# Patient Record
Sex: FEMALE | Race: BLACK OR AFRICAN AMERICAN | Employment: OTHER | URBAN - METROPOLITAN AREA
[De-identification: names, ages, dates, MRNs, and addresses within clinical notes are randomized per-mention and may not be internally consistent; named-entity substitution may affect disease eponyms.]

---

## 2017-05-12 ENCOUNTER — HOSPITAL ENCOUNTER (OUTPATIENT)
Age: 47
Setting detail: SPECIMEN
Discharge: HOME | End: 2017-05-12
Payer: MEDICARE

## 2017-05-12 LAB
ANION GAP SERPL CALCULATED.3IONS-SCNC: 14 MMOL/L (ref 9–17)
BUN BLDV-MCNC: 12 MG/DL (ref 6–20)
BUN/CREAT BLD: ABNORMAL (ref 9–20)
CALCIUM SERPL-MCNC: 8.7 MG/DL (ref 8.6–10.4)
CHLORIDE BLD-SCNC: 102 MMOL/L (ref 98–107)
CO2: 26 MMOL/L (ref 20–31)
CREAT SERPL-MCNC: 0.76 MG/DL (ref 0.5–0.9)
GFR AFRICAN AMERICAN: >60 ML/MIN
GFR NON-AFRICAN AMERICAN: >60 ML/MIN
GFR SERPL CREATININE-BSD FRML MDRD: ABNORMAL ML/MIN/{1.73_M2}
GFR SERPL CREATININE-BSD FRML MDRD: ABNORMAL ML/MIN/{1.73_M2}
GLUCOSE BLD-MCNC: 90 MG/DL (ref 70–99)
POTASSIUM SERPL-SCNC: 3.3 MMOL/L (ref 3.7–5.3)
SODIUM BLD-SCNC: 142 MMOL/L (ref 135–144)

## 2017-07-07 ENCOUNTER — OFFICE VISIT (OUTPATIENT)
Dept: PULMONOLOGY | Age: 47
End: 2017-07-07
Payer: MEDICARE

## 2017-07-07 VITALS — HEART RATE: 73 BPM | BODY MASS INDEX: 48.82 KG/M2 | WEIGHT: 293 LBS | HEIGHT: 65 IN | OXYGEN SATURATION: 99 %

## 2017-07-07 DIAGNOSIS — R06.00 DYSPNEA, UNSPECIFIED TYPE: Primary | ICD-10-CM

## 2017-07-07 PROCEDURE — 4004F PT TOBACCO SCREEN RCVD TLK: CPT | Performed by: INTERNAL MEDICINE

## 2017-07-07 PROCEDURE — 94375 RESPIRATORY FLOW VOLUME LOOP: CPT | Performed by: INTERNAL MEDICINE

## 2017-07-11 ENCOUNTER — OFFICE VISIT (OUTPATIENT)
Dept: PULMONOLOGY | Age: 47
End: 2017-07-11
Payer: MEDICARE

## 2017-07-11 VITALS
SYSTOLIC BLOOD PRESSURE: 153 MMHG | WEIGHT: 293 LBS | HEART RATE: 88 BPM | DIASTOLIC BLOOD PRESSURE: 88 MMHG | OXYGEN SATURATION: 96 % | HEIGHT: 65 IN | BODY MASS INDEX: 48.82 KG/M2

## 2017-07-11 DIAGNOSIS — J45.40 MODERATE PERSISTENT ASTHMA WITHOUT COMPLICATION: ICD-10-CM

## 2017-07-11 DIAGNOSIS — G47.33 OSA (OBSTRUCTIVE SLEEP APNEA): Primary | ICD-10-CM

## 2017-07-11 DIAGNOSIS — E66.01 MORBID OBESITY WITH BMI OF 50.0-59.9, ADULT (HCC): ICD-10-CM

## 2017-07-11 PROCEDURE — 99204 OFFICE O/P NEW MOD 45 MIN: CPT | Performed by: INTERNAL MEDICINE

## 2017-07-11 PROCEDURE — G8417 CALC BMI ABV UP PARAM F/U: HCPCS | Performed by: INTERNAL MEDICINE

## 2017-07-11 PROCEDURE — 1036F TOBACCO NON-USER: CPT | Performed by: INTERNAL MEDICINE

## 2017-07-11 PROCEDURE — G8427 DOCREV CUR MEDS BY ELIG CLIN: HCPCS | Performed by: INTERNAL MEDICINE

## 2017-07-11 RX ORDER — FLUTICASONE PROPIONATE 50 MCG
2 SPRAY, SUSPENSION (ML) NASAL DAILY
Qty: 1 BOTTLE | Refills: 11 | Status: SHIPPED | OUTPATIENT
Start: 2017-07-11 | End: 2017-11-03 | Stop reason: ALTCHOICE

## 2017-07-11 RX ORDER — TRAZODONE HYDROCHLORIDE 50 MG/1
50 TABLET ORAL NIGHTLY
COMMUNITY

## 2017-07-11 RX ORDER — OXYBUTYNIN CHLORIDE 10 MG/1
10 TABLET, EXTENDED RELEASE ORAL DAILY
COMMUNITY

## 2017-07-11 RX ORDER — PRAVASTATIN SODIUM 80 MG/1
80 TABLET ORAL NIGHTLY
COMMUNITY

## 2017-07-11 RX ORDER — ARIPIPRAZOLE 15 MG/1
15 TABLET ORAL NIGHTLY
COMMUNITY

## 2017-07-11 ASSESSMENT — SLEEP AND FATIGUE QUESTIONNAIRES
HOW LIKELY ARE YOU TO NOD OFF OR FALL ASLEEP IN A CAR, WHILE STOPPED FOR A FEW MINUTES IN TRAFFIC: 0
HOW LIKELY ARE YOU TO NOD OFF OR FALL ASLEEP WHILE WATCHING TV: 2
HOW LIKELY ARE YOU TO NOD OFF OR FALL ASLEEP WHILE SITTING QUIETLY AFTER LUNCH WITHOUT ALCOHOL: 1
HOW LIKELY ARE YOU TO NOD OFF OR FALL ASLEEP WHEN YOU ARE A PASSENGER IN A CAR FOR AN HOUR WITHOUT A BREAK: 0
ESS TOTAL SCORE: 7
HOW LIKELY ARE YOU TO NOD OFF OR FALL ASLEEP WHILE SITTING AND READING: 2
HOW LIKELY ARE YOU TO NOD OFF OR FALL ASLEEP WHILE SITTING INACTIVE IN A PUBLIC PLACE: 1
HOW LIKELY ARE YOU TO NOD OFF OR FALL ASLEEP WHILE SITTING AND TALKING TO SOMEONE: 0
HOW LIKELY ARE YOU TO NOD OFF OR FALL ASLEEP WHILE LYING DOWN TO REST IN THE AFTERNOON WHEN CIRCUMSTANCES PERMIT: 1

## 2017-07-28 ENCOUNTER — HOSPITAL ENCOUNTER (OUTPATIENT)
Dept: SLEEP CENTER | Age: 47
Discharge: HOME | End: 2017-07-28
Payer: MEDICARE

## 2017-07-28 DIAGNOSIS — G47.33 OSA (OBSTRUCTIVE SLEEP APNEA): ICD-10-CM

## 2017-07-28 PROCEDURE — 95810 POLYSOM 6/> YRS 4/> PARAM: CPT

## 2017-07-28 ASSESSMENT — SLEEP AND FATIGUE QUESTIONNAIRES: NECK CIRCUMFERENCE (INCHES): 44

## 2017-07-29 VITALS — RESPIRATION RATE: 22 BRPM | HEIGHT: 65 IN | HEART RATE: 87 BPM | WEIGHT: 293 LBS | BODY MASS INDEX: 48.82 KG/M2

## 2017-08-07 ENCOUNTER — HOSPITAL ENCOUNTER (OUTPATIENT)
Dept: SLEEP CENTER | Age: 47
Discharge: HOME | End: 2017-08-07
Payer: MEDICARE

## 2017-08-07 DIAGNOSIS — G47.33 OSA (OBSTRUCTIVE SLEEP APNEA): ICD-10-CM

## 2017-08-07 PROCEDURE — 95811 POLYSOM 6/>YRS CPAP 4/> PARM: CPT

## 2017-08-25 ENCOUNTER — HOSPITAL ENCOUNTER (EMERGENCY)
Age: 47
Discharge: HOME | End: 2017-08-25
Attending: EMERGENCY MEDICINE
Payer: MEDICARE

## 2017-08-25 VITALS
HEART RATE: 95 BPM | OXYGEN SATURATION: 97 % | DIASTOLIC BLOOD PRESSURE: 80 MMHG | SYSTOLIC BLOOD PRESSURE: 135 MMHG | RESPIRATION RATE: 16 BRPM | WEIGHT: 293 LBS | BODY MASS INDEX: 48.82 KG/M2 | TEMPERATURE: 97.5 F | HEIGHT: 65 IN

## 2017-08-25 DIAGNOSIS — N93.8 DYSFUNCTIONAL UTERINE BLEEDING: Primary | ICD-10-CM

## 2017-08-25 LAB
-: ABNORMAL
AMORPHOUS: ABNORMAL
BACTERIA: ABNORMAL
BILIRUBIN URINE: NEGATIVE
CASTS UA: ABNORMAL /LPF (ref 0–2)
COLOR: ABNORMAL
CRYSTALS, UA: ABNORMAL /HPF
DIRECT EXAM: NORMAL
EPITHELIAL CELLS UA: ABNORMAL /HPF (ref 0–5)
GLUCOSE URINE: NEGATIVE
HCG(URINE) PREGNANCY TEST: NEGATIVE
HCT VFR BLD CALC: 36.7 % (ref 36–46)
HEMOGLOBIN: 11.8 G/DL (ref 12–16)
KETONES, URINE: NEGATIVE
LEUKOCYTE ESTERASE, URINE: ABNORMAL
Lab: NORMAL
MUCUS: ABNORMAL
NITRITE, URINE: NEGATIVE
OTHER OBSERVATIONS UA: ABNORMAL
PH UA: 5.5 (ref 5–8)
PROTEIN UA: ABNORMAL
RBC UA: ABNORMAL /HPF (ref 0–2)
RENAL EPITHELIAL, UA: ABNORMAL /HPF
SPECIFIC GRAVITY UA: 1.02 (ref 1–1.03)
SPECIMEN DESCRIPTION: NORMAL
STATUS: NORMAL
TRICHOMONAS: ABNORMAL
TURBIDITY: ABNORMAL
URINE HGB: ABNORMAL
UROBILINOGEN, URINE: NORMAL
WBC UA: ABNORMAL /HPF (ref 0–5)
YEAST: ABNORMAL

## 2017-08-25 PROCEDURE — 99284 EMERGENCY DEPT VISIT MOD MDM: CPT

## 2017-08-25 PROCEDURE — 81001 URINALYSIS AUTO W/SCOPE: CPT

## 2017-08-25 PROCEDURE — 87491 CHLMYD TRACH DNA AMP PROBE: CPT

## 2017-08-25 PROCEDURE — 85018 HEMOGLOBIN: CPT

## 2017-08-25 PROCEDURE — 85014 HEMATOCRIT: CPT

## 2017-08-25 PROCEDURE — 87510 GARDNER VAG DNA DIR PROBE: CPT

## 2017-08-25 PROCEDURE — 87660 TRICHOMONAS VAGIN DIR PROBE: CPT

## 2017-08-25 PROCEDURE — 84703 CHORIONIC GONADOTROPIN ASSAY: CPT

## 2017-08-25 PROCEDURE — 87480 CANDIDA DNA DIR PROBE: CPT

## 2017-08-25 PROCEDURE — 87591 N.GONORRHOEAE DNA AMP PROB: CPT

## 2017-08-25 ASSESSMENT — ENCOUNTER SYMPTOMS
RESPIRATORY NEGATIVE: 1
ALLERGIC/IMMUNOLOGIC NEGATIVE: 1
GASTROINTESTINAL NEGATIVE: 1
EYES NEGATIVE: 1

## 2017-08-28 LAB
C TRACH DNA GENITAL QL NAA+PROBE: NEGATIVE
N. GONORRHOEAE DNA: NEGATIVE

## 2017-09-06 ENCOUNTER — OFFICE VISIT (OUTPATIENT)
Dept: OBGYN | Age: 47
End: 2017-09-06
Payer: MEDICARE

## 2017-09-06 VITALS
BODY MASS INDEX: 57.58 KG/M2 | WEIGHT: 293 LBS | HEART RATE: 98 BPM | SYSTOLIC BLOOD PRESSURE: 132 MMHG | DIASTOLIC BLOOD PRESSURE: 89 MMHG

## 2017-09-06 DIAGNOSIS — N92.0 MENORRHAGIA WITH REGULAR CYCLE: ICD-10-CM

## 2017-09-06 DIAGNOSIS — N94.6 DYSMENORRHEA: ICD-10-CM

## 2017-09-06 DIAGNOSIS — Z01.419 WELL WOMAN EXAM: Primary | ICD-10-CM

## 2017-09-06 PROCEDURE — 99212 OFFICE O/P EST SF 10 MIN: CPT

## 2017-09-06 PROCEDURE — G0101 CA SCREEN;PELVIC/BREAST EXAM: HCPCS | Performed by: OBSTETRICS & GYNECOLOGY

## 2017-09-07 DIAGNOSIS — Z12.39 BREAST CANCER SCREENING: Primary | ICD-10-CM

## 2017-09-13 ENCOUNTER — OFFICE VISIT (OUTPATIENT)
Dept: PULMONOLOGY | Age: 47
End: 2017-09-13
Payer: MEDICARE

## 2017-09-13 VITALS
OXYGEN SATURATION: 99 % | TEMPERATURE: 97.1 F | WEIGHT: 293 LBS | DIASTOLIC BLOOD PRESSURE: 77 MMHG | HEART RATE: 71 BPM | HEIGHT: 65 IN | BODY MASS INDEX: 48.82 KG/M2 | SYSTOLIC BLOOD PRESSURE: 145 MMHG | RESPIRATION RATE: 14 BRPM

## 2017-09-13 VITALS — WEIGHT: 293 LBS | OXYGEN SATURATION: 99 % | BODY MASS INDEX: 48.82 KG/M2 | HEART RATE: 53 BPM | HEIGHT: 65 IN

## 2017-09-13 DIAGNOSIS — J30.89 NON-SEASONAL ALLERGIC RHINITIS, UNSPECIFIED ALLERGIC RHINITIS TRIGGER: ICD-10-CM

## 2017-09-13 DIAGNOSIS — J45.30 MILD PERSISTENT ASTHMA WITHOUT COMPLICATION: Primary | ICD-10-CM

## 2017-09-13 DIAGNOSIS — G47.33 OSA ON CPAP: ICD-10-CM

## 2017-09-13 DIAGNOSIS — J45.40 MODERATE PERSISTENT ASTHMA WITHOUT COMPLICATION: ICD-10-CM

## 2017-09-13 DIAGNOSIS — Z99.89 OSA ON CPAP: ICD-10-CM

## 2017-09-13 DIAGNOSIS — R06.00 DYSPNEA, UNSPECIFIED TYPE: ICD-10-CM

## 2017-09-13 DIAGNOSIS — G47.33 OSA (OBSTRUCTIVE SLEEP APNEA): Primary | ICD-10-CM

## 2017-09-13 PROCEDURE — G8417 CALC BMI ABV UP PARAM F/U: HCPCS | Performed by: INTERNAL MEDICINE

## 2017-09-13 PROCEDURE — 94375 RESPIRATORY FLOW VOLUME LOOP: CPT | Performed by: INTERNAL MEDICINE

## 2017-09-13 PROCEDURE — 99214 OFFICE O/P EST MOD 30 MIN: CPT | Performed by: INTERNAL MEDICINE

## 2017-09-13 PROCEDURE — 1036F TOBACCO NON-USER: CPT | Performed by: INTERNAL MEDICINE

## 2017-09-13 PROCEDURE — 94729 DIFFUSING CAPACITY: CPT | Performed by: INTERNAL MEDICINE

## 2017-09-13 PROCEDURE — G8427 DOCREV CUR MEDS BY ELIG CLIN: HCPCS | Performed by: INTERNAL MEDICINE

## 2017-09-13 PROCEDURE — 94726 PLETHYSMOGRAPHY LUNG VOLUMES: CPT | Performed by: INTERNAL MEDICINE

## 2017-09-13 ASSESSMENT — SLEEP AND FATIGUE QUESTIONNAIRES
HOW LIKELY ARE YOU TO NOD OFF OR FALL ASLEEP WHILE SITTING QUIETLY AFTER LUNCH WITHOUT ALCOHOL: 1
HOW LIKELY ARE YOU TO NOD OFF OR FALL ASLEEP WHILE SITTING AND TALKING TO SOMEONE: 0
HOW LIKELY ARE YOU TO NOD OFF OR FALL ASLEEP IN A CAR, WHILE STOPPED FOR A FEW MINUTES IN TRAFFIC: 0
HOW LIKELY ARE YOU TO NOD OFF OR FALL ASLEEP WHILE SITTING AND READING: 2
HOW LIKELY ARE YOU TO NOD OFF OR FALL ASLEEP WHILE WATCHING TV: 3
HOW LIKELY ARE YOU TO NOD OFF OR FALL ASLEEP WHEN YOU ARE A PASSENGER IN A CAR FOR AN HOUR WITHOUT A BREAK: 1
HOW LIKELY ARE YOU TO NOD OFF OR FALL ASLEEP WHILE LYING DOWN TO REST IN THE AFTERNOON WHEN CIRCUMSTANCES PERMIT: 3
HOW LIKELY ARE YOU TO NOD OFF OR FALL ASLEEP WHILE SITTING INACTIVE IN A PUBLIC PLACE: 1
ESS TOTAL SCORE: 11

## 2017-09-27 ENCOUNTER — OFFICE VISIT (OUTPATIENT)
Dept: OBGYN | Age: 47
End: 2017-09-27
Payer: MEDICARE

## 2017-09-27 VITALS
SYSTOLIC BLOOD PRESSURE: 149 MMHG | BODY MASS INDEX: 59.08 KG/M2 | HEART RATE: 79 BPM | WEIGHT: 293 LBS | DIASTOLIC BLOOD PRESSURE: 76 MMHG

## 2017-09-27 DIAGNOSIS — Z09 FOLLOW UP: ICD-10-CM

## 2017-09-27 DIAGNOSIS — N92.0 MENORRHAGIA WITH REGULAR CYCLE: Primary | ICD-10-CM

## 2017-09-27 PROCEDURE — G8417 CALC BMI ABV UP PARAM F/U: HCPCS | Performed by: OBSTETRICS & GYNECOLOGY

## 2017-09-27 PROCEDURE — G8427 DOCREV CUR MEDS BY ELIG CLIN: HCPCS | Performed by: OBSTETRICS & GYNECOLOGY

## 2017-09-27 PROCEDURE — 99212 OFFICE O/P EST SF 10 MIN: CPT

## 2017-09-27 PROCEDURE — 1036F TOBACCO NON-USER: CPT | Performed by: OBSTETRICS & GYNECOLOGY

## 2017-09-27 PROCEDURE — 99213 OFFICE O/P EST LOW 20 MIN: CPT | Performed by: OBSTETRICS & GYNECOLOGY

## 2017-09-28 ENCOUNTER — TELEPHONE (OUTPATIENT)
Dept: OBGYN | Age: 47
End: 2017-09-28

## 2017-10-02 ENCOUNTER — HOSPITAL ENCOUNTER (OUTPATIENT)
Dept: MAMMOGRAPHY | Age: 47
Discharge: HOME | End: 2017-10-02
Payer: MEDICARE

## 2017-10-02 DIAGNOSIS — Z12.39 BREAST CANCER SCREENING: ICD-10-CM

## 2017-10-02 PROCEDURE — 77063 BREAST TOMOSYNTHESIS BI: CPT

## 2017-10-20 ENCOUNTER — PREP FOR PROCEDURE (OUTPATIENT)
Dept: OBGYN | Age: 47
End: 2017-10-20

## 2017-10-20 ENCOUNTER — OFFICE VISIT (OUTPATIENT)
Dept: OBGYN | Age: 47
End: 2017-10-20
Payer: MEDICARE

## 2017-10-20 VITALS
SYSTOLIC BLOOD PRESSURE: 124 MMHG | WEIGHT: 293 LBS | HEIGHT: 65 IN | DIASTOLIC BLOOD PRESSURE: 78 MMHG | BODY MASS INDEX: 48.82 KG/M2 | HEART RATE: 89 BPM | RESPIRATION RATE: 18 BRPM | TEMPERATURE: 97.9 F

## 2017-10-20 DIAGNOSIS — N94.6 DYSMENORRHEA: ICD-10-CM

## 2017-10-20 DIAGNOSIS — Z01.818 PREOP TESTING: Primary | ICD-10-CM

## 2017-10-20 DIAGNOSIS — N92.0 MENORRHAGIA WITH REGULAR CYCLE: Primary | ICD-10-CM

## 2017-10-20 PROCEDURE — G8427 DOCREV CUR MEDS BY ELIG CLIN: HCPCS | Performed by: OBSTETRICS & GYNECOLOGY

## 2017-10-20 PROCEDURE — G8417 CALC BMI ABV UP PARAM F/U: HCPCS | Performed by: OBSTETRICS & GYNECOLOGY

## 2017-10-20 PROCEDURE — 99212 OFFICE O/P EST SF 10 MIN: CPT

## 2017-10-20 PROCEDURE — 1036F TOBACCO NON-USER: CPT | Performed by: OBSTETRICS & GYNECOLOGY

## 2017-10-20 PROCEDURE — G8484 FLU IMMUNIZE NO ADMIN: HCPCS | Performed by: OBSTETRICS & GYNECOLOGY

## 2017-10-20 PROCEDURE — 99213 OFFICE O/P EST LOW 20 MIN: CPT | Performed by: OBSTETRICS & GYNECOLOGY

## 2017-10-20 RX ORDER — SODIUM CHLORIDE 0.9 % (FLUSH) 0.9 %
10 SYRINGE (ML) INJECTION PRN
Status: CANCELLED | OUTPATIENT
Start: 2017-10-20

## 2017-10-20 RX ORDER — SODIUM CHLORIDE 0.9 % (FLUSH) 0.9 %
10 SYRINGE (ML) INJECTION EVERY 12 HOURS SCHEDULED
Status: CANCELLED | OUTPATIENT
Start: 2017-10-20

## 2017-10-20 NOTE — PROGRESS NOTES
Jaida Mckenzie  1970  Primary Care Physician: Devi Louis    HISTORY & PHYSICAL      10/20/2017       HOSPITAL: [facility]     HPI: Jaida Mckenzie is a 49 y.o. female  who complains of new onset dysmenorrhea and menorrhagia. Patient was unable to tolerate a TVUS and therefore had a transabdominal exam was previously performed. Ultrasound results revealed a 3.7 cm hypoechoic lesion in the posterior uterus suggestive of a fibroid and a mildly thickened endometrial stripe. Patient counseled on recommendation for EMB for further evaluation of her menorrhagia. Patient is at increased risk for endometrial cancer given her nulliparity. Patient experiences significant discomfort with vaginal exams and states that she does not believe she will be able to tolerate a EMB. Patient counseled that the only other way to evaluate her AUB for hyperplasia would be with a hysteroscopy D&C. Patient elects to proceed with surgical evaluation. Given patients comorbid conditions, she was instructed that she will need medical clearance for surgery. 1. Menorrhagia with regular cycle    2.  Dysmenorrhea       Patient Active Problem List   Diagnosis    Hypertension    Hyperlipidemia    Chronic back pain    Obesity    PCOS (polycystic ovarian syndrome)    Schizophrenia ([address]    Depression    Genital warts    Dysmenorrhea    Menorrhagia       Obstetric History       T0      L0     SAB0   TAB0   Ectopic0   Molar0   Multiple0   Live Births0         Past Medical History:   Diagnosis Date    Bronchitis     Chronic back pain     Depression 2014    Diabetes mellitus ([address]     Type II    Frequency of urination     Hyperlipidemia     Hypertension     Obesity     Palpitations     occasional    Schizophrenia ([address]     Wears glasses        Past Surgical History:   Procedure Laterality Date    OTHER SURGICAL HISTORY Right 10/17/14    excision dorsal exostosis rt foot Social History     Social History    Marital status: Single     Spouse name: N/A    Number of children: N/A    Years of education: N/A     Occupational History    Not on file. Social History Main Topics    Smoking status: Never Smoker    Smokeless tobacco: Never Used    Alcohol use No    Drug use: No    Sexual activity: No     Other Topics Concern    Not on file     Social History Narrative    No narrative on file         MEDICATIONS:  Current Outpatient Prescriptions   Medication Sig Dispense Refill    ARIPiprazole (ABILIFY) 15 MG tablet Take 15 mg by mouth daily      traZODone (DESYREL) 50 MG tablet Take 50 mg by mouth nightly      beclomethasone (QVAR) 40 MCG/ACT inhaler Inhale 2 puffs into the lungs 2 times daily      pravastatin (PRAVACHOL) 80 MG tablet Take 80 mg by mouth daily      oxybutynin (DITROPAN-XL) 10 MG extended release tablet Take 10 mg by mouth daily      fluticasone (FLONASE) 50 MCG/ACT nasal spray 2 sprays by Nasal route daily 1 Bottle 11    gabapentin (NEURONTIN) 100 MG capsule Take 100 mg by mouth 3 times daily.  potassium chloride (MICRO-K) 10 MEQ CR capsule Take 10 mEq by mouth daily.  valsartan (DIOVAN) 40 MG tablet Take 40 mg by mouth daily. Patient takes at night      metFORMIN (GLUCOPHAGE) 500 MG tablet Take 500 mg by mouth daily (with breakfast).  hydrochlorothiazide (MICROZIDE) 12.5 MG capsule Take 12.5 mg by mouth daily.  amLODIPine (NORVASC) 10 MG tablet Take 10 mg by mouth daily. Patient takes at night      ezetimibe (ZETIA) 10 MG tablet Take 10 mg by mouth daily.  albuterol (PROVENTIL HFA;VENTOLIN HFA) 108 (90 BASE) MCG/ACT inhaler Inhale 2 puffs into the lungs every 6 hours as needed for Wheezing.  simvastatin (ZOCOR) 20 MG tablet Take 80 mg by mouth nightly. No current facility-administered medications for this visit.             ALLERGIES:  Allergies as of 10/20/2017 - Review Complete 10/20/2017   Allergen Reaction Date: 10/3/2017  EXAMINATION: BILATERAL DIGITAL SCREENING MAMMOGRAM 10/2/2017 TECHNIQUE: CC and MLO views of the left and right breasts were obtained. Computer aided detection was utilized in the interpretation of this exam. COMPARISON: 06/30/2014 HISTORY: Screening FINDINGS: The breasts are almost entirely fatty. There is no new dominant mass, suspicious microcalcification, or area of architectural distortion. No mammographic evidence of malignancy. BIRADS: BIRADS - CATEGORY 1 Negative, no evidence of malignancy. Normal interval follow-up is recommended in 12 months. OVERALL ASSESSMENT - NEGATIVE A letter of notification will be sent to the patient regarding the results. The [employer] recommends annual mammograms for women 40 years and older. Labs:  Results for orders placed or performed during the hospital encounter of 08/25/17   C.trachomatis N.gonorrhoeae DNA   Result Value Ref Range    C. trachomatis DNA NEGATIVE NEG    N. gonorrhoeae DNA NEGATIVE NEG   VAGINITIS DNA PROBE   Result Value Ref Range    Specimen Description . VAGINA     Special Requests NOT REPORTED     Direct Exam NEGATIVE for Candida sp. Direct Exam NEGATIVE for Trichomonas vaginalis     Direct Exam NEGATIVE for Gardnerella vaginalis     Direct Exam       Method of testing is a DNA probe intended for detection and identification of    Direct Exam        Candida species, Gardnerella vaginalis, and Trichomonas vaginalis nucleic acid    Direct Exam        in vaginal fluid specimens from patients with symptoms of vaginitis/vaginosis.     Direct Exam       [facility] [id number] [place] [address] [phone]    Status FINAL 08/25/2017    Urinalysis with microscopic   Result Value Ref Range    Color, UA RED (A) YEL    Turbidity UA TURBID (A) CLEAR    Glucose, Ur NEGATIVE NEG    Bilirubin Urine NEGATIVE NEG    Ketones, Urine NEGATIVE NEG    Specific Gravity, UA 1.024 1.005 - 1.030    Urine Hgb LARGE (A) NEG    pH, UA 5.5 5.0 - 8.0    Protein, UA 1+ (A) NEG    Urobilinogen, Urine Normal NORM    Nitrite, Urine NEGATIVE NEG    Leukocyte Esterase, Urine SMALL (A) NEG    -          WBC, UA 10 TO 20 0 - 5 /HPF    RBC, UA TOO NUMEROUS TO COUNT 0 - 2 /HPF    Casts UA NOT REPORTED 0 - 2 /LPF    Crystals UA NOT REPORTED NONE /HPF    Epithelial Cells UA 5 TO 10 0 - 5 /HPF    Renal Epithelial, Urine NOT REPORTED 0 /HPF    Bacteria, UA NOT REPORTED NONE    Mucus, UA NOT REPORTED NONE    Trichomonas, UA NOT REPORTED NONE    Amorphous, UA NOT REPORTED NONE    Other Observations UA NOT REPORTED NREQ    Yeast, UA NOT REPORTED NONE   Pregnancy, Urine   Result Value Ref Range    HCG(Urine) Pregnancy Test NEGATIVE NEG   HEMOGLOBIN AND HEMATOCRIT, BLOOD   Result Value Ref Range    Hemoglobin 11.8 (L) 12.0 - 16.0 g/dL    Hematocrit 36.7 36 - 46 %           ASSESSMENT:    1. Menorrhagia with regular cycle     2. Dysmenorrhea         Patient Active Problem List    Diagnosis Date Noted    Dysmenorrhea 09/06/2017    Menorrhagia 09/06/2017     TVUS ordered on 9/6/17  Patient counseled on recommendation for EMB, patient to consider   Follow up in 3 weeks       Genital warts 06/27/2014     Return for excision      PCOS (polycystic ovarian syndrome) 05/01/2014    Schizophrenia ([address] 05/01/2014     On Abilify  Follows at [address] [place] [address] 05/01/2014     On trazadone  Seen at [facility]  No SI/HI      Hypertension     Hyperlipidemia     Chronic back pain     Obesity           Plan:  Patient scheduled for PAT on 10/30/17  Patient states she completed chest xray for pre-operative clearance   Hysteroscopy D&C scheduled for 11/14/17  Cytotec 800 mcg prescribed to be taken the night prior to surgery       Counseling: The patient was counseled on all options both medical and surgical, conservative as well as definitive. She has elected to proceed with the procedure as stated above. The patient was counseled on the procedure. Risks and complications were reviewed in detail. The patients orders, labs, consents have been completed. The history and physical as well as all supporting surgical documentation will be forwarded to the pre-operative holding area. The patient is aware that this procedure may not alleviate her symptoms. That there may be a necessity for a second surgery and that there may be an incomplete removal of abnormal tissue. The patients that are undergoing a procedure for family planning WERE INSTRUCTED THAT THE PROCEDURE IS PERMANENT AND NON REVERSIBLE. FAILURE RATES OF 18-20/1000 CASES WERE REVIEWED AS WELL AS ALL ALTERNATE REVERSIBLE FORMS OF BIRTH CONTROL MALE AND FEMALE. THEY WERE COUNSELED THAT A FAILURE WOULD MOST LIKELY RESULT IN AN ECTOPIC PREGNANCY, A PREGNANCY OUTSIDE OF THE UTERUS MOST COMMONLY IN THE FALLOPIAN TUBE, NECESSITATING FURTHER SURGERY, A BLOOD TRANSFUSION OR BOTH. POST TUBAL STERILIZATION SYNDROME WAS ALSO DISCUSSED WITH THE PATIENT AT LENGTH.      ________________________________________D.O./M.D. Date:___________  A. Mira Minors DO        Patient was seen with total face to face time of 15 minutes. More than 50% of this visit was on counseling and education regarding her   1. Menorrhagia with regular cycle     2. Dysmenorrhea      and her options. She was also counseled on her preventative health maintenance recommendations and follow-up.

## 2017-10-20 NOTE — PROGRESS NOTES
Attending Physician Statement  I have discussed the care of Chloe Burk, including pertinent history and exam findings,  with the resident. I have reviewed the key elements of all parts of the encounter with the resident. I agree with the assessment, plan and orders as documented by the resident.   (GE Modifier)

## 2017-10-30 ENCOUNTER — HOSPITAL ENCOUNTER (OUTPATIENT)
Dept: PREADMISSION TESTING | Age: 47
Discharge: HOME | End: 2017-10-30
Payer: MEDICARE

## 2017-10-30 VITALS
RESPIRATION RATE: 16 BRPM | OXYGEN SATURATION: 96 % | BODY MASS INDEX: 48.82 KG/M2 | TEMPERATURE: 97.9 F | DIASTOLIC BLOOD PRESSURE: 68 MMHG | HEIGHT: 65 IN | HEART RATE: 90 BPM | WEIGHT: 293 LBS | SYSTOLIC BLOOD PRESSURE: 128 MMHG

## 2017-10-30 LAB
ABO/RH: NORMAL
ABSOLUTE EOS #: 0.32 K/UL (ref 0.01–0.44)
ABSOLUTE IMMATURE GRANULOCYTE: <0.03 K/UL (ref 0–0.3)
ABSOLUTE LYMPH #: 2.26 K/UL (ref 1.1–3.7)
ABSOLUTE MONO #: 0.44 K/UL (ref 0.1–1.2)
ALP BLD-CCNC: 79 U/L (ref 35–104)
ALT SERPL-CCNC: 21 U/L (ref 5–33)
ANION GAP SERPL CALCULATED.3IONS-SCNC: 17 MMOL/L (ref 9–17)
ANTIBODY SCREEN: NEGATIVE
ARM BAND NUMBER: NORMAL
BASOPHILS # BLD: 1 % (ref 0–2)
BASOPHILS ABSOLUTE: 0.08 K/UL (ref 0.03–0.2)
BILIRUBIN URINE: NEGATIVE
BUN BLDV-MCNC: 13 MG/DL (ref 6–20)
CHLORIDE BLD-SCNC: 105 MMOL/L (ref 98–107)
CO2: 24 MMOL/L (ref 20–31)
COLOR: YELLOW
COMMENT UA: NORMAL
CREAT SERPL-MCNC: 0.78 MG/DL (ref 0.5–0.9)
DIFFERENTIAL TYPE: ABNORMAL
EKG ATRIAL RATE: 73 BPM
EKG P AXIS: 36 DEGREES
EKG P-R INTERVAL: 178 MS
EKG Q-T INTERVAL: 402 MS
EKG QRS DURATION: 94 MS
EKG QTC CALCULATION (BAZETT): 442 MS
EKG R AXIS: 18 DEGREES
EKG T AXIS: 32 DEGREES
EKG VENTRICULAR RATE: 73 BPM
EOSINOPHILS RELATIVE PERCENT: 5 % (ref 1–4)
EXPIRATION DATE: NORMAL
GFR AFRICAN AMERICAN: >60 ML/MIN
GFR NON-AFRICAN AMERICAN: >60 ML/MIN
GFR SERPL CREATININE-BSD FRML MDRD: NORMAL ML/MIN/{1.73_M2}
GFR SERPL CREATININE-BSD FRML MDRD: NORMAL ML/MIN/{1.73_M2}
GLUCOSE BLD-MCNC: 82 MG/DL (ref 70–99)
GLUCOSE URINE: NEGATIVE
HCG QUANTITATIVE: <1 IU/L
HCT VFR BLD CALC: 39.1 % (ref 36.3–47.1)
HEMOGLOBIN: 11.7 G/DL (ref 11.9–15.1)
IMMATURE GRANULOCYTES: 0 %
KETONES, URINE: NEGATIVE
LEUKOCYTE ESTERASE, URINE: NEGATIVE
LYMPHOCYTES # BLD: 35 % (ref 24–43)
MCH RBC QN AUTO: 26.7 PG (ref 25.2–33.5)
MCHC RBC AUTO-ENTMCNC: 29.9 G/DL (ref 29.9–34.7)
MCV RBC AUTO: 89.1 FL (ref 82.6–102.9)
MONOCYTES # BLD: 7 % (ref 3–12)
NITRITE, URINE: NEGATIVE
PDW BLD-RTO: 14.7 % (ref 11.8–14.4)
PH UA: 7.5 (ref 5–8)
PLATELET # BLD: 235 K/UL (ref 138–453)
PLATELET ESTIMATE: ABNORMAL
PMV BLD AUTO: 11 FL (ref 8.1–13.5)
POTASSIUM SERPL-SCNC: 3.5 MMOL/L (ref 3.7–5.3)
PROTEIN UA: NEGATIVE
RBC # BLD: 4.39 M/UL (ref 3.95–5.11)
RBC # BLD: ABNORMAL 10*6/UL
SEG NEUTROPHILS: 52 % (ref 36–65)
SEGMENTED NEUTROPHILS ABSOLUTE COUNT: 3.31 K/UL (ref 1.5–8.1)
SODIUM BLD-SCNC: 146 MMOL/L (ref 135–144)
SPECIFIC GRAVITY UA: 1.02 (ref 1–1.03)
TURBIDITY: CLEAR
URINE HGB: NEGATIVE
UROBILINOGEN, URINE: NORMAL
WBC # BLD: 6.4 K/UL (ref 3.5–11.3)
WBC # BLD: ABNORMAL 10*3/UL

## 2017-10-30 PROCEDURE — 84702 CHORIONIC GONADOTROPIN TEST: CPT

## 2017-10-30 PROCEDURE — 84460 ALANINE AMINO (ALT) (SGPT): CPT

## 2017-10-30 PROCEDURE — 81003 URINALYSIS AUTO W/O SCOPE: CPT

## 2017-10-30 PROCEDURE — 85025 COMPLETE CBC W/AUTO DIFF WBC: CPT

## 2017-10-30 PROCEDURE — 86900 BLOOD TYPING SEROLOGIC ABO: CPT

## 2017-10-30 PROCEDURE — 36415 COLL VENOUS BLD VENIPUNCTURE: CPT

## 2017-10-30 PROCEDURE — 84075 ASSAY ALKALINE PHOSPHATASE: CPT

## 2017-10-30 PROCEDURE — 86901 BLOOD TYPING SEROLOGIC RH(D): CPT

## 2017-10-30 PROCEDURE — 86850 RBC ANTIBODY SCREEN: CPT

## 2017-10-30 PROCEDURE — 82565 ASSAY OF CREATININE: CPT

## 2017-10-30 PROCEDURE — 82947 ASSAY GLUCOSE BLOOD QUANT: CPT

## 2017-10-30 PROCEDURE — 93005 ELECTROCARDIOGRAM TRACING: CPT

## 2017-10-30 PROCEDURE — 84520 ASSAY OF UREA NITROGEN: CPT

## 2017-10-30 PROCEDURE — 87086 URINE CULTURE/COLONY COUNT: CPT

## 2017-10-30 PROCEDURE — 80051 ELECTROLYTE PANEL: CPT

## 2017-10-30 RX ORDER — SODIUM CHLORIDE, SODIUM LACTATE, POTASSIUM CHLORIDE, CALCIUM CHLORIDE 600; 310; 30; 20 MG/100ML; MG/100ML; MG/100ML; MG/100ML
1000 INJECTION, SOLUTION INTRAVENOUS CONTINUOUS
Status: CANCELLED | OUTPATIENT
Start: 2017-10-30

## 2017-10-31 DIAGNOSIS — E87.6 HYPOKALEMIA: Primary | ICD-10-CM

## 2017-10-31 DIAGNOSIS — Z01.818 PREOPERATIVE TESTING: ICD-10-CM

## 2017-10-31 LAB
CULTURE: NORMAL
CULTURE: NORMAL
Lab: NORMAL
SPECIMEN DESCRIPTION: NORMAL
STATUS: NORMAL

## 2017-11-03 ENCOUNTER — OFFICE VISIT (OUTPATIENT)
Dept: PULMONOLOGY | Age: 47
End: 2017-11-03
Payer: MEDICARE

## 2017-11-03 VITALS
HEART RATE: 79 BPM | WEIGHT: 293 LBS | DIASTOLIC BLOOD PRESSURE: 78 MMHG | RESPIRATION RATE: 18 BRPM | OXYGEN SATURATION: 97 % | SYSTOLIC BLOOD PRESSURE: 141 MMHG | HEIGHT: 65 IN | BODY MASS INDEX: 48.82 KG/M2

## 2017-11-03 DIAGNOSIS — Z99.89 OSA ON CPAP: Primary | ICD-10-CM

## 2017-11-03 DIAGNOSIS — G47.33 OSA ON CPAP: Primary | ICD-10-CM

## 2017-11-03 DIAGNOSIS — J45.909 PERSISTENT ASTHMA WITHOUT COMPLICATION, UNSPECIFIED ASTHMA SEVERITY: ICD-10-CM

## 2017-11-03 PROCEDURE — 99213 OFFICE O/P EST LOW 20 MIN: CPT | Performed by: NURSE PRACTITIONER

## 2017-11-03 PROCEDURE — G8484 FLU IMMUNIZE NO ADMIN: HCPCS | Performed by: NURSE PRACTITIONER

## 2017-11-03 PROCEDURE — 1036F TOBACCO NON-USER: CPT | Performed by: NURSE PRACTITIONER

## 2017-11-03 PROCEDURE — G8427 DOCREV CUR MEDS BY ELIG CLIN: HCPCS | Performed by: NURSE PRACTITIONER

## 2017-11-03 PROCEDURE — G8417 CALC BMI ABV UP PARAM F/U: HCPCS | Performed by: NURSE PRACTITIONER
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
## 2017-11-13 NOTE — H&P
OB/GYN Pre-Op H&P  [address] [facility]    Patient Name: Nikki Noe     Patient : 1970  Room/Bed: [facility] OR [facility]/NONE  Admission Date/Time: 2017  5:24 AM  Primary Care Physician: Mayo Eastman  MRN: [id number]    Date: 2017  Time: 8:14 AM    The patient was seen in pre-op holding. She is here for Hysteroscopy, Dilation and Curettage with Myosure. Patient initially presented with new onset abnormal uterine bleeding. Patient was unable to tolerate a TVUS and therefore had a transabdominal exam was previously performed. Ultrasound results revealed a 3.7 cm hypoechoic lesion in the posterior uterus suggestive of a fibroid and a mildly thickened endometrial stripe. Patient counseled on recommendation for EMB for further evaluation of her menorrhagia. Patient is at increased risk for endometrial cancer given her nulliparity. Patient experiences significant discomfort with vaginal exams and states that she does not believe she will be able to tolerate a EMB. Patient counseled that the only other way to evaluate her AUB for hyperplasia would be with a hysteroscopy D&C. Patient elects to proceed with surgical evaluation. The procedure risks and complications were reviewed. The labs, Consent, and H&P were reviewed and updated. The patient was counseled on the possibility of  the need of a second surgery. The patient voiced understanding and had all of her questions answered. The possibility of incomplete removal of abnormal tissue was discussed. OBSTETRICAL HISTORY:   Obstetric History       T0      L0     SAB0   TAB0   Ectopic0   Molar0   Multiple0   Live Births0           PAST MEDICAL HISTORY:   has a past medical history of Asthma; Bronchitis; Chronic back pain; Depression; Diabetes mellitus ([address] Frequency of urination; Hyperlipidemia; Hypertension; Obesity; Palpitations; Schizophrenia ([address] Sleep apnea; and Wears glasses.     PAST SURGICAL HISTORY:   has a past 10/30/2017 146* 135 - 144 mmol/L Final    Potassium 10/30/2017 3.5* 3.7 - 5.3 mmol/L Final    Chloride 10/30/2017 105  98 - 107 mmol/L Final    CO2 10/30/2017 24  20 - 31 mmol/L Final    Anion Gap 10/30/2017 17  9 - 17 mmol/L Final    Glucose 10/30/2017 82  70 - 99 mg/dL Final    BUN 10/30/2017 13  6 - 20 mg/dL Final    CREATININE 10/30/2017 0.78  0.50 - 0.90 mg/dL Final    GFR Non- 10/30/2017 >60  >60 mL/min Final    GFR  10/30/2017 >60  >60 mL/min Final    GFR Comment 10/30/2017        Final    Comment: Average GFR for 38-51 years old:   80 mL/min/1.73sq m  Chronic Kidney Disease:   <60 mL/min/1.73sq m  Kidney failure:   <15 mL/min/1.73sq m              eGFR calculated using average adult body mass. Additional eGFR calculator   available at:        [email or web address]        [address] [phone]      GFR Staging 10/30/2017 NOT REPORTED   Final    hCG Quant 10/30/2017 <1  <5 IU/L Final    Comment:    Non-preg premeno   <=5  Postmeno           <=8  Male               <=3  If HCG results do not concur with clinical observations, additional testing to   confirm result is recommended. This test is not labeled for use as a tumor   marker.   [address] [address] [phone]      Alkaline Phosphatase 10/30/2017 79  35 - 104 U/L Final    ALT 10/30/2017 21  5 - 33 U/L Final    Color, UA 10/30/2017 YELLOW  YEL Final    Turbidity UA 10/30/2017 CLEAR  CLEAR Final    Glucose, Ur 10/30/2017 NEGATIVE  NEG Final    Bilirubin Urine 10/30/2017 NEGATIVE  NEG Final    Ketones, Urine 10/30/2017 NEGATIVE  NEG Final    Specific Gravity, UA 10/30/2017 1.016  1.005 - 1.030 Final    Urine Hgb 10/30/2017 NEGATIVE  NEG Final    pH, UA 10/30/2017 7.5  5.0 - 8.0 Final    Protein, UA 10/30/2017 NEGATIVE  NEG Final    Urobilinogen, Urine 10/30/2017 Normal  NORM Final    Nitrite, Urine

## 2017-11-14 ENCOUNTER — ANESTHESIA (OUTPATIENT)
Dept: OPERATING ROOM | Age: 47
End: 2017-11-14
Payer: MEDICARE

## 2017-11-14 ENCOUNTER — HOSPITAL ENCOUNTER (OUTPATIENT)
Age: 47
Setting detail: OUTPATIENT SURGERY
Discharge: HOME | End: 2017-11-14
Attending: OBSTETRICS & GYNECOLOGY | Admitting: OBSTETRICS & GYNECOLOGY
Payer: MEDICARE

## 2017-11-14 ENCOUNTER — ANESTHESIA EVENT (OUTPATIENT)
Dept: OPERATING ROOM | Age: 47
End: 2017-11-14
Payer: MEDICARE

## 2017-11-14 VITALS
HEIGHT: 65 IN | BODY MASS INDEX: 48.82 KG/M2 | OXYGEN SATURATION: 97 % | RESPIRATION RATE: 16 BRPM | WEIGHT: 293 LBS | TEMPERATURE: 97.5 F | DIASTOLIC BLOOD PRESSURE: 98 MMHG | SYSTOLIC BLOOD PRESSURE: 137 MMHG | HEART RATE: 75 BPM

## 2017-11-14 VITALS
SYSTOLIC BLOOD PRESSURE: 93 MMHG | RESPIRATION RATE: 9 BRPM | TEMPERATURE: 96.4 F | DIASTOLIC BLOOD PRESSURE: 70 MMHG | OXYGEN SATURATION: 97 %

## 2017-11-14 LAB
GLUCOSE BLD-MCNC: 128 MG/DL (ref 74–100)
HCG, PREGNANCY URINE (POC): NEGATIVE
POC POTASSIUM: 4.4 MMOL/L (ref 3.5–4.5)

## 2017-11-14 PROCEDURE — 3700000000 HC ANESTHESIA ATTENDED CARE: Performed by: OBSTETRICS & GYNECOLOGY

## 2017-11-14 PROCEDURE — 88305 TISSUE EXAM BY PATHOLOGIST: CPT

## 2017-11-14 PROCEDURE — 3600000014 HC SURGERY LEVEL 4 ADDTL 15MIN: Performed by: OBSTETRICS & GYNECOLOGY

## 2017-11-14 PROCEDURE — 58558 HYSTEROSCOPY BIOPSY: CPT | Performed by: OBSTETRICS & GYNECOLOGY

## 2017-11-14 PROCEDURE — 7100000001 HC PACU RECOVERY - ADDTL 15 MIN: Performed by: OBSTETRICS & GYNECOLOGY

## 2017-11-14 PROCEDURE — 6360000002 HC RX W HCPCS: Performed by: ANESTHESIOLOGY

## 2017-11-14 PROCEDURE — 84703 CHORIONIC GONADOTROPIN ASSAY: CPT

## 2017-11-14 PROCEDURE — 2580000003 HC RX 258: Performed by: ANESTHESIOLOGY

## 2017-11-14 PROCEDURE — 7100000000 HC PACU RECOVERY - FIRST 15 MIN: Performed by: OBSTETRICS & GYNECOLOGY

## 2017-11-14 PROCEDURE — 3700000001 HC ADD 15 MINUTES (ANESTHESIA): Performed by: OBSTETRICS & GYNECOLOGY

## 2017-11-14 PROCEDURE — 2720000010 HC SURG SUPPLY STERILE: Performed by: OBSTETRICS & GYNECOLOGY

## 2017-11-14 PROCEDURE — 7100000041 HC SPAR PHASE II RECOVERY - ADDTL 15 MIN: Performed by: OBSTETRICS & GYNECOLOGY

## 2017-11-14 PROCEDURE — 3600000004 HC SURGERY LEVEL 4 BASE: Performed by: OBSTETRICS & GYNECOLOGY

## 2017-11-14 PROCEDURE — 6370000000 HC RX 637 (ALT 250 FOR IP): Performed by: NURSE ANESTHETIST, CERTIFIED REGISTERED

## 2017-11-14 PROCEDURE — 84132 ASSAY OF SERUM POTASSIUM: CPT

## 2017-11-14 PROCEDURE — 7100000040 HC SPAR PHASE II RECOVERY - FIRST 15 MIN: Performed by: OBSTETRICS & GYNECOLOGY

## 2017-11-14 PROCEDURE — 82947 ASSAY GLUCOSE BLOOD QUANT: CPT

## 2017-11-14 PROCEDURE — 2500000003 HC RX 250 WO HCPCS: Performed by: NURSE ANESTHETIST, CERTIFIED REGISTERED

## 2017-11-14 PROCEDURE — 6360000002 HC RX W HCPCS: Performed by: NURSE ANESTHETIST, CERTIFIED REGISTERED

## 2017-11-14 RX ORDER — HYDROCODONE BITARTRATE AND ACETAMINOPHEN 5; 325 MG/1; MG/1
1 TABLET ORAL ONCE
Status: DISCONTINUED | OUTPATIENT
Start: 2017-11-14 | End: 2017-11-14 | Stop reason: HOSPADM

## 2017-11-14 RX ORDER — GLYCOPYRROLATE 0.2 MG/ML
INJECTION INTRAMUSCULAR; INTRAVENOUS PRN
Status: DISCONTINUED | OUTPATIENT
Start: 2017-11-14 | End: 2017-11-14 | Stop reason: SDUPTHER

## 2017-11-14 RX ORDER — ONDANSETRON 2 MG/ML
4 INJECTION INTRAMUSCULAR; INTRAVENOUS
Status: DISCONTINUED | OUTPATIENT
Start: 2017-11-14 | End: 2017-11-14 | Stop reason: HOSPADM

## 2017-11-14 RX ORDER — ONDANSETRON 2 MG/ML
INJECTION INTRAMUSCULAR; INTRAVENOUS PRN
Status: DISCONTINUED | OUTPATIENT
Start: 2017-11-14 | End: 2017-11-14 | Stop reason: SDUPTHER

## 2017-11-14 RX ORDER — ROCURONIUM BROMIDE 10 MG/ML
INJECTION, SOLUTION INTRAVENOUS PRN
Status: DISCONTINUED | OUTPATIENT
Start: 2017-11-14 | End: 2017-11-14 | Stop reason: SDUPTHER

## 2017-11-14 RX ORDER — FENTANYL CITRATE 50 UG/ML
25 INJECTION, SOLUTION INTRAMUSCULAR; INTRAVENOUS EVERY 5 MIN PRN
Status: DISCONTINUED | OUTPATIENT
Start: 2017-11-14 | End: 2017-11-14 | Stop reason: HOSPADM

## 2017-11-14 RX ORDER — SODIUM CHLORIDE, SODIUM LACTATE, POTASSIUM CHLORIDE, CALCIUM CHLORIDE 600; 310; 30; 20 MG/100ML; MG/100ML; MG/100ML; MG/100ML
1000 INJECTION, SOLUTION INTRAVENOUS CONTINUOUS
Status: DISCONTINUED | OUTPATIENT
Start: 2017-11-14 | End: 2017-11-14 | Stop reason: HOSPADM

## 2017-11-14 RX ORDER — SODIUM CHLORIDE 0.9 % (FLUSH) 0.9 %
10 SYRINGE (ML) INJECTION EVERY 12 HOURS SCHEDULED
Status: DISCONTINUED | OUTPATIENT
Start: 2017-11-14 | End: 2017-11-14 | Stop reason: HOSPADM

## 2017-11-14 RX ORDER — KETOROLAC TROMETHAMINE 30 MG/ML
30 INJECTION, SOLUTION INTRAMUSCULAR; INTRAVENOUS ONCE
Status: COMPLETED | OUTPATIENT
Start: 2017-11-14 | End: 2017-11-14

## 2017-11-14 RX ORDER — LIDOCAINE HYDROCHLORIDE 10 MG/ML
INJECTION, SOLUTION EPIDURAL; INFILTRATION; INTRACAUDAL; PERINEURAL PRN
Status: DISCONTINUED | OUTPATIENT
Start: 2017-11-14 | End: 2017-11-14 | Stop reason: SDUPTHER

## 2017-11-14 RX ORDER — ALBUTEROL SULFATE 90 UG/1
AEROSOL, METERED RESPIRATORY (INHALATION) PRN
Status: DISCONTINUED | OUTPATIENT
Start: 2017-11-14 | End: 2017-11-14 | Stop reason: SDUPTHER

## 2017-11-14 RX ORDER — SODIUM CHLORIDE 0.9 % (FLUSH) 0.9 %
10 SYRINGE (ML) INJECTION PRN
Status: DISCONTINUED | OUTPATIENT
Start: 2017-11-14 | End: 2017-11-14 | Stop reason: HOSPADM

## 2017-11-14 RX ORDER — ONDANSETRON 4 MG/1
4 TABLET, ORALLY DISINTEGRATING ORAL EVERY 8 HOURS PRN
Qty: 6 TABLET | Refills: 0 | Status: ON HOLD | OUTPATIENT
Start: 2017-11-14 | End: 2019-03-25

## 2017-11-14 RX ORDER — FENTANYL CITRATE 50 UG/ML
50 INJECTION, SOLUTION INTRAMUSCULAR; INTRAVENOUS EVERY 5 MIN PRN
Status: DISCONTINUED | OUTPATIENT
Start: 2017-11-14 | End: 2017-11-14 | Stop reason: HOSPADM

## 2017-11-14 RX ORDER — DIPHENHYDRAMINE HYDROCHLORIDE 50 MG/ML
12.5 INJECTION INTRAMUSCULAR; INTRAVENOUS
Status: DISCONTINUED | OUTPATIENT
Start: 2017-11-14 | End: 2017-11-14 | Stop reason: HOSPADM

## 2017-11-14 RX ORDER — HYDROCODONE BITARTRATE AND ACETAMINOPHEN 5; 325 MG/1; MG/1
2 TABLET ORAL ONCE
Status: DISCONTINUED | OUTPATIENT
Start: 2017-11-14 | End: 2017-11-14 | Stop reason: HOSPADM

## 2017-11-14 RX ORDER — PROPOFOL 10 MG/ML
INJECTION, EMULSION INTRAVENOUS PRN
Status: DISCONTINUED | OUTPATIENT
Start: 2017-11-14 | End: 2017-11-14 | Stop reason: SDUPTHER

## 2017-11-14 RX ORDER — IBUPROFEN 800 MG/1
800 TABLET ORAL EVERY 6 HOURS PRN
Qty: 30 TABLET | Refills: 0 | Status: SHIPPED | OUTPATIENT
Start: 2017-11-14

## 2017-11-14 RX ORDER — SUCCINYLCHOLINE CHLORIDE 20 MG/ML
INJECTION INTRAMUSCULAR; INTRAVENOUS PRN
Status: DISCONTINUED | OUTPATIENT
Start: 2017-11-14 | End: 2017-11-14 | Stop reason: SDUPTHER

## 2017-11-14 RX ORDER — FENTANYL CITRATE 50 UG/ML
INJECTION, SOLUTION INTRAMUSCULAR; INTRAVENOUS PRN
Status: DISCONTINUED | OUTPATIENT
Start: 2017-11-14 | End: 2017-11-14 | Stop reason: SDUPTHER

## 2017-11-14 RX ADMIN — SUCCINYLCHOLINE CHLORIDE 100 MG: 20 INJECTION, SOLUTION INTRAMUSCULAR; INTRAVENOUS at 08:46

## 2017-11-14 RX ADMIN — ROCURONIUM BROMIDE 3 MG: 10 INJECTION INTRAVENOUS at 08:46

## 2017-11-14 RX ADMIN — GLYCOPYRROLATE 0.4 MG: 0.2 INJECTION, SOLUTION INTRAMUSCULAR; INTRAVENOUS at 09:40

## 2017-11-14 RX ADMIN — FENTANYL CITRATE 50 MCG: 50 INJECTION INTRAMUSCULAR; INTRAVENOUS at 08:42

## 2017-11-14 RX ADMIN — ROCURONIUM BROMIDE 10 MG: 10 INJECTION INTRAVENOUS at 08:49

## 2017-11-14 RX ADMIN — SODIUM CHLORIDE, POTASSIUM CHLORIDE, SODIUM LACTATE AND CALCIUM CHLORIDE 1000 ML: 600; 310; 30; 20 INJECTION, SOLUTION INTRAVENOUS at 07:36

## 2017-11-14 RX ADMIN — PROPOFOL 200 MG: 10 INJECTION, EMULSION INTRAVENOUS at 08:46

## 2017-11-14 RX ADMIN — GLYCOPYRROLATE 0.2 MG: 0.2 INJECTION, SOLUTION INTRAMUSCULAR; INTRAVENOUS at 09:05

## 2017-11-14 RX ADMIN — LIDOCAINE HYDROCHLORIDE 50 MG: 10 INJECTION, SOLUTION EPIDURAL; INFILTRATION; INTRACAUDAL; PERINEURAL at 08:46

## 2017-11-14 RX ADMIN — PROPOFOL 120 MG: 10 INJECTION, EMULSION INTRAVENOUS at 08:48

## 2017-11-14 RX ADMIN — FENTANYL CITRATE 50 MCG: 50 INJECTION INTRAMUSCULAR; INTRAVENOUS at 09:12

## 2017-11-14 RX ADMIN — NEOSTIGMINE METHYLSULFATE 2 MG: 1 INJECTION, SOLUTION INTRAMUSCULAR; INTRAVENOUS; SUBCUTANEOUS at 09:40

## 2017-11-14 RX ADMIN — ALBUTEROL SULFATE 5 PUFF: 90 AEROSOL, METERED RESPIRATORY (INHALATION) at 09:57

## 2017-11-14 RX ADMIN — ONDANSETRON 4 MG: 2 INJECTION, SOLUTION INTRAMUSCULAR; INTRAVENOUS at 09:38

## 2017-11-14 RX ADMIN — KETOROLAC TROMETHAMINE 30 MG: 30 INJECTION, SOLUTION INTRAMUSCULAR at 11:16

## 2017-11-14 RX ADMIN — SODIUM CHLORIDE, POTASSIUM CHLORIDE, SODIUM LACTATE AND CALCIUM CHLORIDE: 600; 310; 30; 20 INJECTION, SOLUTION INTRAVENOUS at 09:35

## 2017-11-14 ASSESSMENT — PAIN - FUNCTIONAL ASSESSMENT: PAIN_FUNCTIONAL_ASSESSMENT: 0-10

## 2017-11-14 ASSESSMENT — PAIN SCALES - GENERAL: PAINLEVEL_OUTOF10: 2

## 2017-11-14 ASSESSMENT — ENCOUNTER SYMPTOMS
SHORTNESS OF BREATH: 0
STRIDOR: 0

## 2017-11-14 NOTE — BRIEF OP NOTE
Brief Operative Note  Department of Obstetrics and Gynecology  [facility]     Patient: Dell Staton   : 1970  MRN: [id number]       Acct: [de-identified]   Date of Procedure: 17     Pre-operative Diagnosis: 49 y.o. female    Abnormal Uterine Bleeding      Post-operative Diagnosis:    Abnormal Uterine Bleeding    Endometrial Polyps     Procedure: Operative Hysteroscopy with Myosure     Surgeon: Dr. Trina Farrell     Assistant(s): Dr. Brook Brand, PGY-3    Anesthesia: General     Findings:  8 week sized uterus, normal appearing vaginal mucosa and cervix, multiple polypoid lesions, bilateral tubal ostia visualized  Total IV fluids/Blood products:  1100 ml crystalloid  Urine Output:  100 ml    Estimated blood loss:  10 ml  Drains:  none  Specimens:  1. Endometrial polyps (Myosure Curreting)   Instrument and Sponge Count: Correct  Complications:  none  Condition:  stable, transferred to post anesthesia recovery    See full operative report for further details.     Leonidas Sánchez DO  Ob/Gyn Resident  Pager: [phone]  2017, 10:10 AM

## 2017-11-14 NOTE — OP NOTE
was drained with a straight catheter, and a right angle was placed into the vagina to visualize the cervix. The cervix was grasped with a single-tooth tenaculum. The uterus and the cervix were sounded and measured 8 cm. The cervix was dilated with suki dilators to a 15 [place] size. The rigid Myosure hysteroscope was inserted into the uterine cavity showing both the endocervical and endometrial cavity. Multiple polyp lesions were identied in the endometrial cavity. Bilateral tubal ostia were visualized. The Myosure device was then inserted into the hysteroscope and used to remove the visualized polyps. Myosure curetings were collected and sent for pathology examination. All instruments were then removed. Minimal bleeding was noted at the tenaculum site and a silver nitrate stick was used to obtain hemostasis. Instrument, sponge, and needle counts were correct at the conclusion of the case. SCDs for DVT prophylaxis remain in place for the post operative period. Dr. Jc Moss was present for the entire operation. Findings:  8 week sized uterus, normal appearing vaginal mucosa and cervix, multiple polypoid lesions, bilateral tubal ostia visualized   Total IV fluids/Blood products:  1100 ml crystalloid  Urine Output:  100 ml    Estimated blood loss:  10 ml  Drains:  none  Specimens:  1.  Endometrial polyps (Myosure Curreting)   Instrument and Sponge Count: Correct  Complications:  none  Condition:  stable, transferred to post anesthesia recovery    Vertell Goodpasture, DO  Ob/Gyn Resident  11/14/2017, 10:23 AM

## 2017-11-14 NOTE — ANESTHESIA PRE PROCEDURE
Department of Anesthesiology  Preprocedure Note       Name:  Sandro Harvey   Age:  49 y.o.  :  1970                                          MRN:  [id number]         Date:  2017      Surgeon: Annalisa Martinez):  Yokasta Delgado MD    Procedure: Procedure(s):  MYOSURE - DILATATION AND CURETTAGE, HYSTEROSCOPY    Medications prior to admission:   Prior to Admission medications    Medication Sig Start Date End Date Taking? Authorizing Provider   ARIPiprazole (ABILIFY) 15 MG tablet Take 15 mg by mouth nightly    Yes Historical Provider, MD   traZODone (DESYREL) 50 MG tablet Take 50 mg by mouth nightly   Yes Historical Provider, MD   gabapentin (NEURONTIN) 100 MG capsule Take 100 mg by mouth 3 times daily. Yes Historical Provider, MD   metFORMIN (GLUCOPHAGE) 500 MG tablet Take 500 mg by mouth daily (with breakfast). Yes Historical Provider, MD   DICLOFENAC POTASSIUM PO Take 50 mg by mouth 3 times daily as needed    Historical Provider, MD   beclomethasone (QVAR) 40 MCG/ACT inhaler Inhale 2 puffs into the lungs 2 times daily    Historical Provider, MD   pravastatin (PRAVACHOL) 80 MG tablet Take 80 mg by mouth nightly     Historical Provider, MD   oxybutynin (DITROPAN-XL) 10 MG extended release tablet Take 10 mg by mouth daily    Historical Provider, MD   potassium chloride (MICRO-K) 10 MEQ CR capsule Take 10 mEq by mouth daily. Historical Provider, MD   valsartan (DIOVAN) 40 MG tablet Take 40 mg by mouth nightly Patient takes at night    Historical Provider, MD   hydrochlorothiazide (MICROZIDE) 12.5 MG capsule Take 12.5 mg by mouth nightly     Historical Provider, MD   amLODIPine (NORVASC) 10 MG tablet Take 10 mg by mouth nightly Patient takes at night    Historical Provider, MD   albuterol (PROVENTIL HFA;VENTOLIN HFA) 108 (90 BASE) MCG/ACT inhaler Inhale 2 puffs into the lungs every 6 hours as needed for Wheezing. Historical Provider, MD   simvastatin (ZOCOR) 20 MG tablet Take 80 mg by mouth nightly. Historical Provider, MD       Current medications:    Current Facility-Administered Medications   Medication Dose Route Frequency Provider Last Rate Last Dose    sodium chloride flush 0.9 % injection 10 mL  10 mL Intravenous 2 times per day Jackson Piles, DO        sodium chloride flush 0.9 % injection 10 mL  10 mL Intravenous PRN Jackson Piles, DO        lactated ringers infusion 1,000 mL  1,000 mL Intravenous Continuous Saniya Gandhi MD 50 mL/hr at 11/14/17 0736 1,000 mL at 11/14/17 0736       Allergies:     Allergies   Allergen Reactions    Lisinopril      Makes her cough       Problem List:    Patient Active Problem List   Diagnosis Code    Hypertension I10    Hyperlipidemia E78.5    Chronic back pain M54.9, G89.29    Obesity E66.9    PCOS (polycystic ovarian syndrome) E28.2    Schizophrenia (HCC) F20.9    Depression F32.9    Genital warts A63.0    Dysmenorrhea N94.6    Menorrhagia N92.0       Past Medical History:        Diagnosis Date    Asthma     Bronchitis 2009    Chronic back pain     Depression 5/1/2014    Diabetes mellitus ([facility] [address]     Type II    Frequency of urination     Hyperlipidemia     Hypertension     Obesity     Palpitations     occasional    Schizophrenia ([address]     Sleep apnea since Oct. 2017    CPAP nightly    Wears glasses        Past Surgical History:        Procedure Laterality Date    OTHER SURGICAL HISTORY Right 10/17/14    excision dorsal exostosis rt foot       Social History:    Social History   Substance Use Topics    Smoking status: Passive Smoke Exposure - Never Smoker    Smokeless tobacco: Never Used      Comment: associates smoking    Alcohol use No                                Counseling given: Not Answered      Vital Signs (Current):   Vitals:    11/14/17 0704 11/14/17 0737   BP:  (!) 173/88   Pulse:  73   Resp:  16   Temp:  98.6 °F (37 °C)   TempSrc:  Temporal   SpO2:  98%   Weight: (!) 367 lb 4.6 oz (166.6 kg)    Height: 5' 5\" (1.651 m) BP Readings from Last 3 Encounters:   11/14/17 (!) 173/88   11/03/17 (!) 141/78   10/30/17 128/68       NPO Status: Time of last liquid consumption: 2200                        Time of last solid consumption: 2200                        Date of last liquid consumption: 11/13/17                        Date of last solid food consumption: 11/13/17    BMI:   Wt Readings from Last 3 Encounters:   11/14/17 (!) 367 lb 4.6 oz (166.6 kg)   11/03/17 (!) 362 lb (164.2 kg)   10/30/17 (!) 363 lb (164.7 kg)     Body mass index is 61.12 kg/m².     CBC:   Lab Results   Component Value Date    WBC 6.4 10/30/2017    RBC 4.39 10/30/2017    HGB 11.7 10/30/2017    HCT 39.1 10/30/2017    MCV 89.1 10/30/2017    RDW 14.7 10/30/2017     10/30/2017       CMP:   Lab Results   Component Value Date     10/30/2017    K 3.5 10/30/2017     10/30/2017    CO2 24 10/30/2017    BUN 13 10/30/2017    CREATININE 0.78 10/30/2017    GFRAA >60 10/30/2017    LABGLOM >60 10/30/2017    GLUCOSE 82 10/30/2017    PROT 6.9 10/07/2016    CALCIUM 8.7 05/12/2017    BILITOT 0.25 10/07/2016    ALKPHOS 79 10/30/2017    AST 14 10/07/2016    ALT 21 10/30/2017       POC Tests:   Recent Labs      11/14/17   0733   POCGLU  128*   POCK  4.4       Coags: No results found for: PROTIME, INR, APTT    HCG (If Applicable):   Lab Results   Component Value Date    PREGTESTUR NEGATIVE 08/25/2017    HCGQUANT <1 10/30/2017        ABGs: No results found for: PHART, PO2ART, UZB4WUC, CAZ7CRF, BEART, V7AQFDDI     Type & Screen (If Applicable):  No results found for: LABABO, LABRH    Anesthesia Evaluation   no history of anesthetic complications:   Airway: Mallampati: III     Neck ROM: full  Mouth opening: > = 3 FB Dental:          Pulmonary:   (+) sleep apnea:  asthma:     (-) shortness of breath and stridor                           Cardiovascular:    (+) hypertension:,     (-) pacemaker, past MI, CAD, CABG/stent,  angina and CHF        Rate: normal                    Neuro/Psych:   (+) psychiatric history (schizophrenia):depression/anxiety    (-) seizures, TIA and CVA           GI/Hepatic/Renal:        (-) GERD and liver disease       Endo/Other:        (-) hypothyroidism, hyperthyroidism, blood dyscrasia, no Type II DM, no Type I DM               Abdominal:   (+) obese,     Abdomen: soft. Vascular:                                        Anesthesia Plan      general     ASA 3       Induction: intravenous. Anesthetic plan and risks discussed with patient (male and female ).                       Talat Ramirez MD   11/14/2017

## 2017-11-15 LAB — SURGICAL PATHOLOGY REPORT: NORMAL

## 2017-11-28 ENCOUNTER — OFFICE VISIT (OUTPATIENT)
Dept: OBGYN | Age: 47
End: 2017-11-28
Payer: MEDICARE

## 2017-11-28 VITALS
BODY MASS INDEX: 48.82 KG/M2 | HEART RATE: 92 BPM | DIASTOLIC BLOOD PRESSURE: 77 MMHG | RESPIRATION RATE: 18 BRPM | HEIGHT: 65 IN | SYSTOLIC BLOOD PRESSURE: 131 MMHG | WEIGHT: 293 LBS | TEMPERATURE: 98.5 F

## 2017-11-28 DIAGNOSIS — Z09 POSTOPERATIVE EXAMINATION: Primary | ICD-10-CM

## 2017-11-28 DIAGNOSIS — N93.9 ABNORMAL UTERINE BLEEDING (AUB): ICD-10-CM

## 2017-11-28 DIAGNOSIS — N84.0 ENDOMETRIAL POLYP: ICD-10-CM

## 2017-11-28 PROCEDURE — 99212 OFFICE O/P EST SF 10 MIN: CPT

## 2017-11-28 PROCEDURE — 99024 POSTOP FOLLOW-UP VISIT: CPT | Performed by: OBSTETRICS & GYNECOLOGY

## 2017-11-28 RX ORDER — MEDROXYPROGESTERONE ACETATE 10 MG/1
TABLET ORAL
Qty: 21 TABLET | Refills: 12 | Status: SHIPPED | OUTPATIENT
Start: 2017-11-28 | End: 2018-02-05 | Stop reason: SDUPTHER

## 2017-11-28 NOTE — PROGRESS NOTES
Nikki Noe  11/28/2017  2:50 PM      Alma Delia Ram[facility]rez  Procedure: operative H-scope      Nikki Noe is a 49 y.o. female Karen Ocampo Patient's last menstrual period was 11/27/2017. The patient was seen, she denies any complaints. She denied any shortness of breath, chest pain or dizziness. She denied any nausea, vomiting, or diarrhea. There is no fever, chills, or rigors. The patient denies any vaginal bleeding, discharge or odor. Blood pressure 131/77, pulse 92, temperature 98.5 °F (36.9 °C), temperature source Oral, resp. rate 18, height 5' 5\" (1.651 m), weight (!) 360 lb 1.6 oz (163.3 kg), last menstrual period 11/27/2017. Assessment:     POD# 15   Stable   Pathology reviewed and found to be benign. Yes, secretory endometrium and benign polyps. Plan:  1. Postoperative examination      2. Abnormal uterine bleeding (AUB)  - treatment options reviewed including no treatment, oral progestin, depo provera, progesterone IUD  - pt is opting for oral provera   - medroxyPROGESTERone (PROVERA) 10 MG tablet; 1 tablet daily for the first 21 days of each month  Dispense: 21 tablet; Refill: 12    3. Endometrial polyp treated with operative H-scope          Return in about 3 months (around 2/28/2018) for follow up.    No restrictions

## 2017-11-28 NOTE — PATIENT INSTRUCTIONS
Patient Education          medroxyprogesterone (oral)  Pronunciation:  me DROX ee proe YONI ter one  Brand:  Provera  What is the most important information I should know about medroxyprogesterone? You should not use this medicine if you are pregnant, or if you have liver disease, a hormone-related cancer such as breast or uterine cancer, a history of stroke or blood clot, or abnormal vaginal bleeding that has not been checked by a doctor. Medroxyprogesterone should not be used to prevent heart disease, stroke, or dementia. This medicine may actually increase your risk of developing these conditions. Long-term use of medroxyprogesterone may increase your risk of breast cancer, heart attack, stroke, or blood clot. Talk with your doctor about your individual risk. What is medroxyprogesterone? Medroxyprogesterone is a progestin (a form of progesterone), a female hormone that helps regulate ovulation (the release of an egg from an ovary) and menstrual periods. Medroxyprogesterone is used to treat conditions such as absent or irregular menstrual periods, or abnormal uterine bleeding. Medroxyprogesterone is also used to decrease the risk of endometrial hyperplasia (a condition that may lead to uterine cancer) while taking estrogens. Medroxyprogesterone is also used to prevent overgrowth in the lining of the uterus in postmenopausal women who are receiving estrogen hormone replacement therapy. Medroxyprogesterone may also be used for purposes not listed in this medication guide. What should I discuss with my healthcare provider before taking medroxyprogesterone? Medroxyprogesterone can cause birth defects. Do not use if this medicine you are pregnant. Tell your doctor right away if you become pregnant during treatment.   You should not use this medicine if you are allergic to medroxyprogesterone, or if you have:  · abnormal vaginal bleeding that has not been diagnosed;  · a hormone-related cancer such as breast or uterine cancer;  · liver disease; or  · a history of stroke or blood clot. To make sure medroxyprogesterone is safe for you, tell your doctor if you have:  · heart disease, congestive heart failure, recent stroke or heart attack  · high blood pressure;  · high cholesterol or triglycerides;  · low levels of calcium in your blood;  · severe pelvic pain;  · recent miscarriage or ;  · epilepsy;  · asthma;  · migraine headaches;  · a thyroid disorder;  · kidney disease,  · diabetes; or  · lupus. Medroxyprogesterone may increase your risk of developing a condition that can lead to uterine cancer. To help lower this risk, your doctor may prescribe a progestin for you to take with medroxyprogesterone. Report any unusual vaginal bleeding right away. Medroxyprogesterone can pass into breast milk and may harm a nursing baby. Tell your doctor if you are breast-feeding a baby. Medroxyprogesterone will not prevent heart disease, breast cancer, or dementia, and may actually increase the risk of developing these conditions in post-menopausal women. Medroxyprogesterone may also increase the risk of uterine or ovarian cancer in some women. Long-term treatment with estrogens and progestins (such as medroxyprogesterone) may also increase your risk of heart attack, blood clot, or stroke. Talk to your doctor about your specific risks and benefits of taking this medicine, especially if you smoke or are overweight. Your doctor should check your progress on a regular basis (every 3 to 6 months) to determine whether you should continue this treatment. How should I take medroxyprogesterone? Follow all directions on your prescription label. Do not take this medicine in larger or smaller amounts or for longer than recommended. Medroxyprogesterone is usually given for only a few days in a row each month.  You may need to start taking the medication on a certain day of your menstrual cycle, depending on why you are taking headache, slurred speech, problems with vision or balance;  · signs of a blood clot in the lung --chest pain, sudden cough, wheezing, rapid breathing, coughing up blood; or  · signs of a blood clot in your leg --pain, swelling, warmth, or redness in one or both legs. Common side effects may include:  · spotting or breakthrough bleeding;  · changes in your menstrual periods;  · vaginal itching or discharge;  · breast tenderness or discharge;  · headache, dizziness, feeling nervous or depressed;  · bruising or swelling of your veins;  · premenstrual type symptoms (bloating, fluid retention, mood changes);  · sleep problems (insomnia);  · itching, rash, acne, hair growth, loss of scalp hair;  · stomach discomfort, bloating, nausea;  · weight gain; or  · vision changes and difficulty wearing contact lenses. This is not a complete list of side effects and others may occur. Call your doctor for medical advice about side effects. You may report side effects to FDA at 2-001-FDA-2300. What other drugs will affect medroxyprogesterone? Other drugs may interact with medroxyprogesterone, including prescription and over-the-counter medicines, vitamins, and herbal products. Tell each of your health care providers about all medicines you use now and any medicine you start or stop using. Where can I get more information? Your pharmacist can provide more information about medroxyprogesterone. Remember, keep this and all other medicines out of the reach of children, never share your medicines with others, and use this medication only for the indication prescribed. Every effort has been made to ensure that the information provided by [address] is accurate, up-to-date, and complete, but no guarantee is made to that effect. Drug information contained herein may be time sensitive.  [facility] information has been compiled for use by healthcare practitioners and consumers in the [place] and therefore [employer] heart disease, stroke, or dementia. This medicine may actually increase your risk of developing these conditions. Long-term use of medroxyprogesterone may increase your risk of breast cancer, heart attack, stroke, or blood clot. Talk with your doctor about your individual risk. What is medroxyprogesterone? Medroxyprogesterone is a progestin (a form of progesterone), a female hormone that helps regulate ovulation (the release of an egg from an ovary) and menstrual periods. Medroxyprogesterone is used to treat conditions such as absent or irregular menstrual periods, or abnormal uterine bleeding. Medroxyprogesterone is also used to decrease the risk of endometrial hyperplasia (a condition that may lead to uterine cancer) while taking estrogens. Medroxyprogesterone is also used to prevent overgrowth in the lining of the uterus in postmenopausal women who are receiving estrogen hormone replacement therapy. Medroxyprogesterone may also be used for purposes not listed in this medication guide. What should I discuss with my healthcare provider before taking medroxyprogesterone? Medroxyprogesterone can cause birth defects. Do not use if this medicine you are pregnant. Tell your doctor right away if you become pregnant during treatment. You should not use this medicine if you are allergic to medroxyprogesterone, or if you have:  · abnormal vaginal bleeding that has not been diagnosed;  · a hormone-related cancer such as breast or uterine cancer;  · liver disease; or  · a history of stroke or blood clot.   To make sure medroxyprogesterone is safe for you, tell your doctor if you have:  · heart disease, congestive heart failure, recent stroke or heart attack  · high blood pressure;  · high cholesterol or triglycerides;  · low levels of calcium in your blood;  · severe pelvic pain;  · recent miscarriage or ;  · epilepsy;  · asthma;  · migraine headaches;  · a thyroid disorder;  · kidney disease,  · diabetes; time for your next scheduled dose. Do not take extra medicine to make up the missed dose. What happens if I overdose? Seek emergency medical attention or call the Poison Help line at 1-835.754.5976. What should I avoid while taking medroxyprogesterone? Avoid smoking while you are taking this medicine. Smoking greatly increases your risk of blood clots. This medicine can pass into body fluids (urine, feces, vomit). Caregivers should wear rubber gloves while cleaning up a patient's body fluids, handling contaminated trash or laundry or changing diapers. Wash hands before and after removing gloves. Wash soiled clothing and linens separately from other laundry. What are the possible side effects of medroxyprogesterone? Get emergency medical help if you have signs of an allergic reaction:  hives; difficulty breathing; swelling of your face, lips, tongue, or throat. Call your doctor at once if you have:  · vaginal bleeding if you have already gone through menopause;  · a light-headed feeling, like you might pass out;  · a breast lump;  · symptoms of depression (sleep problems, dizziness, mood changes, headache);  · fever;  · jaundice (yellowing of the skin or eyes);  · swelling in your hands, ankles, or feet;  · heart attack symptoms --chest pain or pressure, pain spreading to your jaw or shoulder, nausea, sweating;  · signs of a stroke --sudden numbness or weakness (especially on one side of the body), sudden severe headache, slurred speech, problems with vision or balance;  · signs of a blood clot in the lung --chest pain, sudden cough, wheezing, rapid breathing, coughing up blood; or  · signs of a blood clot in your leg --pain, swelling, warmth, or redness in one or both legs.   Common side effects may include:  · spotting or breakthrough bleeding;  · changes in your menstrual periods;  · vaginal itching or discharge;  · breast tenderness or discharge;  · headache, dizziness, feeling nervous or depressed;  · bruising or swelling of your veins;  · premenstrual type symptoms (bloating, fluid retention, mood changes);  · sleep problems (insomnia);  · itching, rash, acne, hair growth, loss of scalp hair;  · stomach discomfort, bloating, nausea;  · weight gain; or  · vision changes and difficulty wearing contact lenses. This is not a complete list of side effects and others may occur. Call your doctor for medical advice about side effects. You may report side effects to FDA at 7-685-WXN-3082. What other drugs will affect medroxyprogesterone? Other drugs may interact with medroxyprogesterone, including prescription and over-the-counter medicines, vitamins, and herbal products. Tell each of your health care providers about all medicines you use now and any medicine you start or stop using. Where can I get more information? Your pharmacist can provide more information about medroxyprogesterone. Remember, keep this and all other medicines out of the reach of children, never share your medicines with others, and use this medication only for the indication prescribed. Every effort has been made to ensure that the information provided by [address] is accurate, up-to-date, and complete, but no guarantee is made to that effect. Drug information contained herein may be time sensitive. [employer] information has been compiled for use by healthcare practitioners and consumers in the [place] and therefore [employer] does not warrant that uses outside of the [place] are appropriate, unless specifically indicated otherwise. [facility]'s drug information does not endorse drugs, diagnose patients or recommend therapy.  [employer] drug information is an informational resource designed to assist licensed healthcare practitioners in caring for their patients and/or to serve consumers viewing this service as a supplement to, and not a substitute for, the expertise, skill, knowledge and judgment of healthcare practitioners. The absence of a warning for a given drug or drug combination in no way should be construed to indicate that the drug or drug combination is safe, effective or appropriate for any given patient. [facility] does not assume any responsibility for any aspect of healthcare administered with the aid of information [facility] provides. The information contained herein is not intended to cover all possible uses, directions, precautions, warnings, drug interactions, allergic reactions, or adverse effects. If you have questions about the drugs you are taking, check with your doctor, nurse or pharmacist.  Copyright 5276-4503 [address]. Version: 7.03. Revision date: 8/5/2015. Care instructions adapted under license by [facility] ([facility]). If you have questions about a medical condition or this instruction, always ask your healthcare professional. [place] [address] any warranty or liability for your use of this information.

## 2018-01-11 ENCOUNTER — OFFICE VISIT (OUTPATIENT)
Dept: BARIATRICS/WEIGHT MGMT | Age: 48
End: 2018-01-11
Payer: MEDICARE

## 2018-01-11 VITALS
SYSTOLIC BLOOD PRESSURE: 130 MMHG | WEIGHT: 293 LBS | BODY MASS INDEX: 50.02 KG/M2 | DIASTOLIC BLOOD PRESSURE: 70 MMHG | HEART RATE: 74 BPM | RESPIRATION RATE: 20 BRPM | HEIGHT: 64 IN

## 2018-01-11 DIAGNOSIS — M25.561 BILATERAL CHRONIC KNEE PAIN: ICD-10-CM

## 2018-01-11 DIAGNOSIS — F32.A DEPRESSION, UNSPECIFIED DEPRESSION TYPE: ICD-10-CM

## 2018-01-11 DIAGNOSIS — M19.90 ARTHRITIS: ICD-10-CM

## 2018-01-11 DIAGNOSIS — E66.01 MORBID OBESITY WITH BMI OF 60.0-69.9, ADULT (HCC): ICD-10-CM

## 2018-01-11 DIAGNOSIS — G89.29 CHRONIC BACK PAIN, UNSPECIFIED BACK LOCATION, UNSPECIFIED BACK PAIN LATERALITY: ICD-10-CM

## 2018-01-11 DIAGNOSIS — E78.5 HYPERLIPIDEMIA, UNSPECIFIED HYPERLIPIDEMIA TYPE: ICD-10-CM

## 2018-01-11 DIAGNOSIS — I10 ESSENTIAL HYPERTENSION: Primary | ICD-10-CM

## 2018-01-11 DIAGNOSIS — G89.29 BILATERAL CHRONIC KNEE PAIN: ICD-10-CM

## 2018-01-11 DIAGNOSIS — F20.9 SCHIZOPHRENIA, UNSPECIFIED TYPE (HCC): ICD-10-CM

## 2018-01-11 DIAGNOSIS — M54.9 CHRONIC BACK PAIN, UNSPECIFIED BACK LOCATION, UNSPECIFIED BACK PAIN LATERALITY: ICD-10-CM

## 2018-01-11 DIAGNOSIS — R73.03 PREDIABETES: ICD-10-CM

## 2018-01-11 DIAGNOSIS — M25.562 BILATERAL CHRONIC KNEE PAIN: ICD-10-CM

## 2018-01-11 PROCEDURE — 99215 OFFICE O/P EST HI 40 MIN: CPT | Performed by: NURSE PRACTITIONER

## 2018-01-11 PROCEDURE — G8417 CALC BMI ABV UP PARAM F/U: HCPCS | Performed by: NURSE PRACTITIONER

## 2018-01-11 PROCEDURE — G8427 DOCREV CUR MEDS BY ELIG CLIN: HCPCS | Performed by: NURSE PRACTITIONER

## 2018-01-11 PROCEDURE — G8484 FLU IMMUNIZE NO ADMIN: HCPCS | Performed by: NURSE PRACTITIONER

## 2018-01-11 PROCEDURE — 1036F TOBACCO NON-USER: CPT | Performed by: NURSE PRACTITIONER

## 2018-01-11 NOTE — PROGRESS NOTES
Clinical Induction for Group Lifestyle Balance Program Progress Note    Subjective     The patient is a 49 y.o. female being seen regarding supervised weight loss. The patient's PCP is David Purdy . Highest adult weight was 364 lbs and lowest adult weight was 200 lbs. Her Body mass index is 61.28 kg/m². Phan Garcia Her weight goal is 200 lbs. The patient reports that her obesity is significantly affecting her life on a daily basis. Weight Loss Program History:   She has tried [employer] and exercise. These attempts have been somewhat successful with weight loss, but have failed to sustain adequate weight loss. Comorbid Conditions:  Significant diseases affecting this patient are: HTN, HLD, Chronic Back Pain, Schizophrenia and Depression (has followed with [address] since 1992), Prediabetes, Arthritis, Chronic Bilateral Knee Pain      Allergies: Allergies   Allergen Reactions    Lisinopril      Makes her cough       Past Medical History:     Past Medical History:   Diagnosis Date    Asthma     Bronchitis 2009    Chronic back pain     Depression 5/1/2014    Diabetes mellitus ([address]     Type II    Frequency of urination     Hyperlipidemia     Hypertension     Obesity     Palpitations     occasional    Schizophrenia ([address]     Sleep apnea since Oct. 2017    CPAP nightly    Wears glasses    .     Past Surgical History:  Past Surgical History:   Procedure Laterality Date    DILATION AND CURETTAGE OF UTERUS N/A 11/14/2017    OPERATIVE HYSTEROSCOPY WITH Shin Lee, EXCISION ENDOMETRIAL POLYPS performed by Saranya Ashby MD at [address] [place] [address] Drive HYSTEROSCOPY  11/14/2017    exc endometrial polyps    OTHER SURGICAL HISTORY Right 10/17/14    excision dorsal exostosis rt foot       Family History:  Family History   Problem Relation Age of Onset    Diabetes Mother     Heart Failure Mother     Diabetes Maternal Grandmother     Heart Failure Maternal Grandmother     No Known Problems Maternal Grandfather    

## 2018-01-15 LAB
ALBUMIN SERPL-MCNC: 3.7 G/DL
ALP BLD-CCNC: 49 U/L
ALT SERPL-CCNC: 14 U/L
ANION GAP SERPL CALCULATED.3IONS-SCNC: ABNORMAL MMOL/L
AST SERPL-CCNC: 13 U/L
AVERAGE GLUCOSE: 137
BASOPHILS ABSOLUTE: ABNORMAL /ΜL
BASOPHILS RELATIVE PERCENT: 1.6 %
BILIRUB SERPL-MCNC: 13 MG/DL (ref 0.1–1.4)
BUN BLDV-MCNC: 14 MG/DL
CALCIUM SERPL-MCNC: 9.1 MG/DL
CHLORIDE BLD-SCNC: 106 MMOL/L
CHOLESTEROL, TOTAL: 174 MG/DL
CHOLESTEROL/HDL RATIO: 4.1
CO2: 28 MMOL/L
CREAT SERPL-MCNC: 0.98 MG/DL
EOSINOPHILS ABSOLUTE: ABNORMAL /ΜL
EOSINOPHILS RELATIVE PERCENT: 2.9 %
GFR CALCULATED: ABNORMAL
GLUCOSE BLD-MCNC: 90 MG/DL
HBA1C MFR BLD: 6.4 %
HCT VFR BLD CALC: 35.9 % (ref 36–46)
HDLC SERPL-MCNC: 42 MG/DL (ref 35–70)
HEMOGLOBIN: 11.7 G/DL (ref 12–16)
LDL CHOLESTEROL CALCULATED: 123 MG/DL (ref 0–160)
LYMPHOCYTES ABSOLUTE: ABNORMAL /ΜL
LYMPHOCYTES RELATIVE PERCENT: 39.1 %
MCH RBC QN AUTO: 27 PG
MCHC RBC AUTO-ENTMCNC: 32.6 G/DL
MCV RBC AUTO: 82.9 FL
MONOCYTES ABSOLUTE: ABNORMAL /ΜL
MONOCYTES RELATIVE PERCENT: 7.1 %
NEUTROPHILS ABSOLUTE: ABNORMAL /ΜL
NEUTROPHILS RELATIVE PERCENT: 48.3 %
PDW BLD-RTO: 16.6 %
PLATELET # BLD: 279 K/ΜL
PMV BLD AUTO: ABNORMAL FL
POTASSIUM SERPL-SCNC: 3.4 MMOL/L
RBC # BLD: 4.33 10^6/ΜL
SODIUM BLD-SCNC: 142 MMOL/L
TOTAL PROTEIN: 7
TRIGL SERPL-MCNC: 44 MG/DL
TSH SERPL DL<=0.05 MIU/L-ACNC: 1.14 UIU/ML
URIC ACID: 4.7
VLDLC SERPL CALC-MCNC: 9 MG/DL
WBC # BLD: 7.4 10^3/ML

## 2018-01-17 DIAGNOSIS — E66.01 MORBID OBESITY WITH BMI OF 60.0-69.9, ADULT (HCC): ICD-10-CM

## 2018-01-17 DIAGNOSIS — M19.90 ARTHRITIS: ICD-10-CM

## 2018-01-17 DIAGNOSIS — M25.562 BILATERAL CHRONIC KNEE PAIN: ICD-10-CM

## 2018-01-17 DIAGNOSIS — I10 ESSENTIAL HYPERTENSION: ICD-10-CM

## 2018-01-17 DIAGNOSIS — M25.561 BILATERAL CHRONIC KNEE PAIN: ICD-10-CM

## 2018-01-17 DIAGNOSIS — M54.9 CHRONIC BACK PAIN, UNSPECIFIED BACK LOCATION, UNSPECIFIED BACK PAIN LATERALITY: ICD-10-CM

## 2018-01-17 DIAGNOSIS — F32.A DEPRESSION, UNSPECIFIED DEPRESSION TYPE: ICD-10-CM

## 2018-01-17 DIAGNOSIS — E78.5 HYPERLIPIDEMIA, UNSPECIFIED HYPERLIPIDEMIA TYPE: ICD-10-CM

## 2018-01-17 DIAGNOSIS — G89.29 CHRONIC BACK PAIN, UNSPECIFIED BACK LOCATION, UNSPECIFIED BACK PAIN LATERALITY: ICD-10-CM

## 2018-01-17 DIAGNOSIS — F20.9 SCHIZOPHRENIA, UNSPECIFIED TYPE (HCC): ICD-10-CM

## 2018-01-17 DIAGNOSIS — G89.29 BILATERAL CHRONIC KNEE PAIN: ICD-10-CM

## 2018-01-17 DIAGNOSIS — R73.03 PREDIABETES: ICD-10-CM

## 2018-02-02 ENCOUNTER — OFFICE VISIT (OUTPATIENT)
Dept: PULMONOLOGY | Age: 48
End: 2018-02-02
Payer: MEDICARE

## 2018-02-02 VITALS
HEIGHT: 65 IN | WEIGHT: 293 LBS | OXYGEN SATURATION: 99 % | SYSTOLIC BLOOD PRESSURE: 135 MMHG | HEART RATE: 91 BPM | RESPIRATION RATE: 16 BRPM | TEMPERATURE: 98.4 F | BODY MASS INDEX: 48.82 KG/M2 | DIASTOLIC BLOOD PRESSURE: 80 MMHG

## 2018-02-02 DIAGNOSIS — G47.33 OSA ON CPAP: ICD-10-CM

## 2018-02-02 DIAGNOSIS — J45.909 PERSISTENT ASTHMA WITHOUT COMPLICATION, UNSPECIFIED ASTHMA SEVERITY: Primary | ICD-10-CM

## 2018-02-02 DIAGNOSIS — Z99.89 OSA ON CPAP: ICD-10-CM

## 2018-02-02 PROCEDURE — G8417 CALC BMI ABV UP PARAM F/U: HCPCS | Performed by: NURSE PRACTITIONER

## 2018-02-02 PROCEDURE — 99213 OFFICE O/P EST LOW 20 MIN: CPT | Performed by: NURSE PRACTITIONER

## 2018-02-02 PROCEDURE — 1036F TOBACCO NON-USER: CPT | Performed by: NURSE PRACTITIONER

## 2018-02-02 PROCEDURE — G8484 FLU IMMUNIZE NO ADMIN: HCPCS | Performed by: NURSE PRACTITIONER

## 2018-02-02 PROCEDURE — G8427 DOCREV CUR MEDS BY ELIG CLIN: HCPCS | Performed by: NURSE PRACTITIONER

## 2018-02-02 RX ORDER — ALBUTEROL SULFATE 90 UG/1
2 AEROSOL, METERED RESPIRATORY (INHALATION) EVERY 6 HOURS PRN
Qty: 1 INHALER | Refills: 3 | Status: SHIPPED | OUTPATIENT
Start: 2018-02-02

## 2018-02-02 ASSESSMENT — SLEEP AND FATIGUE QUESTIONNAIRES
HOW LIKELY ARE YOU TO NOD OFF OR FALL ASLEEP WHILE SITTING QUIETLY AFTER LUNCH WITHOUT ALCOHOL: 0
ESS TOTAL SCORE: 3
HOW LIKELY ARE YOU TO NOD OFF OR FALL ASLEEP WHEN YOU ARE A PASSENGER IN A CAR FOR AN HOUR WITHOUT A BREAK: 0
HOW LIKELY ARE YOU TO NOD OFF OR FALL ASLEEP WHILE SITTING AND TALKING TO SOMEONE: 0
HOW LIKELY ARE YOU TO NOD OFF OR FALL ASLEEP WHILE WATCHING TV: 1
HOW LIKELY ARE YOU TO NOD OFF OR FALL ASLEEP IN A CAR, WHILE STOPPED FOR A FEW MINUTES IN TRAFFIC: 0
HOW LIKELY ARE YOU TO NOD OFF OR FALL ASLEEP WHILE LYING DOWN TO REST IN THE AFTERNOON WHEN CIRCUMSTANCES PERMIT: 2
HOW LIKELY ARE YOU TO NOD OFF OR FALL ASLEEP WHILE SITTING AND READING: 0
HOW LIKELY ARE YOU TO NOD OFF OR FALL ASLEEP WHILE SITTING INACTIVE IN A PUBLIC PLACE: 0

## 2018-02-02 NOTE — PROGRESS NOTES
PULMONARY OP  PROGRESS NOTE      Patient:  Tara Lovett  YOB: 1970    MRN: [id number]     Acct:        Pt seen and Chart reviewed. Mrs. Tara Lovett is here in followup for   1. Persistent asthma without complication, unspecified asthma severity  beclomethasone (QVAR) 40 MCG/ACT inhaler    albuterol sulfate  (90 Base) MCG/ACT inhaler   2. AISHWARYA on CPAP         Patient has been doing well since last visit; she has not been hospitalized or been to er since last visit. She remains non-compliant with medications as prescribed from the pulmonary standpoint - QVar. She also reports not using her CPAP device at all since her last appt in November 2017. She does not have a bed and reports sleeping on the floor which is too uncomfortable to use her CPAP device during per pt report. She reports good control of her asthma. She has not had any exacerbations or acute illness in \"months. \" She rarely uses her rescue inhaler and, as mentioned above, is not compliant with QVar. Education and reinforcement was once again provided. Pt was also educated re: insurance compliance requirements with CPAP use and risks associated with noncompliance and coverage. Subjective:   Review of Systems -   Constitutional ROS: negative  ENT ROS: negative  Allergy and Immunology ROS: negative  Respiratory ROS: negative  Cardiovascular ROS: negative  Gastrointestinal ROS: negative  Musculoskeletal ROS: negative    Allergies:   Allergies   Allergen Reactions    Lisinopril      Makes her cough       Medications:    Current Outpatient Prescriptions:     beclomethasone (QVAR) 40 MCG/ACT inhaler, Inhale 2 puffs into the lungs 2 times daily, Disp: 1 Inhaler, Rfl: 5    albuterol sulfate  (90 Base) MCG/ACT inhaler, Inhale 2 puffs into the lungs every 6 hours as needed for Wheezing, Disp: 1 Inhaler, Rfl: 3    medroxyPROGESTERone (PROVERA) 10 MG tablet, 1 tablet daily for the first 21 days of each month, Disp: 21 tablet,

## 2018-02-05 DIAGNOSIS — N93.9 ABNORMAL UTERINE BLEEDING (AUB): ICD-10-CM

## 2018-02-05 RX ORDER — MEDROXYPROGESTERONE ACETATE 10 MG/1
TABLET ORAL
Qty: 21 TABLET | Refills: 3 | Status: SHIPPED | OUTPATIENT
Start: 2018-02-05 | End: 2018-04-04 | Stop reason: SDUPTHER

## 2018-02-12 ENCOUNTER — TELEPHONE (OUTPATIENT)
Dept: BARIATRICS/WEIGHT MGMT | Age: 48
End: 2018-02-12

## 2018-03-13 ENCOUNTER — OFFICE VISIT (OUTPATIENT)
Dept: BARIATRICS/WEIGHT MGMT | Age: 48
End: 2018-03-13
Payer: MEDICARE

## 2018-03-13 VITALS
SYSTOLIC BLOOD PRESSURE: 128 MMHG | HEIGHT: 65 IN | BODY MASS INDEX: 48.82 KG/M2 | HEART RATE: 80 BPM | DIASTOLIC BLOOD PRESSURE: 78 MMHG | WEIGHT: 293 LBS

## 2018-03-13 DIAGNOSIS — F20.9 SCHIZOPHRENIA, UNSPECIFIED TYPE (HCC): ICD-10-CM

## 2018-03-13 DIAGNOSIS — G89.29 CHRONIC BACK PAIN, UNSPECIFIED BACK LOCATION, UNSPECIFIED BACK PAIN LATERALITY: ICD-10-CM

## 2018-03-13 DIAGNOSIS — G89.29 BILATERAL CHRONIC KNEE PAIN: ICD-10-CM

## 2018-03-13 DIAGNOSIS — M54.9 CHRONIC BACK PAIN, UNSPECIFIED BACK LOCATION, UNSPECIFIED BACK PAIN LATERALITY: ICD-10-CM

## 2018-03-13 DIAGNOSIS — I10 ESSENTIAL HYPERTENSION: Primary | ICD-10-CM

## 2018-03-13 DIAGNOSIS — M25.562 BILATERAL CHRONIC KNEE PAIN: ICD-10-CM

## 2018-03-13 DIAGNOSIS — M19.90 ARTHRITIS: ICD-10-CM

## 2018-03-13 DIAGNOSIS — M25.561 BILATERAL CHRONIC KNEE PAIN: ICD-10-CM

## 2018-03-13 DIAGNOSIS — F32.A DEPRESSION, UNSPECIFIED DEPRESSION TYPE: ICD-10-CM

## 2018-03-13 DIAGNOSIS — E66.01 MORBID OBESITY WITH BMI OF 60.0-69.9, ADULT (HCC): ICD-10-CM

## 2018-03-13 DIAGNOSIS — E78.5 HYPERLIPIDEMIA, UNSPECIFIED HYPERLIPIDEMIA TYPE: ICD-10-CM

## 2018-03-13 DIAGNOSIS — R73.03 PREDIABETES: ICD-10-CM

## 2018-03-13 DIAGNOSIS — E28.2 PCOS (POLYCYSTIC OVARIAN SYNDROME): ICD-10-CM

## 2018-03-13 PROCEDURE — G8484 FLU IMMUNIZE NO ADMIN: HCPCS | Performed by: NURSE PRACTITIONER

## 2018-03-13 PROCEDURE — 1036F TOBACCO NON-USER: CPT | Performed by: NURSE PRACTITIONER

## 2018-03-13 PROCEDURE — 99213 OFFICE O/P EST LOW 20 MIN: CPT | Performed by: NURSE PRACTITIONER

## 2018-03-13 PROCEDURE — G8427 DOCREV CUR MEDS BY ELIG CLIN: HCPCS | Performed by: NURSE PRACTITIONER

## 2018-03-13 PROCEDURE — G8417 CALC BMI ABV UP PARAM F/U: HCPCS | Performed by: NURSE PRACTITIONER

## 2018-03-13 NOTE — PROGRESS NOTES
Group Lifestyle Balance Follow-up Progress Note      Subjective     Patient is here for group medical appointment for Group Lifestyle Balance weight management program follow-up for the chronic conditions of HTN, HLD, Chronic Back Pain, Schizophrenia and Depression (has followed with [facility] since 1992), Prediabetes, Arthritis, Chronic Bilateral Knee Pain  Patient continues on the program and tolerating well. Total weight gain of 5 lbs. No current issues. Allergies: Allergies   Allergen Reactions    Lisinopril      Makes her cough       Past Medical History:     Past Medical History:   Diagnosis Date    Asthma     Bronchitis 2009    Chronic back pain     Depression 5/1/2014    Diabetes mellitus ([address]     Type II    Frequency of urination     Hyperlipidemia     Hypertension     Obesity     Palpitations     occasional    Schizophrenia ([address]     Sleep apnea since Oct. 2017    CPAP nightly    Wears glasses    . Past Surgical History:  Past Surgical History:   Procedure Laterality Date    DILATION AND CURETTAGE OF UTERUS N/A 11/14/2017    OPERATIVE HYSTEROSCOPY WITH Flynn Woodall, EXCISION ENDOMETRIAL POLYPS performed by Lloyd Wilson MD at [address] [place] [address] HYSTEROSCOPY  11/14/2017    exc endometrial polyps    OTHER SURGICAL HISTORY Right 10/17/14    excision dorsal exostosis rt foot       Family History:  Family History   Problem Relation Age of Onset    Diabetes Mother     Heart Failure Mother     Diabetes Maternal Grandmother     Heart Failure Maternal Grandmother     No Known Problems Maternal Grandfather     No Known Problems Paternal Grandmother     No Known Problems Paternal Grandfather     Cancer Paternal Aunt        Social History:  Social History     Social History    Marital status: Single     Spouse name: N/A    Number of children: 0    Years of education: N/A     Occupational History    Not on file.      Social History Main Topics    Smoking status: Passive Smoke Exposure - Never Smoker    Smokeless tobacco: Never Used      Comment: associates smoking    Alcohol use No    Drug use: No    Sexual activity: No     Other Topics Concern    Not on file     Social History Narrative    No narrative on file       Current Medications:  Current Outpatient Prescriptions   Medication Sig Dispense Refill    medroxyPROGESTERone (PROVERA) 10 MG tablet 1 TABLET DAILY FOR THE FIRST 21 DAYS OF EACH MONTH 21 tablet 3    beclomethasone (QVAR) 40 MCG/ACT inhaler Inhale 2 puffs into the lungs 2 times daily 1 Inhaler 5    albuterol sulfate  (90 Base) MCG/ACT inhaler Inhale 2 puffs into the lungs every 6 hours as needed for Wheezing 1 Inhaler 3    ibuprofen (ADVIL;MOTRIN) 800 MG tablet Take 1 tablet by mouth every 6 hours as needed for Pain 30 tablet 0    ondansetron (ZOFRAN-ODT) 4 MG disintegrating tablet Take 1 tablet by mouth every 8 hours as needed for Nausea or Vomiting 6 tablet 0    DICLOFENAC POTASSIUM PO Take 50 mg by mouth 3 times daily as needed      ARIPiprazole (ABILIFY) 15 MG tablet Take 15 mg by mouth nightly       traZODone (DESYREL) 50 MG tablet Take 50 mg by mouth nightly      pravastatin (PRAVACHOL) 80 MG tablet Take 80 mg by mouth nightly       oxybutynin (DITROPAN-XL) 10 MG extended release tablet Take 10 mg by mouth daily      gabapentin (NEURONTIN) 100 MG capsule Take 100 mg by mouth 3 times daily.  potassium chloride (MICRO-K) 10 MEQ CR capsule Take 10 mEq by mouth daily.  valsartan (DIOVAN) 40 MG tablet Take 40 mg by mouth nightly Patient takes at night      metFORMIN (GLUCOPHAGE) 500 MG tablet Take 500 mg by mouth daily (with breakfast).  hydrochlorothiazide (MICROZIDE) 12.5 MG capsule Take 12.5 mg by mouth nightly       amLODIPine (NORVASC) 10 MG tablet Take 10 mg by mouth nightly Patient takes at night      simvastatin (ZOCOR) 20 MG tablet Take 80 mg by mouth nightly. No current facility-administered medications for this visit. 50% of visit spent in group counseling/education. Assessment:      1. Essential hypertension     2. Prediabetes     3. Arthritis     4. Bilateral chronic knee pain     5. Chronic back pain, unspecified back location, unspecified back pain laterality     6. Hyperlipidemia, unspecified hyperlipidemia type     7. PCOS (polycystic ovarian syndrome)     8. Schizophrenia, unspecified type ([facility]     9. Depression, unspecified depression type     10. Morbid obesity with BMI of 60.0-69.9, adult ([facility]         Plan:      Track food and weight. Return to clinic as per group medical appointment schedule. Follow-up:  Return in about 1 week (around 3/20/2018). Orders:  No orders of the defined types were placed in this encounter. Prescriptions:  No orders of the defined types were placed in this encounter.       Electronically signed by:  Mitchell Merchant CNP

## 2018-03-20 ENCOUNTER — OFFICE VISIT (OUTPATIENT)
Dept: BARIATRICS/WEIGHT MGMT | Age: 48
End: 2018-03-20
Payer: MEDICARE

## 2018-03-20 VITALS
DIASTOLIC BLOOD PRESSURE: 72 MMHG | SYSTOLIC BLOOD PRESSURE: 124 MMHG | HEART RATE: 72 BPM | HEIGHT: 61 IN | WEIGHT: 293 LBS | BODY MASS INDEX: 55.32 KG/M2

## 2018-03-20 DIAGNOSIS — E78.5 HYPERLIPIDEMIA, UNSPECIFIED HYPERLIPIDEMIA TYPE: ICD-10-CM

## 2018-03-20 DIAGNOSIS — E28.2 PCOS (POLYCYSTIC OVARIAN SYNDROME): ICD-10-CM

## 2018-03-20 DIAGNOSIS — F32.A DEPRESSION, UNSPECIFIED DEPRESSION TYPE: ICD-10-CM

## 2018-03-20 DIAGNOSIS — R73.03 PREDIABETES: ICD-10-CM

## 2018-03-20 DIAGNOSIS — M19.90 ARTHRITIS: ICD-10-CM

## 2018-03-20 DIAGNOSIS — M54.9 CHRONIC BACK PAIN, UNSPECIFIED BACK LOCATION, UNSPECIFIED BACK PAIN LATERALITY: ICD-10-CM

## 2018-03-20 DIAGNOSIS — E66.01 MORBID OBESITY WITH BMI OF 60.0-69.9, ADULT (HCC): ICD-10-CM

## 2018-03-20 DIAGNOSIS — I10 ESSENTIAL HYPERTENSION: Primary | ICD-10-CM

## 2018-03-20 DIAGNOSIS — M25.562 BILATERAL CHRONIC KNEE PAIN: ICD-10-CM

## 2018-03-20 DIAGNOSIS — G89.29 BILATERAL CHRONIC KNEE PAIN: ICD-10-CM

## 2018-03-20 DIAGNOSIS — G89.29 CHRONIC BACK PAIN, UNSPECIFIED BACK LOCATION, UNSPECIFIED BACK PAIN LATERALITY: ICD-10-CM

## 2018-03-20 DIAGNOSIS — M25.561 BILATERAL CHRONIC KNEE PAIN: ICD-10-CM

## 2018-03-20 PROCEDURE — 1036F TOBACCO NON-USER: CPT | Performed by: NURSE PRACTITIONER

## 2018-03-20 PROCEDURE — 99213 OFFICE O/P EST LOW 20 MIN: CPT | Performed by: NURSE PRACTITIONER

## 2018-03-20 PROCEDURE — G8427 DOCREV CUR MEDS BY ELIG CLIN: HCPCS | Performed by: NURSE PRACTITIONER

## 2018-03-20 PROCEDURE — G8484 FLU IMMUNIZE NO ADMIN: HCPCS | Performed by: NURSE PRACTITIONER

## 2018-03-20 PROCEDURE — G8417 CALC BMI ABV UP PARAM F/U: HCPCS | Performed by: NURSE PRACTITIONER

## 2018-03-20 NOTE — PROGRESS NOTES
Group Lifestyle Balance Follow-up Progress Note      Subjective     Patient is here for group medical appointment for Group Lifestyle Balance weight management program follow-up for the chronic conditions of HTN, HLD, Chronic Back Pain, Schizophrenia and Depression (has followed with [facility] since 1992), Prediabetes, Arthritis, Chronic Bilateral Knee Pain  Patient continues on the program and tolerating well. Total weight gain of 3 lbs. No current issues. Allergies: Allergies   Allergen Reactions    Lisinopril      Makes her cough       Past Medical History:     Past Medical History:   Diagnosis Date    Asthma     Bronchitis 2009    Chronic back pain     Depression 5/1/2014    Diabetes mellitus ([address]     Type II    Frequency of urination     Hyperlipidemia     Hypertension     Obesity     Palpitations     occasional    Schizophrenia ([address]     Sleep apnea since Oct. 2017    CPAP nightly    Wears glasses    . Past Surgical History:  Past Surgical History:   Procedure Laterality Date    DILATION AND CURETTAGE OF UTERUS N/A 11/14/2017    OPERATIVE HYSTEROSCOPY WITH Airam Codey, EXCISION ENDOMETRIAL POLYPS performed by Deanna Yoo MD at [address] HYSTEROSCOPY  11/14/2017    exc endometrial polyps    OTHER SURGICAL HISTORY Right 10/17/14    excision dorsal exostosis rt foot       Family History:  Family History   Problem Relation Age of Onset    Diabetes Mother     Heart Failure Mother     Diabetes Maternal Grandmother     Heart Failure Maternal Grandmother     No Known Problems Maternal Grandfather     No Known Problems Paternal Grandmother     No Known Problems Paternal Grandfather     Cancer Paternal Aunt        Social History:  Social History     Social History    Marital status: Single     Spouse name: N/A    Number of children: 0    Years of education: N/A     Occupational History    Not on file.      Social History Main Topics    Smoking status: Passive Smoke Exposure

## 2018-03-28 ENCOUNTER — OFFICE VISIT (OUTPATIENT)
Dept: PULMONOLOGY | Age: 48
End: 2018-03-28
Payer: MEDICARE

## 2018-03-28 VITALS
HEIGHT: 65 IN | RESPIRATION RATE: 15 BRPM | WEIGHT: 293 LBS | TEMPERATURE: 98.1 F | SYSTOLIC BLOOD PRESSURE: 130 MMHG | OXYGEN SATURATION: 96 % | DIASTOLIC BLOOD PRESSURE: 75 MMHG | BODY MASS INDEX: 48.82 KG/M2 | HEART RATE: 93 BPM

## 2018-03-28 DIAGNOSIS — G47.33 OBSTRUCTIVE SLEEP APNEA: ICD-10-CM

## 2018-03-28 DIAGNOSIS — J30.9 ALLERGIC RHINITIS, UNSPECIFIED CHRONICITY, UNSPECIFIED SEASONALITY, UNSPECIFIED TRIGGER: ICD-10-CM

## 2018-03-28 DIAGNOSIS — J45.909 PERSISTENT ASTHMA WITHOUT COMPLICATION, UNSPECIFIED ASTHMA SEVERITY: ICD-10-CM

## 2018-03-28 DIAGNOSIS — E66.01 MORBID OBESITY WITH BMI OF 50.0-59.9, ADULT (HCC): ICD-10-CM

## 2018-03-28 DIAGNOSIS — J45.30 MILD PERSISTENT ASTHMA WITHOUT COMPLICATION: Primary | ICD-10-CM

## 2018-03-28 PROCEDURE — 99214 OFFICE O/P EST MOD 30 MIN: CPT | Performed by: INTERNAL MEDICINE

## 2018-03-28 PROCEDURE — G8417 CALC BMI ABV UP PARAM F/U: HCPCS | Performed by: INTERNAL MEDICINE

## 2018-03-28 PROCEDURE — 1036F TOBACCO NON-USER: CPT | Performed by: INTERNAL MEDICINE

## 2018-03-28 PROCEDURE — G8428 CUR MEDS NOT DOCUMENT: HCPCS | Performed by: INTERNAL MEDICINE

## 2018-03-28 PROCEDURE — G8484 FLU IMMUNIZE NO ADMIN: HCPCS | Performed by: INTERNAL MEDICINE

## 2018-03-28 ASSESSMENT — SLEEP AND FATIGUE QUESTIONNAIRES
HOW LIKELY ARE YOU TO NOD OFF OR FALL ASLEEP WHILE SITTING AND TALKING TO SOMEONE: 0
HOW LIKELY ARE YOU TO NOD OFF OR FALL ASLEEP WHILE SITTING AND READING: 0
HOW LIKELY ARE YOU TO NOD OFF OR FALL ASLEEP IN A CAR, WHILE STOPPED FOR A FEW MINUTES IN TRAFFIC: 0
HOW LIKELY ARE YOU TO NOD OFF OR FALL ASLEEP WHILE SITTING QUIETLY AFTER LUNCH WITHOUT ALCOHOL: 0
HOW LIKELY ARE YOU TO NOD OFF OR FALL ASLEEP WHEN YOU ARE A PASSENGER IN A CAR FOR AN HOUR WITHOUT A BREAK: 0
ESS TOTAL SCORE: 2
HOW LIKELY ARE YOU TO NOD OFF OR FALL ASLEEP WHILE WATCHING TV: 0
NECK CIRCUMFERENCE (INCHES): 0
HOW LIKELY ARE YOU TO NOD OFF OR FALL ASLEEP WHILE LYING DOWN TO REST IN THE AFTERNOON WHEN CIRCUMSTANCES PERMIT: 2
HOW LIKELY ARE YOU TO NOD OFF OR FALL ASLEEP WHILE SITTING INACTIVE IN A PUBLIC PLACE: 0

## 2018-03-28 NOTE — PROGRESS NOTES
OUTPATIENT PULMONARY PROGRESS NOTE      Patient:  Denver Fray  MRN: [id number]    Consulting Physician: Dr Ling Chaudhari  Reason for Consult: Obstructive Sleep Apnea  Primacy Care Physician: Tami Mario    HISTORY OF PRESENT ILLNESS:   The patient is a 49 y.o. female   She was initially sent here for evaluation of obstructive sleep apnea  She was seen last year and she was supposed to be on auto CPAP 10-20. She was started on auto CPAP but she was not compliant with CPAP and she claims she uses it for some time but for some reason she stopped using it she claimed that she was having problem with the mask, her compliance data shows suboptimal compliance, she had problem at home also using CPAP at that time, she claimed that she had a cat at home and at that time she was not able to use it because of Scratching on her mask, also she was having problem with the mask and she went to [employer] according to patient that she wanted to change the mask as she was not compliant with CPAP her CPAP was taken away. Also she was told that she had to pay $300 month and $60 for the mask so she was not able to afford that and was not able to pay for CPAP 10. She claimed that she is having symptoms of daytime sleepiness tightness fatigue and also unrefreshed sleep and fragmented sleep and she realizes importance of CPAP and the complication which may be associated with sleep apnea as it was explained in the past and now she is willing to use the CPAP and wanted new prescription for CPAP.     She is a history of asthma which was mild persistent asthma she claimed that her asthma is under control, she is not sure whether she is on Qvar but she claimed that she is on pro-air and albuterol and she use it twice a day, she denies daily cough wheezing chest tightness, she denies waking up at night with chest tightness wheezing and shortness of breath    Her allergic rhinitis symptoms are also under control and she use Flonase as needed, her cough apnea since Oct. 2017    CPAP nightly    Wears glasses        Past Surgical History:        Procedure Laterality Date    DILATION AND CURETTAGE OF UTERUS N/A 11/14/2017    OPERATIVE HYSTEROSCOPY WITH MYOSURE, EXCISION ENDOMETRIAL POLYPS performed by Franklyn Blackwood MD at [address] [place] [address] HYSTEROSCOPY  11/14/2017    exc endometrial polyps    OTHER SURGICAL HISTORY Right 10/17/14    excision dorsal exostosis rt foot       Allergies: Allergies   Allergen Reactions    Lisinopril      Makes her cough         Home Meds:   Outpatient Encounter Prescriptions as of 3/28/2018   Medication Sig Dispense Refill    medroxyPROGESTERone (PROVERA) 10 MG tablet 1 TABLET DAILY FOR THE FIRST 21 DAYS OF EACH MONTH 21 tablet 3    beclomethasone (QVAR) 40 MCG/ACT inhaler Inhale 2 puffs into the lungs 2 times daily 1 Inhaler 5    albuterol sulfate  (90 Base) MCG/ACT inhaler Inhale 2 puffs into the lungs every 6 hours as needed for Wheezing 1 Inhaler 3    ibuprofen (ADVIL;MOTRIN) 800 MG tablet Take 1 tablet by mouth every 6 hours as needed for Pain 30 tablet 0    ondansetron (ZOFRAN-ODT) 4 MG disintegrating tablet Take 1 tablet by mouth every 8 hours as needed for Nausea or Vomiting 6 tablet 0    DICLOFENAC POTASSIUM PO Take 50 mg by mouth 3 times daily as needed      ARIPiprazole (ABILIFY) 15 MG tablet Take 15 mg by mouth nightly       traZODone (DESYREL) 50 MG tablet Take 50 mg by mouth nightly      pravastatin (PRAVACHOL) 80 MG tablet Take 80 mg by mouth nightly       oxybutynin (DITROPAN-XL) 10 MG extended release tablet Take 10 mg by mouth daily      gabapentin (NEURONTIN) 100 MG capsule Take 100 mg by mouth 3 times daily.  potassium chloride (MICRO-K) 10 MEQ CR capsule Take 10 mEq by mouth daily.  valsartan (DIOVAN) 40 MG tablet Take 40 mg by mouth nightly Patient takes at night      metFORMIN (GLUCOPHAGE) 500 MG tablet Take 500 mg by mouth daily (with breakfast).       hydrochlorothiazide (MICROZIDE) 16.0 g/dL Final     Platelets   Date Value Ref Range Status   01/15/2018 279 K/µL Final   10/30/2017 235 138 - 453 k/uL Final   10/03/2014 271 150 - 450 k/uL Final     BMP:   Sodium   Date Value Ref Range Status   01/15/2018 142 mmol/L Final   10/30/2017 146 (H) 135 - 144 mmol/L Final   05/12/2017 142 135 - 144 mmol/L Final     Potassium   Date Value Ref Range Status   01/15/2018 3.4 mmol/L Final   10/30/2017 3.5 (L) 3.7 - 5.3 mmol/L Final   05/12/2017 3.3 (L) 3.7 - 5.3 mmol/L Final     Chloride   Date Value Ref Range Status   01/15/2018 106 mmol/L Final   10/30/2017 105 98 - 107 mmol/L Final   05/12/2017 102 98 - 107 mmol/L Final     CO2   Date Value Ref Range Status   01/15/2018 28 mmol/L Final   10/30/2017 24 20 - 31 mmol/L Final   05/12/2017 26 20 - 31 mmol/L Final     BUN   Date Value Ref Range Status   01/15/2018 14 mg/dL Final   10/30/2017 13 6 - 20 mg/dL Final   05/12/2017 12 6 - 20 mg/dL Final     CREATININE   Date Value Ref Range Status   01/15/2018 0.98  Final   10/30/2017 0.78 0.50 - 0.90 mg/dL Final   05/12/2017 0.76 0.50 - 0.90 mg/dL Final     Glucose   Date Value Ref Range Status   01/15/2018 90 mg/dL Final   10/30/2017 82 70 - 99 mg/dL Final     Comment:     [facility] [address] [address] [phone]   05/12/2017 90 70 - 99 mg/dL Final     Hepatic:   AST   Date Value Ref Range Status   01/15/2018 13 U/L Final   10/07/2016 14 <32 U/L Final   10/12/2015 15 <32 U/L Final     ALT   Date Value Ref Range Status   01/15/2018 14 U/L Final   10/30/2017 21 5 - 33 U/L Final     Comment:     [facility] [address] [address] [phone]   10/07/2016 16 5 - 33 U/L Final     Total Bilirubin   Date Value Ref Range Status   01/15/2018 13 (A) 0.1 - 1.4 mg/dL Final   10/07/2016 0.25 (L) 0.3 - 1.2 mg/dL Final   10/12/2015 0.32 0.3 - 1.2 mg/dL Final     Alkaline Phosphatase   Date Value Ref Range Status   01/15/2018 49 U/L Final   10/30/2017 79 35 - 104 U/L Final     Comment:

## 2018-04-03 ENCOUNTER — TELEPHONE (OUTPATIENT)
Dept: PULMONOLOGY | Age: 48
End: 2018-04-03

## 2018-04-03 DIAGNOSIS — J45.909 PERSISTENT ASTHMA WITHOUT COMPLICATION, UNSPECIFIED ASTHMA SEVERITY: ICD-10-CM

## 2018-04-03 RX ORDER — FLUTICASONE PROPIONATE 110 UG/1
1 AEROSOL, METERED RESPIRATORY (INHALATION) 2 TIMES DAILY
Qty: 1 INHALER | Refills: 11 | Status: SHIPPED | OUTPATIENT
Start: 2018-04-03 | End: 2022-01-19

## 2018-04-04 ENCOUNTER — OFFICE VISIT (OUTPATIENT)
Dept: OBGYN | Age: 48
End: 2018-04-04
Payer: MEDICARE

## 2018-04-04 ENCOUNTER — HOSPITAL ENCOUNTER (OUTPATIENT)
Age: 48
Setting detail: SPECIMEN
Discharge: HOME | End: 2018-04-04
Payer: MEDICARE

## 2018-04-04 ENCOUNTER — TELEPHONE (OUTPATIENT)
Dept: OBGYN | Age: 48
End: 2018-04-04

## 2018-04-04 VITALS
TEMPERATURE: 97 F | HEART RATE: 74 BPM | BODY MASS INDEX: 48.82 KG/M2 | SYSTOLIC BLOOD PRESSURE: 132 MMHG | WEIGHT: 293 LBS | RESPIRATION RATE: 18 BRPM | HEIGHT: 65 IN | DIASTOLIC BLOOD PRESSURE: 73 MMHG

## 2018-04-04 DIAGNOSIS — N93.9 ABNORMAL UTERINE BLEEDING (AUB): Primary | ICD-10-CM

## 2018-04-04 DIAGNOSIS — E80.6 HYPERBILIRUBINEMIA: ICD-10-CM

## 2018-04-04 LAB
BILIRUB SERPL-MCNC: <0.1 MG/DL (ref 0.3–1.2)
BILIRUBIN DIRECT: <0.08 MG/DL
BILIRUBIN, INDIRECT: ABNORMAL MG/DL (ref 0–1)

## 2018-04-04 PROCEDURE — 82247 BILIRUBIN TOTAL: CPT

## 2018-04-04 PROCEDURE — 99213 OFFICE O/P EST LOW 20 MIN: CPT | Performed by: OBSTETRICS & GYNECOLOGY

## 2018-04-04 PROCEDURE — 36415 COLL VENOUS BLD VENIPUNCTURE: CPT

## 2018-04-04 PROCEDURE — G8427 DOCREV CUR MEDS BY ELIG CLIN: HCPCS | Performed by: OBSTETRICS & GYNECOLOGY

## 2018-04-04 PROCEDURE — G8417 CALC BMI ABV UP PARAM F/U: HCPCS | Performed by: OBSTETRICS & GYNECOLOGY

## 2018-04-04 PROCEDURE — 82248 BILIRUBIN DIRECT: CPT

## 2018-04-04 PROCEDURE — 99212 OFFICE O/P EST SF 10 MIN: CPT

## 2018-04-04 PROCEDURE — 1036F TOBACCO NON-USER: CPT | Performed by: OBSTETRICS & GYNECOLOGY

## 2018-04-04 RX ORDER — MEDROXYPROGESTERONE ACETATE 10 MG/1
10 TABLET ORAL DAILY
Qty: 10 TABLET | Refills: 5 | Status: SHIPPED | OUTPATIENT
Start: 2018-04-04 | End: 2019-02-28 | Stop reason: ALTCHOICE

## 2018-04-20 ENCOUNTER — OFFICE VISIT (OUTPATIENT)
Dept: BARIATRICS/WEIGHT MGMT | Age: 48
End: 2018-04-20
Payer: MEDICARE

## 2018-04-20 VITALS
DIASTOLIC BLOOD PRESSURE: 84 MMHG | SYSTOLIC BLOOD PRESSURE: 126 MMHG | WEIGHT: 293 LBS | HEART RATE: 78 BPM | HEIGHT: 65 IN | BODY MASS INDEX: 48.82 KG/M2

## 2018-04-20 DIAGNOSIS — M25.561 BILATERAL CHRONIC KNEE PAIN: ICD-10-CM

## 2018-04-20 DIAGNOSIS — G89.29 CHRONIC BACK PAIN, UNSPECIFIED BACK LOCATION, UNSPECIFIED BACK PAIN LATERALITY: ICD-10-CM

## 2018-04-20 DIAGNOSIS — I10 ESSENTIAL HYPERTENSION: Primary | ICD-10-CM

## 2018-04-20 DIAGNOSIS — F20.9 SCHIZOPHRENIA, UNSPECIFIED TYPE (HCC): ICD-10-CM

## 2018-04-20 DIAGNOSIS — G89.29 BILATERAL CHRONIC KNEE PAIN: ICD-10-CM

## 2018-04-20 DIAGNOSIS — E78.5 HYPERLIPIDEMIA, UNSPECIFIED HYPERLIPIDEMIA TYPE: ICD-10-CM

## 2018-04-20 DIAGNOSIS — M19.90 ARTHRITIS: ICD-10-CM

## 2018-04-20 DIAGNOSIS — E66.01 MORBID OBESITY WITH BMI OF 50.0-59.9, ADULT (HCC): ICD-10-CM

## 2018-04-20 DIAGNOSIS — M54.9 CHRONIC BACK PAIN, UNSPECIFIED BACK LOCATION, UNSPECIFIED BACK PAIN LATERALITY: ICD-10-CM

## 2018-04-20 DIAGNOSIS — F32.A DEPRESSION, UNSPECIFIED DEPRESSION TYPE: ICD-10-CM

## 2018-04-20 DIAGNOSIS — R73.03 PREDIABETES: ICD-10-CM

## 2018-04-20 DIAGNOSIS — E28.2 PCOS (POLYCYSTIC OVARIAN SYNDROME): ICD-10-CM

## 2018-04-20 DIAGNOSIS — M25.562 BILATERAL CHRONIC KNEE PAIN: ICD-10-CM

## 2018-04-20 PROCEDURE — G8427 DOCREV CUR MEDS BY ELIG CLIN: HCPCS | Performed by: NURSE PRACTITIONER

## 2018-04-20 PROCEDURE — 99213 OFFICE O/P EST LOW 20 MIN: CPT | Performed by: NURSE PRACTITIONER

## 2018-04-20 PROCEDURE — G8417 CALC BMI ABV UP PARAM F/U: HCPCS | Performed by: NURSE PRACTITIONER

## 2018-04-20 PROCEDURE — 1036F TOBACCO NON-USER: CPT | Performed by: NURSE PRACTITIONER

## 2018-04-23 ENCOUNTER — TELEPHONE (OUTPATIENT)
Dept: PULMONOLOGY | Age: 48
End: 2018-04-23

## 2018-04-23 DIAGNOSIS — G47.33 OSA (OBSTRUCTIVE SLEEP APNEA): Primary | ICD-10-CM

## 2018-05-04 ENCOUNTER — OFFICE VISIT (OUTPATIENT)
Dept: BARIATRICS/WEIGHT MGMT | Age: 48
End: 2018-05-04
Payer: MEDICARE

## 2018-05-04 VITALS
SYSTOLIC BLOOD PRESSURE: 124 MMHG | WEIGHT: 293 LBS | BODY MASS INDEX: 48.82 KG/M2 | HEART RATE: 68 BPM | HEIGHT: 65 IN | DIASTOLIC BLOOD PRESSURE: 74 MMHG

## 2018-05-04 DIAGNOSIS — M54.9 CHRONIC BACK PAIN, UNSPECIFIED BACK LOCATION, UNSPECIFIED BACK PAIN LATERALITY: Primary | ICD-10-CM

## 2018-05-04 DIAGNOSIS — E66.01 MORBID OBESITY WITH BMI OF 50.0-59.9, ADULT (HCC): ICD-10-CM

## 2018-05-04 DIAGNOSIS — F20.9 SCHIZOPHRENIA, UNSPECIFIED TYPE (HCC): ICD-10-CM

## 2018-05-04 DIAGNOSIS — M25.561 BILATERAL CHRONIC KNEE PAIN: ICD-10-CM

## 2018-05-04 DIAGNOSIS — G89.29 CHRONIC BACK PAIN, UNSPECIFIED BACK LOCATION, UNSPECIFIED BACK PAIN LATERALITY: Primary | ICD-10-CM

## 2018-05-04 DIAGNOSIS — F32.A DEPRESSION, UNSPECIFIED DEPRESSION TYPE: ICD-10-CM

## 2018-05-04 DIAGNOSIS — E28.2 PCOS (POLYCYSTIC OVARIAN SYNDROME): ICD-10-CM

## 2018-05-04 DIAGNOSIS — E78.5 HYPERLIPIDEMIA, UNSPECIFIED HYPERLIPIDEMIA TYPE: ICD-10-CM

## 2018-05-04 DIAGNOSIS — M25.562 BILATERAL CHRONIC KNEE PAIN: ICD-10-CM

## 2018-05-04 DIAGNOSIS — R73.03 PREDIABETES: ICD-10-CM

## 2018-05-04 DIAGNOSIS — G89.29 BILATERAL CHRONIC KNEE PAIN: ICD-10-CM

## 2018-05-04 DIAGNOSIS — M19.90 ARTHRITIS: ICD-10-CM

## 2018-05-04 DIAGNOSIS — I10 ESSENTIAL HYPERTENSION: ICD-10-CM

## 2018-05-04 PROCEDURE — 99213 OFFICE O/P EST LOW 20 MIN: CPT | Performed by: NURSE PRACTITIONER

## 2018-05-04 PROCEDURE — 1036F TOBACCO NON-USER: CPT | Performed by: NURSE PRACTITIONER

## 2018-05-04 PROCEDURE — G8417 CALC BMI ABV UP PARAM F/U: HCPCS | Performed by: NURSE PRACTITIONER

## 2018-05-04 PROCEDURE — G8427 DOCREV CUR MEDS BY ELIG CLIN: HCPCS | Performed by: NURSE PRACTITIONER

## 2018-05-11 ENCOUNTER — HOSPITAL ENCOUNTER (OUTPATIENT)
Dept: SLEEP CENTER | Age: 48
Discharge: HOME | End: 2018-05-13
Payer: MEDICARE

## 2018-05-11 VITALS
BODY MASS INDEX: 48.82 KG/M2 | DIASTOLIC BLOOD PRESSURE: 75 MMHG | WEIGHT: 293 LBS | HEIGHT: 65 IN | SYSTOLIC BLOOD PRESSURE: 130 MMHG

## 2018-05-11 DIAGNOSIS — G47.33 OSA (OBSTRUCTIVE SLEEP APNEA): ICD-10-CM

## 2018-05-11 PROCEDURE — 95811 POLYSOM 6/>YRS CPAP 4/> PARM: CPT

## 2018-06-15 ENCOUNTER — OFFICE VISIT (OUTPATIENT)
Dept: BARIATRICS/WEIGHT MGMT | Age: 48
End: 2018-06-15
Payer: MEDICARE

## 2018-06-15 VITALS
HEART RATE: 76 BPM | WEIGHT: 293 LBS | DIASTOLIC BLOOD PRESSURE: 82 MMHG | BODY MASS INDEX: 48.82 KG/M2 | SYSTOLIC BLOOD PRESSURE: 126 MMHG | HEIGHT: 65 IN

## 2018-06-15 DIAGNOSIS — M19.90 ARTHRITIS: ICD-10-CM

## 2018-06-15 DIAGNOSIS — F20.9 SCHIZOPHRENIA, UNSPECIFIED TYPE (HCC): ICD-10-CM

## 2018-06-15 DIAGNOSIS — I10 ESSENTIAL HYPERTENSION: ICD-10-CM

## 2018-06-15 DIAGNOSIS — R73.03 PREDIABETES: ICD-10-CM

## 2018-06-15 DIAGNOSIS — M54.9 CHRONIC BACK PAIN, UNSPECIFIED BACK LOCATION, UNSPECIFIED BACK PAIN LATERALITY: ICD-10-CM

## 2018-06-15 DIAGNOSIS — E66.01 MORBID OBESITY WITH BMI OF 50.0-59.9, ADULT (HCC): ICD-10-CM

## 2018-06-15 DIAGNOSIS — E78.5 HYPERLIPIDEMIA, UNSPECIFIED HYPERLIPIDEMIA TYPE: ICD-10-CM

## 2018-06-15 DIAGNOSIS — G89.29 CHRONIC BACK PAIN, UNSPECIFIED BACK LOCATION, UNSPECIFIED BACK PAIN LATERALITY: ICD-10-CM

## 2018-06-15 DIAGNOSIS — M25.561 BILATERAL CHRONIC KNEE PAIN: ICD-10-CM

## 2018-06-15 DIAGNOSIS — G89.29 BILATERAL CHRONIC KNEE PAIN: ICD-10-CM

## 2018-06-15 DIAGNOSIS — E28.2 PCOS (POLYCYSTIC OVARIAN SYNDROME): Primary | ICD-10-CM

## 2018-06-15 DIAGNOSIS — M25.562 BILATERAL CHRONIC KNEE PAIN: ICD-10-CM

## 2018-06-15 PROCEDURE — 1036F TOBACCO NON-USER: CPT | Performed by: NURSE PRACTITIONER

## 2018-06-15 PROCEDURE — 99213 OFFICE O/P EST LOW 20 MIN: CPT | Performed by: NURSE PRACTITIONER

## 2018-06-15 PROCEDURE — G8417 CALC BMI ABV UP PARAM F/U: HCPCS | Performed by: NURSE PRACTITIONER

## 2018-06-15 PROCEDURE — G8427 DOCREV CUR MEDS BY ELIG CLIN: HCPCS | Performed by: NURSE PRACTITIONER

## 2018-07-03 ENCOUNTER — OFFICE VISIT (OUTPATIENT)
Dept: BARIATRICS/WEIGHT MGMT | Age: 48
End: 2018-07-03
Payer: MEDICARE

## 2018-07-03 VITALS
HEIGHT: 65 IN | HEART RATE: 78 BPM | BODY MASS INDEX: 48.82 KG/M2 | DIASTOLIC BLOOD PRESSURE: 72 MMHG | WEIGHT: 293 LBS | SYSTOLIC BLOOD PRESSURE: 126 MMHG

## 2018-07-03 DIAGNOSIS — M25.561 BILATERAL CHRONIC KNEE PAIN: ICD-10-CM

## 2018-07-03 DIAGNOSIS — I10 ESSENTIAL HYPERTENSION: ICD-10-CM

## 2018-07-03 DIAGNOSIS — F20.9 SCHIZOPHRENIA, UNSPECIFIED TYPE (HCC): ICD-10-CM

## 2018-07-03 DIAGNOSIS — E28.2 PCOS (POLYCYSTIC OVARIAN SYNDROME): ICD-10-CM

## 2018-07-03 DIAGNOSIS — E66.01 MORBID OBESITY WITH BMI OF 50.0-59.9, ADULT (HCC): ICD-10-CM

## 2018-07-03 DIAGNOSIS — G89.29 CHRONIC BACK PAIN, UNSPECIFIED BACK LOCATION, UNSPECIFIED BACK PAIN LATERALITY: ICD-10-CM

## 2018-07-03 DIAGNOSIS — G89.29 BILATERAL CHRONIC KNEE PAIN: ICD-10-CM

## 2018-07-03 DIAGNOSIS — R73.03 PREDIABETES: Primary | ICD-10-CM

## 2018-07-03 DIAGNOSIS — M25.562 BILATERAL CHRONIC KNEE PAIN: ICD-10-CM

## 2018-07-03 DIAGNOSIS — E78.5 HYPERLIPIDEMIA, UNSPECIFIED HYPERLIPIDEMIA TYPE: ICD-10-CM

## 2018-07-03 DIAGNOSIS — M19.90 ARTHRITIS: ICD-10-CM

## 2018-07-03 DIAGNOSIS — M54.9 CHRONIC BACK PAIN, UNSPECIFIED BACK LOCATION, UNSPECIFIED BACK PAIN LATERALITY: ICD-10-CM

## 2018-07-03 PROCEDURE — G8427 DOCREV CUR MEDS BY ELIG CLIN: HCPCS | Performed by: NURSE PRACTITIONER

## 2018-07-03 PROCEDURE — 1036F TOBACCO NON-USER: CPT | Performed by: NURSE PRACTITIONER

## 2018-07-03 PROCEDURE — 99213 OFFICE O/P EST LOW 20 MIN: CPT | Performed by: NURSE PRACTITIONER

## 2018-07-03 PROCEDURE — G8417 CALC BMI ABV UP PARAM F/U: HCPCS | Performed by: NURSE PRACTITIONER

## 2018-07-03 NOTE — PROGRESS NOTES
Group Lifestyle Balance Follow-up Progress Note      Subjective     Patient is here for group medical appointment for Group Lifestyle Balance weight management program follow-up for the chronic conditions of HTN, HLD, Chronic Back Pain, Schizophrenia and Depression (has followed with [facility] since 1992), Prediabetes, Arthritis, Chronic Bilateral Knee Pain  Patient continues on the program and tolerating well. Total weight loss of 9 lbs. No current issues. Allergies: Allergies   Allergen Reactions    Lisinopril      Makes her cough       Past Medical History:     Past Medical History:   Diagnosis Date    Asthma     Bronchitis 2009    Chronic back pain     Depression 5/1/2014    Diabetes mellitus ([address]     Type II    Frequency of urination     Hyperlipidemia     Hypertension     Obesity     Palpitations     occasional    [facility]     Sleep apnea since Oct. 2017    CPAP nightly    Wears glasses    . Past Surgical History:  Past Surgical History:   Procedure Laterality Date    DILATION AND CURETTAGE OF UTERUS N/A 11/14/2017    OPERATIVE HYSTEROSCOPY WITH Keenan Sharp, EXCISION ENDOMETRIAL POLYPS performed by Tami Hutchinson MD at [address] [place] [address] Drive HYSTEROSCOPY  11/14/2017    exc endometrial polyps    OTHER SURGICAL HISTORY Right 10/17/14    excision dorsal exostosis rt foot       Family History:  Family History   Problem Relation Age of Onset    Diabetes Mother     Heart Failure Mother     Diabetes Maternal Grandmother     Heart Failure Maternal Grandmother     No Known Problems Maternal Grandfather     No Known Problems Paternal Grandmother     No Known Problems Paternal Grandfather     Cancer Paternal Aunt        Social History:  Social History     Social History    Marital status: Single     Spouse name: N/A    Number of children: 0    Years of education: N/A     Occupational History    Not on file.      Social History Main Topics    Smoking status: Passive Smoke Exposure - Never Smoker    Smokeless tobacco: Never Used      Comment: associates smoking    Alcohol use No    Drug use: No    Sexual activity: No     Other Topics Concern    Not on file     Social History Narrative    No narrative on file       Current Medications:  Current Outpatient Prescriptions   Medication Sig Dispense Refill    medroxyPROGESTERone (PROVERA) 10 MG tablet Take 1 tablet by mouth daily for 10 days 1 tablet daily for the first 10 days of each month. 10 tablet 5    fluticasone (FLOVENT HFA) 110 MCG/ACT inhaler Inhale 1 puff into the lungs 2 times daily 1 Inhaler 11    albuterol sulfate  (90 Base) MCG/ACT inhaler Inhale 2 puffs into the lungs every 6 hours as needed for Wheezing 1 Inhaler 3    ibuprofen (ADVIL;MOTRIN) 800 MG tablet Take 1 tablet by mouth every 6 hours as needed for Pain 30 tablet 0    ondansetron (ZOFRAN-ODT) 4 MG disintegrating tablet Take 1 tablet by mouth every 8 hours as needed for Nausea or Vomiting 6 tablet 0    DICLOFENAC POTASSIUM PO Take 50 mg by mouth 3 times daily as needed      ARIPiprazole (ABILIFY) 15 MG tablet Take 15 mg by mouth nightly       traZODone (DESYREL) 50 MG tablet Take 50 mg by mouth nightly      pravastatin (PRAVACHOL) 80 MG tablet Take 80 mg by mouth nightly       oxybutynin (DITROPAN-XL) 10 MG extended release tablet Take 10 mg by mouth daily      gabapentin (NEURONTIN) 100 MG capsule Take 100 mg by mouth 3 times daily.  potassium chloride (MICRO-K) 10 MEQ CR capsule Take 10 mEq by mouth daily.  valsartan (DIOVAN) 40 MG tablet Take 40 mg by mouth nightly Patient takes at night      metFORMIN (GLUCOPHAGE) 500 MG tablet Take 500 mg by mouth daily (with breakfast).  hydrochlorothiazide (MICROZIDE) 12.5 MG capsule Take 12.5 mg by mouth nightly       amLODIPine (NORVASC) 10 MG tablet Take 10 mg by mouth nightly Patient takes at night      simvastatin (ZOCOR) 20 MG tablet Take 80 mg by mouth nightly.        No current time:  90 minutes, greater than 50% of visit spent in group counseling/education. Assessment:       Diagnosis Orders   1. Prediabetes     2. Arthritis     3. Bilateral chronic knee pain     4. Essential hypertension     5. Hyperlipidemia, unspecified hyperlipidemia type     6. Chronic back pain, unspecified back location, unspecified back pain laterality     7. PCOS (polycystic ovarian syndrome)     8. Schizophrenia, unspecified type ([facility]     9. Morbid obesity with BMI of 50.0-59.9, adult ([facility]         Plan:      Track food and weight. Return to clinic as per group medical appointment schedule. Follow-up:  Return in about 1 month (around 8/3/2018). Orders:  No orders of the defined types were placed in this encounter. Prescriptions:  No orders of the defined types were placed in this encounter.       Electronically signed by:  Nereyda Hopson CNP

## 2018-08-27 PROBLEM — E11.9 DIABETES MELLITUS WITHOUT COMPLICATION (HCC): Status: ACTIVE | Noted: 2017-04-12

## 2018-08-27 PROBLEM — H52.10 MYOPIA: Status: ACTIVE | Noted: 2017-04-12

## 2018-08-27 PROBLEM — N39.41 URGE INCONTINENCE OF URINE: Status: ACTIVE | Noted: 2018-08-27

## 2018-08-27 PROBLEM — H52.229 ASTIGMATISM, REGULAR: Status: ACTIVE | Noted: 2017-04-12

## 2018-08-27 PROBLEM — R35.0 INCREASED FREQUENCY OF URINATION: Status: ACTIVE | Noted: 2018-08-27

## 2018-08-27 PROBLEM — H40.019 OAG (OPEN ANGLE GLAUCOMA) SUSPECT, LOW RISK: Status: ACTIVE | Noted: 2017-05-04

## 2018-08-28 ENCOUNTER — OFFICE VISIT (OUTPATIENT)
Dept: PULMONOLOGY | Age: 48
End: 2018-08-28
Payer: MEDICARE

## 2018-08-28 VITALS
OXYGEN SATURATION: 95 % | TEMPERATURE: 96.9 F | WEIGHT: 293 LBS | SYSTOLIC BLOOD PRESSURE: 147 MMHG | HEART RATE: 68 BPM | DIASTOLIC BLOOD PRESSURE: 85 MMHG | RESPIRATION RATE: 16 BRPM | HEIGHT: 65 IN | BODY MASS INDEX: 48.82 KG/M2

## 2018-08-28 DIAGNOSIS — Z99.89 OSA ON CPAP: Primary | ICD-10-CM

## 2018-08-28 DIAGNOSIS — E66.01 MORBID OBESITY WITH BMI OF 50.0-59.9, ADULT (HCC): ICD-10-CM

## 2018-08-28 DIAGNOSIS — J30.89 NON-SEASONAL ALLERGIC RHINITIS, UNSPECIFIED TRIGGER: ICD-10-CM

## 2018-08-28 DIAGNOSIS — G47.33 OSA ON CPAP: Primary | ICD-10-CM

## 2018-08-28 DIAGNOSIS — J45.20 INTERMITTENT ASTHMA WITHOUT COMPLICATION, UNSPECIFIED ASTHMA SEVERITY: ICD-10-CM

## 2018-08-28 PROCEDURE — 99214 OFFICE O/P EST MOD 30 MIN: CPT | Performed by: INTERNAL MEDICINE

## 2018-08-28 PROCEDURE — G8417 CALC BMI ABV UP PARAM F/U: HCPCS | Performed by: INTERNAL MEDICINE

## 2018-08-28 PROCEDURE — G8427 DOCREV CUR MEDS BY ELIG CLIN: HCPCS | Performed by: INTERNAL MEDICINE

## 2018-08-28 PROCEDURE — 1036F TOBACCO NON-USER: CPT | Performed by: INTERNAL MEDICINE

## 2018-08-28 ASSESSMENT — SLEEP AND FATIGUE QUESTIONNAIRES
HOW LIKELY ARE YOU TO NOD OFF OR FALL ASLEEP WHILE SITTING QUIETLY AFTER LUNCH WITHOUT ALCOHOL: 0
HOW LIKELY ARE YOU TO NOD OFF OR FALL ASLEEP WHILE SITTING AND TALKING TO SOMEONE: 0
HOW LIKELY ARE YOU TO NOD OFF OR FALL ASLEEP IN A CAR, WHILE STOPPED FOR A FEW MINUTES IN TRAFFIC: 0
HOW LIKELY ARE YOU TO NOD OFF OR FALL ASLEEP WHILE WATCHING TV: 1
ESS TOTAL SCORE: 3
HOW LIKELY ARE YOU TO NOD OFF OR FALL ASLEEP WHILE SITTING INACTIVE IN A PUBLIC PLACE: 0
HOW LIKELY ARE YOU TO NOD OFF OR FALL ASLEEP WHILE LYING DOWN TO REST IN THE AFTERNOON WHEN CIRCUMSTANCES PERMIT: 1
HOW LIKELY ARE YOU TO NOD OFF OR FALL ASLEEP WHILE SITTING AND READING: 1
HOW LIKELY ARE YOU TO NOD OFF OR FALL ASLEEP WHEN YOU ARE A PASSENGER IN A CAR FOR AN HOUR WITHOUT A BREAK: 0

## 2018-08-28 NOTE — PROGRESS NOTES
OUTPATIENT PULMONARY PROGRESS NOTE      Patient:  Gamaliel Rhodes  MRN: [id number]    Consulting Physician: Dr Yary Hankins  Reason for Consult: Obstructive Sleep Apnea  Primacy Care Physician: Agus Beck    HISTORY OF PRESENT ILLNESS:   The patient is a 51 y.o. female   She was initially sent here for evaluation of obstructive sleep apnea  After she was seen last time she was is scheduled to have a repeat titration study, before that she was on auto CPAP and after she was re-titrated she was started on CPAP of 9 cm   She is supposed to be on CPAP at 9 cm she is not very compliant with CPAP according to patient she use it couple of nights, she denies any problem using the CPAP but she claimed that she take her psychiatric medication and before she put the CPAP mask on in the evening/night she goes to sleep and some nights she does not put it on  She claimed that she is able to sleep okay at night with the CPAP  Denies naps, no dozing off during the daytime   She stopped using qvar as she was not having cough, not using albuterol and very less frequent use of albuterol  She denies daily cough wheezing chest tightness, she denies waking up at night with chest tightness wheezing and shortness of breath  No c/o post nasal drip and nasal congestion. Sleep  Dry mouth upon awakening. Denies fatigue and tiredness during the day. Goes to sleep at 11 to 12 MN, wakes up 8 am. It takes 15 minutes to fall asleep. Wakes up 2 times at night to go to bathroom. Takes no or very occasional nap during the day ( 30 minutes). No headache in am. No car wrecks or near wrecks because of the sleepiness. No nodding off while driving. No weight gain. No forgetfulness or decreased concentration. No nasal congestion or obstruction at night. No significant caffienated drinks or alcohol. No leg jerks during sleep. No restless feelings in legs at night. No numbness or burning in leg or feet. No leg aches or cramps .  No loss of muscle strength when angry or by mouth daily for 10 days 1 tablet daily for the first 10 days of each month. 10 tablet 5     No facility-administered encounter medications on file as of 8/28/2018. Social History:   TOBACCO:   reports that she is a non-smoker but has been exposed to tobacco smoke. She has never used smokeless tobacco.  ETOH:   reports that she does not drink alcohol. OCCUPATION:  Use to work in [employer]    Family History:   Family History   Problem Relation Age of Onset    Diabetes Mother     Heart Failure Mother     Diabetes Maternal Grandmother     Heart Failure Maternal Grandmother     No Known Problems Maternal Grandfather     No Known Problems Paternal Grandmother     No Known Problems Paternal Grandfather     Cancer Paternal Aunt        Immunizations: There is no immunization history on file for this patient.       REVIEW OF SYSTEMS:    General ROS: positive for  - fatigue and weight gain  ENT ROS: negative for - nasal congestion and nasal discharge  negative for - sneezing, sore throat, tinnitus, vertigo, visual changes or vocal changes  Allergy and Immunology ROS: negative for - nasal congestion, postnasal drip and seasonal allergies  Respiratory ROS: negative for - cough, shortness of breath and no wheezing  negative for - orthopnea, pleuritic pain, sputum changes, stridor or tachypnea  Cardiovascular ROS: positive for - dyspnea on exertion and chronic edema  negative for - chest pain, irregular heartbeat, loss of consciousness, murmur, orthopnea, palpitations, paroxysmal nocturnal dyspnea or rapid heart rate  Gastrointestinal ROS: no abdominal pain, change in bowel habits, or black or bloody stools  Musculoskeletal ROS: positive for - joint pain and joint stiffness  Positive snoring, choking, excessive daytime sleepiness, falling asleep while watching television, disrupted sleep, naps      Physical Exam:    Vitals: BP (!) 147/85 (Site: Right Arm)   Pulse 68   Temp 96.9 °F (36.1 °C)   Resp AMYLASE  Lipase: No results found for: LIPASE  CARDIAC ENZYMES: No results found for: CKTOTAL, CKMB, CKMBINDEX, TROPONINI  BNP: No results found for: BNP  Lipids:   Cholesterol, Total   Date Value Ref Range Status   01/15/2018 174 mg/dL Final     HDL   Date Value Ref Range Status   01/15/2018 42 35 - 70 mg/dL Final     ABGs: No results found for: PHART, PO2ART, OED6VQY  INR: No results found for: INR  Thyroid:   Lab Results   Component Value Date    TSH 1.14 01/15/2018     Urinalysis:   Bacteria, UA   Date Value Ref Range Status   08/25/2017 NOT REPORTED NONE Final     Color, UA   Date Value Ref Range Status   10/30/2017 YELLOW YEL Final     pH, UA   Date Value Ref Range Status   10/30/2017 7.5 5.0 - 8.0 Final     Protein, UA   Date Value Ref Range Status   10/30/2017 NEGATIVE NEG Final     RBC, UA   Date Value Ref Range Status   08/25/2017 TOO NUMEROUS TO COUNT 0 - 2 /HPF Final     Specific Gravity, UA   Date Value Ref Range Status   10/30/2017 1.016 1.005 - 1.030 Final     Bilirubin Urine   Date Value Ref Range Status   10/30/2017 NEGATIVE NEG Final     Nitrite, Urine   Date Value Ref Range Status   10/30/2017 NEGATIVE NEG Final     WBC, UA   Date Value Ref Range Status   08/25/2017 10 TO 20 0 - 5 /HPF Final     Leukocyte Esterase, Urine   Date Value Ref Range Status   10/30/2017 NEGATIVE NEG Final     Glucose, Ur   Date Value Ref Range Status   10/30/2017 NEGATIVE NEG Final     Cultures:-  -----------------------------------------------------------------  CXR      There is no immunization history on file for this patient. Pulmonary Function Test:  9/13/17:  FEV1 2.03 82%, FVC 2.48 81%, FEV1/%, RV 1.48 90%, TLC 3.79 80.8 %, DLCO 16.70 76%    Assessment and Plan       ICD-10-CM ICD-9-CM    1. AISHWARYA on CPAP G47.33 327.23     Z99.89 V46.8    2. Intermittent asthma without complication, unspecified asthma severity J45.20 493.90    3. Non-seasonal allergic rhinitis, unspecified trigger J30.89 477.8    4. Morbid obesity with BMI of 50.0-59.9, adult ([facility]) E66.01 278.01     Z68.43 V85.43         Hypertension    Hyperlipidemia    Schizophrenia ([facility]     Plan and recommendations:    Recommend to continue CPAP at 9 cm and I havel discussed with her about the risk of sleep apnea and also discuss that she needs to be compliant with CPAP  Use Humidifier with cpap  Wt loss is recommended and discussed  Follow good sleep hygeine instructions  Will need compliance data before next visit   Given sleep hygeine instructions    Will watch off Qvar. Albuterol as needed  Continue Flonase nasal spray as needed  Vaccinations recommended and she she get her vaccination from PCP. Annual flu vaccine in fall  Maintain an active lifestyle     Questions answered to pt's satisfaction   RTC 6 months     It was my pleasure to evaluate Zeynep Adams today. Please call with questions. Clary Moritz, MD             8/28/2018, 11:13 AM    Please note that this chart was generated using voice recognition Dragon dictation software. Although every effort was made to ensure the accuracy of this automated transcription, some errors in transcription may have occurred.

## 2018-09-04 ENCOUNTER — NURSE ONLY (OUTPATIENT)
Dept: BARIATRICS/WEIGHT MGMT | Age: 48
End: 2018-09-04
Payer: MEDICARE

## 2018-09-04 VITALS
WEIGHT: 293 LBS | DIASTOLIC BLOOD PRESSURE: 74 MMHG | BODY MASS INDEX: 48.82 KG/M2 | HEART RATE: 78 BPM | HEIGHT: 65 IN | SYSTOLIC BLOOD PRESSURE: 126 MMHG

## 2018-09-04 DIAGNOSIS — M25.562 BILATERAL CHRONIC KNEE PAIN: ICD-10-CM

## 2018-09-04 DIAGNOSIS — M19.90 ARTHRITIS: ICD-10-CM

## 2018-09-04 DIAGNOSIS — R73.03 PREDIABETES: ICD-10-CM

## 2018-09-04 DIAGNOSIS — M54.9 CHRONIC BACK PAIN, UNSPECIFIED BACK LOCATION, UNSPECIFIED BACK PAIN LATERALITY: ICD-10-CM

## 2018-09-04 DIAGNOSIS — E66.01 MORBID OBESITY WITH BMI OF 50.0-59.9, ADULT (HCC): ICD-10-CM

## 2018-09-04 DIAGNOSIS — E78.5 HYPERLIPIDEMIA, UNSPECIFIED HYPERLIPIDEMIA TYPE: ICD-10-CM

## 2018-09-04 DIAGNOSIS — I10 ESSENTIAL HYPERTENSION: ICD-10-CM

## 2018-09-04 DIAGNOSIS — G89.29 CHRONIC BACK PAIN, UNSPECIFIED BACK LOCATION, UNSPECIFIED BACK PAIN LATERALITY: ICD-10-CM

## 2018-09-04 DIAGNOSIS — M25.561 BILATERAL CHRONIC KNEE PAIN: ICD-10-CM

## 2018-09-04 DIAGNOSIS — F20.9 SCHIZOPHRENIA, UNSPECIFIED TYPE (HCC): ICD-10-CM

## 2018-09-04 DIAGNOSIS — G89.29 BILATERAL CHRONIC KNEE PAIN: ICD-10-CM

## 2018-09-04 DIAGNOSIS — E11.9 DIABETES MELLITUS WITHOUT COMPLICATION (HCC): Primary | ICD-10-CM

## 2018-09-04 DIAGNOSIS — E28.2 PCOS (POLYCYSTIC OVARIAN SYNDROME): ICD-10-CM

## 2018-09-04 PROCEDURE — 99213 OFFICE O/P EST LOW 20 MIN: CPT | Performed by: NURSE PRACTITIONER

## 2018-09-04 NOTE — PROGRESS NOTES
Group Lifestyle Balance Follow-up Progress Note      Subjective     Patient is here for group medical appointment for Group Lifestyle Balance weight management program follow-up for the chronic conditions of HTN, HLD, Chronic Back Pain, Schizophrenia and Depression (has followed with [address] [facility] [facility] [place]. since 1992), Prediabetes, Arthritis, Chronic Bilateral Knee Pain  Patient continues on the program and tolerating well. Total weight loss of 10 lbs. No current issues. Allergies: Allergies   Allergen Reactions    Lisinopril      Makes her cough       Past Medical History:     Past Medical History:   Diagnosis Date    Asthma     Astigmatism, regular 4/12/2017    Bronchitis 2009    Chronic back pain     Depression 5/1/2014    Diabetes mellitus ([address]     Type II    Frequency of urination     Hyperlipidemia     Hypertension     Increased frequency of urination 8/27/2018    Myopia 4/12/2017    OAG (open angle glaucoma) suspect, low risk 5/4/2017    Obesity     Palpitations     occasional    Schizophrenia ([address]     Sleep apnea since Oct. 2017    CPAP nightly    Urge incontinence of urine 8/27/2018    Wears glasses    .     Past Surgical History:  Past Surgical History:   Procedure Laterality Date    DILATION AND CURETTAGE OF UTERUS N/A 11/14/2017    OPERATIVE HYSTEROSCOPY WITH Geovanni Orozco, EXCISION ENDOMETRIAL POLYPS performed by Dev Alarcon MD at [address] [place] [address] Drive HYSTEROSCOPY  11/14/2017    exc endometrial polyps    OTHER SURGICAL HISTORY Right 10/17/14    excision dorsal exostosis rt foot       Family History:  Family History   Problem Relation Age of Onset    Diabetes Mother     Heart Failure Mother     Diabetes Maternal Grandmother     Heart Failure Maternal Grandmother     No Known Problems Maternal Grandfather     No Known Problems Paternal Grandmother     No Known Problems Paternal Grandfather     Cancer Paternal Aunt        Social History:  Social History     Social History    Marital status: Single     Spouse name: N/A    Number of children: 0    Years of education: N/A     Occupational History    Not on file. Social History Main Topics    Smoking status: Passive Smoke Exposure - Never Smoker    Smokeless tobacco: Never Used      Comment: associates smoking    Alcohol use No    Drug use: No    Sexual activity: No     Other Topics Concern    Not on file     Social History Narrative    No narrative on file       Current Medications:  Current Outpatient Prescriptions   Medication Sig Dispense Refill    medroxyPROGESTERone (PROVERA) 10 MG tablet Take 1 tablet by mouth daily for 10 days 1 tablet daily for the first 10 days of each month. 10 tablet 5    fluticasone (FLOVENT HFA) 110 MCG/ACT inhaler Inhale 1 puff into the lungs 2 times daily 1 Inhaler 11    albuterol sulfate  (90 Base) MCG/ACT inhaler Inhale 2 puffs into the lungs every 6 hours as needed for Wheezing 1 Inhaler 3    ibuprofen (ADVIL;MOTRIN) 800 MG tablet Take 1 tablet by mouth every 6 hours as needed for Pain 30 tablet 0    ondansetron (ZOFRAN-ODT) 4 MG disintegrating tablet Take 1 tablet by mouth every 8 hours as needed for Nausea or Vomiting 6 tablet 0    DICLOFENAC POTASSIUM PO Take 50 mg by mouth 3 times daily as needed      ARIPiprazole (ABILIFY) 15 MG tablet Take 15 mg by mouth nightly       traZODone (DESYREL) 50 MG tablet Take 50 mg by mouth nightly      pravastatin (PRAVACHOL) 80 MG tablet Take 80 mg by mouth nightly       oxybutynin (DITROPAN-XL) 10 MG extended release tablet Take 10 mg by mouth daily      gabapentin (NEURONTIN) 100 MG capsule Take 100 mg by mouth 3 times daily.  potassium chloride (MICRO-K) 10 MEQ CR capsule Take 10 mEq by mouth daily.  valsartan (DIOVAN) 40 MG tablet Take 40 mg by mouth nightly Patient takes at night      metFORMIN (GLUCOPHAGE) 500 MG tablet Take 500 mg by mouth daily (with breakfast).       hydrochlorothiazide (MICROZIDE) 12.5 MG capsule Take 12.5

## 2018-10-09 ENCOUNTER — NURSE ONLY (OUTPATIENT)
Dept: BARIATRICS/WEIGHT MGMT | Age: 48
End: 2018-10-09
Payer: MEDICARE

## 2018-10-09 VITALS
SYSTOLIC BLOOD PRESSURE: 128 MMHG | HEIGHT: 65 IN | HEART RATE: 76 BPM | WEIGHT: 293 LBS | BODY MASS INDEX: 48.82 KG/M2 | DIASTOLIC BLOOD PRESSURE: 72 MMHG

## 2018-10-09 DIAGNOSIS — I10 ESSENTIAL HYPERTENSION: Primary | ICD-10-CM

## 2018-10-09 DIAGNOSIS — E28.2 PCOS (POLYCYSTIC OVARIAN SYNDROME): ICD-10-CM

## 2018-10-09 DIAGNOSIS — M19.90 ARTHRITIS: ICD-10-CM

## 2018-10-09 DIAGNOSIS — G89.29 CHRONIC BACK PAIN, UNSPECIFIED BACK LOCATION, UNSPECIFIED BACK PAIN LATERALITY: ICD-10-CM

## 2018-10-09 DIAGNOSIS — E78.5 HYPERLIPIDEMIA, UNSPECIFIED HYPERLIPIDEMIA TYPE: ICD-10-CM

## 2018-10-09 DIAGNOSIS — M25.562 BILATERAL CHRONIC KNEE PAIN: ICD-10-CM

## 2018-10-09 DIAGNOSIS — N39.41 URGE INCONTINENCE OF URINE: ICD-10-CM

## 2018-10-09 DIAGNOSIS — M54.9 CHRONIC BACK PAIN, UNSPECIFIED BACK LOCATION, UNSPECIFIED BACK PAIN LATERALITY: ICD-10-CM

## 2018-10-09 DIAGNOSIS — G89.29 BILATERAL CHRONIC KNEE PAIN: ICD-10-CM

## 2018-10-09 DIAGNOSIS — M25.561 BILATERAL CHRONIC KNEE PAIN: ICD-10-CM

## 2018-10-09 DIAGNOSIS — E66.01 MORBID OBESITY WITH BMI OF 50.0-59.9, ADULT (HCC): ICD-10-CM

## 2018-10-09 DIAGNOSIS — R73.03 PREDIABETES: ICD-10-CM

## 2018-10-09 PROCEDURE — 99213 OFFICE O/P EST LOW 20 MIN: CPT | Performed by: NURSE PRACTITIONER

## 2019-02-28 ENCOUNTER — INITIAL CONSULT (OUTPATIENT)
Dept: PAIN MANAGEMENT | Age: 49
End: 2019-02-28
Payer: COMMERCIAL

## 2019-02-28 VITALS
DIASTOLIC BLOOD PRESSURE: 95 MMHG | HEART RATE: 80 BPM | BODY MASS INDEX: 48.82 KG/M2 | HEIGHT: 65 IN | OXYGEN SATURATION: 96 % | SYSTOLIC BLOOD PRESSURE: 142 MMHG | WEIGHT: 293 LBS

## 2019-02-28 DIAGNOSIS — E66.01 MORBID OBESITY WITH BMI OF 50.0-59.9, ADULT (HCC): ICD-10-CM

## 2019-02-28 DIAGNOSIS — Z78.9 POOR HISTORIAN: ICD-10-CM

## 2019-02-28 DIAGNOSIS — M17.0 PRIMARY OSTEOARTHRITIS OF BOTH KNEES: Primary | ICD-10-CM

## 2019-02-28 PROCEDURE — G8427 DOCREV CUR MEDS BY ELIG CLIN: HCPCS | Performed by: ANESTHESIOLOGY

## 2019-02-28 PROCEDURE — G8417 CALC BMI ABV UP PARAM F/U: HCPCS | Performed by: ANESTHESIOLOGY

## 2019-02-28 PROCEDURE — 1036F TOBACCO NON-USER: CPT | Performed by: ANESTHESIOLOGY

## 2019-02-28 PROCEDURE — G8484 FLU IMMUNIZE NO ADMIN: HCPCS | Performed by: ANESTHESIOLOGY

## 2019-02-28 PROCEDURE — 99204 OFFICE O/P NEW MOD 45 MIN: CPT | Performed by: ANESTHESIOLOGY

## 2019-02-28 RX ORDER — GABAPENTIN 300 MG/1
1 CAPSULE ORAL
COMMUNITY

## 2019-02-28 ASSESSMENT — ENCOUNTER SYMPTOMS
COUGH: 1
BACK PAIN: 1
WHEEZING: 0
CHEST TIGHTNESS: 0
GASTROINTESTINAL NEGATIVE: 1
EYES NEGATIVE: 1
CHOKING: 0
APNEA: 0
ALLERGIC/IMMUNOLOGIC NEGATIVE: 1
SHORTNESS OF BREATH: 1
STRIDOR: 0

## 2019-03-05 ENCOUNTER — TELEPHONE (OUTPATIENT)
Dept: PAIN MANAGEMENT | Age: 49
End: 2019-03-05

## 2019-03-07 DIAGNOSIS — M17.0 PRIMARY OSTEOARTHRITIS OF BOTH KNEES: ICD-10-CM

## 2019-03-09 ENCOUNTER — HOSPITAL ENCOUNTER (OUTPATIENT)
Age: 49
Discharge: HOME | End: 2019-03-11
Payer: COMMERCIAL

## 2019-03-09 ENCOUNTER — HOSPITAL ENCOUNTER (OUTPATIENT)
Dept: GENERAL RADIOLOGY | Age: 49
Discharge: HOME | End: 2019-03-11
Payer: COMMERCIAL

## 2019-03-09 DIAGNOSIS — M17.0 ARTHRITIS OF BOTH KNEES: ICD-10-CM

## 2019-03-09 PROCEDURE — 73562 X-RAY EXAM OF KNEE 3: CPT

## 2019-03-13 ENCOUNTER — OFFICE VISIT (OUTPATIENT)
Dept: PULMONOLOGY | Age: 49
End: 2019-03-13
Payer: COMMERCIAL

## 2019-03-13 VITALS
SYSTOLIC BLOOD PRESSURE: 132 MMHG | DIASTOLIC BLOOD PRESSURE: 79 MMHG | BODY MASS INDEX: 48.82 KG/M2 | OXYGEN SATURATION: 98 % | HEIGHT: 65 IN | HEART RATE: 84 BPM | RESPIRATION RATE: 12 BRPM | WEIGHT: 293 LBS

## 2019-03-13 DIAGNOSIS — J45.20 MILD INTERMITTENT ASTHMA WITHOUT COMPLICATION: ICD-10-CM

## 2019-03-13 DIAGNOSIS — G47.33 OBSTRUCTIVE SLEEP APNEA: Primary | ICD-10-CM

## 2019-03-13 DIAGNOSIS — E66.01 MORBID OBESITY WITH BMI OF 50.0-59.9, ADULT (HCC): ICD-10-CM

## 2019-03-13 PROCEDURE — G8427 DOCREV CUR MEDS BY ELIG CLIN: HCPCS | Performed by: INTERNAL MEDICINE

## 2019-03-13 PROCEDURE — G8417 CALC BMI ABV UP PARAM F/U: HCPCS | Performed by: INTERNAL MEDICINE

## 2019-03-13 PROCEDURE — G8484 FLU IMMUNIZE NO ADMIN: HCPCS | Performed by: INTERNAL MEDICINE

## 2019-03-13 PROCEDURE — 1036F TOBACCO NON-USER: CPT | Performed by: INTERNAL MEDICINE

## 2019-03-13 PROCEDURE — 99214 OFFICE O/P EST MOD 30 MIN: CPT | Performed by: INTERNAL MEDICINE

## 2019-03-13 ASSESSMENT — SLEEP AND FATIGUE QUESTIONNAIRES
HOW LIKELY ARE YOU TO NOD OFF OR FALL ASLEEP IN A CAR, WHILE STOPPED FOR A FEW MINUTES IN TRAFFIC: 0
HOW LIKELY ARE YOU TO NOD OFF OR FALL ASLEEP WHEN YOU ARE A PASSENGER IN A CAR FOR AN HOUR WITHOUT A BREAK: 0
HOW LIKELY ARE YOU TO NOD OFF OR FALL ASLEEP WHILE SITTING AND READING: 3
HOW LIKELY ARE YOU TO NOD OFF OR FALL ASLEEP WHILE SITTING AND TALKING TO SOMEONE: 0
ESS TOTAL SCORE: 10
HOW LIKELY ARE YOU TO NOD OFF OR FALL ASLEEP WHILE WATCHING TV: 3
HOW LIKELY ARE YOU TO NOD OFF OR FALL ASLEEP WHILE SITTING INACTIVE IN A PUBLIC PLACE: 1
HOW LIKELY ARE YOU TO NOD OFF OR FALL ASLEEP WHILE LYING DOWN TO REST IN THE AFTERNOON WHEN CIRCUMSTANCES PERMIT: 2
HOW LIKELY ARE YOU TO NOD OFF OR FALL ASLEEP WHILE SITTING QUIETLY AFTER LUNCH WITHOUT ALCOHOL: 1

## 2019-03-19 ENCOUNTER — HOSPITAL ENCOUNTER (OUTPATIENT)
Dept: PHYSICAL THERAPY | Facility: CLINIC | Age: 49
Setting detail: THERAPIES SERIES
Discharge: HOME | End: 2019-03-19
Payer: COMMERCIAL

## 2019-03-19 PROCEDURE — 97161 PT EVAL LOW COMPLEX 20 MIN: CPT

## 2019-03-19 PROCEDURE — 97110 THERAPEUTIC EXERCISES: CPT

## 2019-03-22 ENCOUNTER — HOSPITAL ENCOUNTER (OUTPATIENT)
Dept: PHYSICAL THERAPY | Facility: CLINIC | Age: 49
Setting detail: THERAPIES SERIES
Discharge: HOME | End: 2019-03-22
Payer: COMMERCIAL

## 2019-03-22 PROCEDURE — 97110 THERAPEUTIC EXERCISES: CPT

## 2019-03-25 ENCOUNTER — APPOINTMENT (OUTPATIENT)
Dept: GENERAL RADIOLOGY | Age: 49
End: 2019-03-25
Attending: ANESTHESIOLOGY
Payer: COMMERCIAL

## 2019-03-25 ENCOUNTER — HOSPITAL ENCOUNTER (OUTPATIENT)
Age: 49
Setting detail: OUTPATIENT SURGERY
Discharge: HOME | End: 2019-03-25
Attending: ANESTHESIOLOGY | Admitting: ANESTHESIOLOGY
Payer: COMMERCIAL

## 2019-03-25 VITALS
HEART RATE: 81 BPM | DIASTOLIC BLOOD PRESSURE: 79 MMHG | HEIGHT: 65 IN | OXYGEN SATURATION: 99 % | SYSTOLIC BLOOD PRESSURE: 126 MMHG | WEIGHT: 293 LBS | RESPIRATION RATE: 19 BRPM | BODY MASS INDEX: 48.82 KG/M2 | TEMPERATURE: 97.5 F

## 2019-03-25 PROBLEM — M17.0 PRIMARY OSTEOARTHRITIS OF BOTH KNEES: Chronic | Status: ACTIVE | Noted: 2019-03-25

## 2019-03-25 LAB — GLUCOSE BLD-MCNC: 67 MG/DL (ref 65–105)

## 2019-03-25 PROCEDURE — 6360000004 HC RX CONTRAST MEDICATION: Performed by: ANESTHESIOLOGY

## 2019-03-25 PROCEDURE — 82947 ASSAY GLUCOSE BLOOD QUANT: CPT

## 2019-03-25 PROCEDURE — 20610 DRAIN/INJ JOINT/BURSA W/O US: CPT | Performed by: ANESTHESIOLOGY

## 2019-03-25 PROCEDURE — 3600000050 HC PAIN LEVEL 1 BASE: Performed by: ANESTHESIOLOGY

## 2019-03-25 PROCEDURE — 3209999900 FLUORO FOR SURGICAL PROCEDURES

## 2019-03-25 PROCEDURE — 77002 NEEDLE LOCALIZATION BY XRAY: CPT | Performed by: ANESTHESIOLOGY

## 2019-03-25 PROCEDURE — 7100000010 HC PHASE II RECOVERY - FIRST 15 MIN: Performed by: ANESTHESIOLOGY

## 2019-03-25 PROCEDURE — 2709999900 HC NON-CHARGEABLE SUPPLY: Performed by: ANESTHESIOLOGY

## 2019-03-25 PROCEDURE — 6360000002 HC RX W HCPCS: Performed by: ANESTHESIOLOGY

## 2019-03-25 PROCEDURE — 2500000003 HC RX 250 WO HCPCS: Performed by: ANESTHESIOLOGY

## 2019-03-25 PROCEDURE — 7100000011 HC PHASE II RECOVERY - ADDTL 15 MIN: Performed by: ANESTHESIOLOGY

## 2019-03-25 RX ORDER — SODIUM CHLORIDE 0.9 % (FLUSH) 0.9 %
10 SYRINGE (ML) INJECTION EVERY 12 HOURS SCHEDULED
Status: DISCONTINUED | OUTPATIENT
Start: 2019-03-25 | End: 2019-03-25 | Stop reason: HOSPADM

## 2019-03-25 RX ORDER — DEXAMETHASONE SODIUM PHOSPHATE 10 MG/ML
INJECTION INTRAMUSCULAR; INTRAVENOUS PRN
Status: DISCONTINUED | OUTPATIENT
Start: 2019-03-25 | End: 2019-03-25 | Stop reason: ALTCHOICE

## 2019-03-25 RX ORDER — SODIUM CHLORIDE 0.9 % (FLUSH) 0.9 %
10 SYRINGE (ML) INJECTION PRN
Status: DISCONTINUED | OUTPATIENT
Start: 2019-03-25 | End: 2019-03-25 | Stop reason: HOSPADM

## 2019-03-25 RX ORDER — BUPIVACAINE HYDROCHLORIDE 5 MG/ML
INJECTION, SOLUTION EPIDURAL; INTRACAUDAL PRN
Status: DISCONTINUED | OUTPATIENT
Start: 2019-03-25 | End: 2019-03-25 | Stop reason: ALTCHOICE

## 2019-03-25 ASSESSMENT — PAIN SCALES - GENERAL
PAINLEVEL_OUTOF10: 0
PAINLEVEL_OUTOF10: 0

## 2019-03-25 ASSESSMENT — PAIN - FUNCTIONAL ASSESSMENT: PAIN_FUNCTIONAL_ASSESSMENT: 0-10

## 2019-03-26 ENCOUNTER — HOSPITAL ENCOUNTER (OUTPATIENT)
Dept: PHYSICAL THERAPY | Facility: CLINIC | Age: 49
Setting detail: THERAPIES SERIES
Discharge: HOME | End: 2019-03-26
Payer: COMMERCIAL

## 2019-03-26 DIAGNOSIS — M17.0 PRIMARY OSTEOARTHRITIS OF BOTH KNEES: Primary | ICD-10-CM

## 2019-03-26 PROCEDURE — 97110 THERAPEUTIC EXERCISES: CPT

## 2019-03-29 ENCOUNTER — HOSPITAL ENCOUNTER (OUTPATIENT)
Dept: PHYSICAL THERAPY | Facility: CLINIC | Age: 49
Setting detail: THERAPIES SERIES
Discharge: HOME | End: 2019-03-29
Payer: COMMERCIAL

## 2019-03-29 PROCEDURE — 97110 THERAPEUTIC EXERCISES: CPT

## 2019-04-02 ENCOUNTER — HOSPITAL ENCOUNTER (OUTPATIENT)
Dept: PHYSICAL THERAPY | Facility: CLINIC | Age: 49
Setting detail: THERAPIES SERIES
Discharge: HOME | End: 2019-04-02
Payer: COMMERCIAL

## 2019-04-02 PROCEDURE — 97110 THERAPEUTIC EXERCISES: CPT

## 2019-04-02 NOTE — FLOWSHEET NOTE
[] [facility]) [facility]  [address]  [phone]  F: [phone] [x] [address]  [facility]   [address]  P: [phone]  F: [phone] [] [place]  [address]  P: [phone]  F: [phone] [] [address]  [facility]   [address]  [place]: [phone]  F: [phone]     Physical Therapy Daily Treatment Note    Date:  2019  Patient Name:  Nichole Lynch    :  1970  MRN: [id number]  Physician: Ruben Mera MD                        Insurance: Ananth Barfield required after 12 visits   Medical Diagnosis: M17.0 (ICD-10-CM) - Primary osteoarthritis of both knees                   Rehab Codes: M25.561, M25.562, M25.661, M25.662, R26.2, M62.81  Onset date:                    Next 's appt. : 19  Visit# / total visits:   Cancels/No Shows: 0/0    Subjective:    Pain:  [x] Yes  [] No Location: B knees Pain Rating: (0-10 scale) \"pretty low\" /10  Pain altered Tx:  [x] No  [] Yes  Action:  Comments: Pt ambulates into clinic with increased antalgic gait however notes that pain is \"pretty low\". No new complaints.      Objective:  Modalities: gel packs to B knees in sitting x 10 minutes   Precautions: HTN, type 2 diabetes, poor historian   Exercises:  Exercise Reps/ Time Weight/ Level Comments   scifit 5 min  Self paced         STANDING      3 way hip 15x ea 1# Increased 4/2   Heel raises  20x     Hamstring curls 20x 1# Increased 4/2   NBOS  3 x 30\"  1 set completed on foam    STS  2 x 5  Wide chair, Increased 4/2   Step ups 10x 4\"    Lateral walking 2 x 30\"  Cues to stay facing mirror          SUPINE         Heel slides 15x2 ea    Orange slider, therapist applied over pressure last 5x    Bridges 10x  Cannot clear table   SLR 2 x 10 1#    Victor Parkinson lying clamshells  20x red              SEATED         Hip add 15 x 5\"   Ball/pillow squeeze   LAQ 15x2 ea 2#  Increased 4/2   Marches  15x ea 2# Increased 4/2   HS curl 20x ea  red    HS stretch 3 x 20\"   LE fully extended, hip hinge  Foot on stool   Other:  Gait training with SPC in left hand to offload more painful R knee, 2 point gait pattern x 5 minutes.      Specific Instructions for next treatment: B knee ROM and strengthening (begin OKC and progress to CKC as tolerated), gait training with least restrictive AD (likely will be RW d/t B knee pain), modalities as needed for pain modulation.         Treatment Charges: Mins Units   []  Modalities: CP 10 0   [x]  Ther Exercise 48 3   []  Manual Therapy     []  Ther Activities     []  Aquatics     []  Vasocompression     [x]  Other: gait train 5  0   Total Treatment time 48 3       Assessment: [x] Progressing toward goals. Progressed exercise log as indicated above, pt requires 2 therapeutic rest breaks throughout session. Pt with poor carry over of exercise technique. Pt had difficulty following commands for 2 point gait pattern with cane, demo's poor coordination. Pt was asked to bring in walker next session for gait training if possible. [] No change. [] Other:    STG: (to be met in 8 treatments)  1. ? Pain: Pt will report <8/10 B knee pain in weightbearing positions to improve tolerance to standing and ambulation   2. ? ROM:  a. Pt will improve B knee flexion AROM by 3-5 ° for improved mobility in and out of cars   b. Pt will improve B knee extension AROM to 0 ° for improved gait mechanics   3. ? Strength: pt will improve gross BLE strength by 1/2 grade for improved ability to navigate curbs, steps, etc.  4. ? Function:  a. Pt will report ability to stand for 5 minutes without increases in B knee pain for improved ability to  line at grocery store  b.  Pt will improve LEFI score by 3-5 points to indicate improved functional mobility 5. Independent with Home Exercise Programs  6. Demonstrate Knowledge of fall prevention  LTG: (to be met in 14 treatments)  1. Pt will report 6/10 or lower B knee pain in weightbearing positions for improved QOL  2. Pt will improve B knee flexion AROM to 95 ° or greater for improved ability to put on shoes  3. Pt will improve LEFI score to  >25/80 to indicate improved functional mobility   4. Pt will improve TUG score by 2 seconds in order to reduce risk of falls  5. Pt will improve BLE strength to the following for improved ability to complete functional tasks:  a. Hip flexion to 4-/5   b. Knee flexion to 4+/5  c. Plantarflexion to 4/5  6. Pt will report ability to ambulate in grocery store with least restrictive assistive device for 5 minutes without increased B knee pain for improved community mobility                 Patient goals: lose weight, strengthen knees         Pt. Education:  [x] Yes  [] No  [x] Reviewed Prior HEP/Ed  Method of Education: [x] Verbal  [] Demo  [x] Written  3/22  Provided fall prevention hand out and reviewed prior HEP given. Comprehension of Education:  [x] Verbalizes understanding. [x] Demonstrates understanding. [x] Needs review. [] Demonstrates/verbalizes HEP/Ed previously given. Plan: [x] Continue per plan of care.    [] Other:      Time In: 8:00 am          Time Out: 9:00 am    Electronically signed by:  Cindee Closs, PT

## 2019-04-05 ENCOUNTER — HOSPITAL ENCOUNTER (OUTPATIENT)
Dept: PHYSICAL THERAPY | Facility: CLINIC | Age: 49
Setting detail: THERAPIES SERIES
Discharge: HOME | End: 2019-04-05
Payer: COMMERCIAL

## 2019-04-05 PROCEDURE — 97110 THERAPEUTIC EXERCISES: CPT

## 2019-04-05 PROCEDURE — 97116 GAIT TRAINING THERAPY: CPT

## 2019-04-05 NOTE — FLOWSHEET NOTE
STANDING      3 way hip 10x ea 1# Increased 4/2; inc UE A and trunk A on // bars   Heel raises  20x  B UE A; inc trunk flexion   Hamstring curls 20x 1# Increased 4/2; ALT LEs; cues for coordination   NBOS  2 x 20\"  On foam; arms crossed; SBA PT   STS  2 x 5  Wide chair, Increased 4/2; limited forward translation   Step ups x30\" 4\" Modified to alt toe taps to step d/t excessive UE A and lack of coordination    Lateral walking 2 x 30\"  TCs for dec B trunk sway; neutral rotation to mirror; veers toward mirror each direction         SUPINE         Heel slides 20x ea   Orange slider, therapist applied over pressure last 5x on ea; TCs for dec toe-out   Bridges 10x - Cannot clear table   SLR 20x ea 1#    Hook lying clamshells  20x red              SEATED         Hip add 20 x 5\"   Ball   LAQ 20x ea 2# Increased 4/2; ALT LEs   Marches  20x ea 2# Increased 4/2; ALT LEs; attempted hands clasped   HS curl 20x ea  red    HS stretch 2 x 20\" ea   LE fully extended, hip hinge, foot on stool   Other:  Attempted 4\" step-ups- held this date- d/t excessive UE A, poor coordination, and poor quad control- modified to performing alt toe taps to step. Sig difficulty coordinating sit<>stand- demos B knee hyperextension compensations and poor B quad control to return to sitting- unable to self-correct with VCs. Requires cues for inc forward gaze through all interventions in order to inc proprioception and prevent excessive trunk flexion. x1 rest break post alt toe taps to step. Second rest break post standing interventions in // bars.     Specific Instructions for next treatment: Consider tandem stance balance; // bar squats; and retro ambulation next visit.      B knee ROM and strengthening (begin OKC and progress to CKC as tolerated), gait training with least restrictive AD (likely will be RW d/t B knee pain), modalities as needed for pain modulation.       Treatment Charges: Mins Units   []  Modalities: CP 10 0   [x]  Ther Exercise 28 2   []  Manual Therapy     []  Ther Activities     []  Aquatics     []  Vasocompression     [x]  Other: gait train  10 1   Total Treatment time 48 3     [x5' on nu-step]    Assessment: [x] Progressing toward goals. Pt presents with no complaints of pain, only muscle soreness R> L, and reports good tolerance to last visit. However, ambulates into clinic without AD and continued gait deviations. Therefore, continued gait training with SPC, as well as lateral ambulation- continues to demo B lateral trunk sway compensations and poor coordination. Would benefit from further gait training once obtains new FWW. Also reviewed previous interventions secondary to pt's status as poor historian- requires cues of all types in order to achieve appropriate form with each intervention. Frequently demos excessive trunk flexion, inc UE A, and dec forward gaze. Denies inc pain at finish. Finished with CP to both knees in sitting- wide chair- for control of muscle soreness. Consider ideas above and below next visit. [] No change. [] Other:    STG: (to be met in 8 treatments)  1. ? Pain: Pt will report <8/10 B knee pain in weightbearing positions to improve tolerance to standing and ambulation   2. ? ROM:  a. Pt will improve B knee flexion AROM by 3-5 ° for improved mobility in and out of cars   b. Pt will improve B knee extension AROM to 0 ° for improved gait mechanics   3. ? Strength: pt will improve gross BLE strength by 1/2 grade for improved ability to navigate curbs, steps, etc.  4. ? Function:  a. Pt will report ability to stand for 5 minutes without increases in B knee pain for improved ability to  line at grocery store  b. Pt will improve LEFI score by 3-5 points to indicate improved functional mobility   5. Independent with Home Exercise Programs  6. Demonstrate Knowledge of fall prevention  LTG: (to be met in 14 treatments)  1.  Pt will report 6/10 or lower B knee pain in weightbearing positions for improved QOL  2. Pt will improve B knee flexion AROM to 95 ° or greater for improved ability to put on shoes  3. Pt will improve LEFI score to  >25/80 to indicate improved functional mobility   4. Pt will improve TUG score by 2 seconds in order to reduce risk of falls  5. Pt will improve BLE strength to the following for improved ability to complete functional tasks:  a. Hip flexion to 4-/5   b. Knee flexion to 4+/5  c. Plantarflexion to 4/5  6. Pt will report ability to ambulate in grocery store with least restrictive assistive device for 5 minutes without increased B knee pain for improved community mobility                 Patient goals: lose weight, strengthen knees     Pt. Education:  [x] Yes  [] No  [] Reviewed Prior HEP/Ed  Method of Education: [x] Verbal  [] Demo  [] Written  3/22  Provided fall prevention hand out and reviewed prior HEP given. Comprehension of Education:  [x] Verbalizes understanding. [] Demonstrates understanding. [] Needs review. [] Demonstrates/verbalizes HEP/Ed previously given. Educated regarding rationale for forward gaze during all interventions in order to dec trunk flexion, protect lumbar spine, and inc B knee proprioception- verbalized understanding. Plan: [x] Continue per plan of care. [x] Other: Consider tandem stance balance; // bar squats; and retro ambulation next visit.        Time In: 8:02AM          Time Out: 8:55AM    Electronically signed by:  Brittney Montero PT

## 2019-04-09 ENCOUNTER — HOSPITAL ENCOUNTER (OUTPATIENT)
Dept: PHYSICAL THERAPY | Facility: CLINIC | Age: 49
Setting detail: THERAPIES SERIES
Discharge: HOME | End: 2019-04-09
Payer: COMMERCIAL

## 2019-04-09 PROCEDURE — 97110 THERAPEUTIC EXERCISES: CPT

## 2019-04-09 NOTE — FLOWSHEET NOTE
curls 20x 1# Increased 4/2; ALT LEs; cues for coordination   NBOS  2 x 20\"  On foam; arms crossed; SBA PT   Semi Tandem stance 2 x 20\"  Arms crossed, SBA PT.    STS  2 x 5  Wide chair   Alt toe taps  x30\" 4\"    Lateral walking 2 x 30\"  TCs for dec B trunk sway; neutral rotation to mirror; veers toward mirror each direction         SUPINE         Heel slides 20x ea   Orange slider, therapist applied over pressure last 5x on ea; TCs for dec toe-out   Bridges 15x - Cannot clear table, increased reps 4/9   SLR 20x ea 1#    Hook lying clamshells  25x red              SEATED         Hip add 20 x 5\"   Ball   LAQ 20x ea 2# Increased 4/2; ALT LEs   Marches  20x ea 2# Increased 4/2; ALT LEs; attempted hands clasped   HS curl 20x ea  red    HS stretch 2 x 20\" ea   LE fully extended, hip hinge, foot on stool   Other:      x1 rest break post alt toe taps to step  Second rest break post standing interventions in // bars.     Specific Instructions for next treatment: Consider tandem stance balance; // bar squats; and retro ambulation next visit. B knee ROM and strengthening (begin OKC and progress to CKC as tolerated), gait training with least restrictive AD (likely will be RW d/t B knee pain), modalities as needed for pain modulation.       Treatment Charges: Mins Units   []  Modalities: CP     [x]  Ther Exercise 47 3   []  Manual Therapy     []  Ther Activities     []  Aquatics     []  Vasocompression     []  Other: gait train     Total Treatment time 47 3       Assessment: [x] Progressing toward goals. Requires verbal and tactile cueing throughout all interventions this date for appropriate form. Initiated tandem stance this date, pt has difficulty obtaining true semi tandem stance d/t desire to stand with toes pointed out. [] No change.      [] Other:    STG: (to be met in 8 treatments)  1. ? Pain: Pt will report <8/10 B knee pain in weightbearing positions to improve tolerance to standing and ambulation   2. ? ROM:  a. Pt will improve B knee flexion AROM by 3-5 ° for improved mobility in and out of cars   b. Pt will improve B knee extension AROM to 0 ° for improved gait mechanics   3. ? Strength: pt will improve gross BLE strength by 1/2 grade for improved ability to navigate curbs, steps, etc.  4. ? Function:  a. Pt will report ability to stand for 5 minutes without increases in B knee pain for improved ability to  line at grocery store  b. Pt will improve LEFI score by 3-5 points to indicate improved functional mobility   5. Independent with Home Exercise Programs  6. Demonstrate Knowledge of fall prevention  LTG: (to be met in 14 treatments)  1. Pt will report 6/10 or lower B knee pain in weightbearing positions for improved QOL  2. Pt will improve B knee flexion AROM to 95 ° or greater for improved ability to put on shoes  3. Pt will improve LEFI score to  >25/80 to indicate improved functional mobility   4. Pt will improve TUG score by 2 seconds in order to reduce risk of falls  5. Pt will improve BLE strength to the following for improved ability to complete functional tasks:  a. Hip flexion to 4-/5   b. Knee flexion to 4+/5  c. Plantarflexion to 4/5  6. Pt will report ability to ambulate in grocery store with least restrictive assistive device for 5 minutes without increased B knee pain for improved community mobility                 Patient goals: lose weight, strengthen knees     Pt. Education:  [x] Yes  [] No  [] Reviewed Prior HEP/Ed  Method of Education: [x] Verbal  [] Demo  [] Written  3/22  Provided fall prevention hand out and reviewed prior HEP given. Comprehension of Education:  [x] Verbalizes understanding. [] Demonstrates understanding. [] Needs review. [] Demonstrates/verbalizes HEP/Ed previously given. Educated regarding rationale for forward gaze during all interventions in order to dec trunk flexion, protect lumbar spine, and inc B knee proprioception- verbalized understanding. Plan: [x] Continue per plan of care. [x] Other: Consider tandem stance balance; // bar squats; and retro ambulation next visit.        Time In: 8:00AM          Time Out: 8:55AM    Electronically signed by:  Zohreh Ang PT

## 2019-04-12 ENCOUNTER — HOSPITAL ENCOUNTER (OUTPATIENT)
Dept: PHYSICAL THERAPY | Facility: CLINIC | Age: 49
Setting detail: THERAPIES SERIES
Discharge: HOME | End: 2019-04-12
Payer: COMMERCIAL

## 2019-04-12 PROCEDURE — 97110 THERAPEUTIC EXERCISES: CPT

## 2019-04-12 NOTE — FLOWSHEET NOTE
[] [facility]) - [facility] &  Therapy  [address]  [phone]  F: [phone] [x] [address]  [facility] [address]   [address]  P: [phone]  F: [phone] [] [address]  [address] [facility]  P: [phone]  F: [phone] [] [address]  [facility]   [address]  [place]: [phone]  F: [phone]     Physical Therapy Daily Treatment Note    Date:  2019  Patient Name:  Brady Jacob    :  1970  MRN: [id number]  Physician: Wang Zamudio MD                        Insurance: [place]oult[employer] Paci required after 12 visits   Medical Diagnosis: M17.0 (ICD-10-CM) - Primary osteoarthritis of both knees                   Rehab Codes: M25.561, M25.562, M25.661, M25.662, R26.2, M62.81  Onset date:                    Next Dr's appt. : 19  Visit# / total visits:   Cancels/No Shows: 0/0    Subjective:    Pain:  [x] Yes  [] No Location: B knees- R > L Pain Rating: (0-10 scale) \"sore\"   Pain altered Tx:  [x] No  [] Yes  Action:  Comments: Pt states that overall she's doing well, reports slight soreness in B knees.      Ambulates into clinic without AD and sig B lateral trunk sway, wide NICKI, inc toe-out B.     Objective:  Modalities: gel packs to B knees in sitting- wide chair- x 10 minutes-NOT TODAY   Precautions: HTN, type 2 diabetes, poor historian   Exercises:  Exercise Reps/ Time Weight/ Level Comments   Scifit/nu-step 5 min L0 Attempted resistance, but unable to consistently perform at appropriate pace to hold resistance; manually timed by PT    STANDING      3 way hip 15x ea 1# Increased ; inc UE A and trunk A on // bars, NOT TODAY    Heel raises  20x  B UE A; inc trunk flexion   Hamstring curls 20x 1# Increased 4/2; ALT LEs; cues for coordination, NOT TODAY    NBOS  2 x 20\"  On foam; arms crossed; SBA PT   Semi Tandem stance 2 x 20\"  Arms crossed, SBA PT.    STS  2 x 5  Wide chair   Alt toe taps  x30\" 4\"    Lateral walking 2 x 30\"  TCs for dec B trunk sway; neutral rotation to mirror; veers toward mirror each direction, NOT TODAY          SUPINE         Heel slides 20x ea   Orange slider, therapist applied over pressure last 5x on ea; TCs for dec toe-out   Bridges 15x - Cannot clear table, increased reps 4/9   SLR 20x ea 1#    Hook lying clamshells  25x red    LAQ 20x 2#              SEATED         Hip add 20 x 5\"   Ball   LAQ 20x ea 2# Increased 4/2; ALT LEs   Marches  20x ea 2# Increased 4/2; ALT LEs; attempted hands clasped   HS curl 20x ea  red    HS stretch 2 x 20\" ea   LE fully extended, hip hinge, foot on stool   Other:       LEFI: 35/80, 56% perceived impairment     ROM  LEFT RIGHT   Knee Flex 90A/  100P 94A/ 98P   Ext 4A/2P 5A/1P      STRENGTH     Left Right   Hip Flex 4 4   ABD (seated) 4+ 4+   ADD(seated) 4+ 4+   Knee Flex 4 4   Ext 4 4   Ankle DF 5 5   PF 4- 4-       Specific Instructions for next treatment:  // bar squats; and retro ambulation next visit. B knee ROM and strengthening (begin OKC and progress to CKC as tolerated), gait training with least restrictive AD (likely will be RW d/t B knee pain), modalities as needed for pain modulation.       Treatment Charges: Mins Units   []  Modalities: CP     [x]  Ther Exercise 55 4   []  Manual Therapy     []  Ther Activities     []  Aquatics     []  Vasocompression     []  Other: gait train     Total Treatment time 55 4       Assessment: [x] Progressing toward goals. Pt required more frequent cueing this date for proper execution of exercises. Pt able to tolerate all standing exercises without reports of increased pain. STG's assessed today- see measurements above. Thus far, farhad has made improvements in overall pain levels and functional mobility. Pt still demo's deficits in BLE strength and B knee ROM.  Will continue to address ROM and strength deficits for the remainder of visits. [] No change. [] Other:    STG: (to be met in 8 treatments)  1. ? Pain: Pt will report <8/10 B knee pain in weightbearing positions to improve tolerance to standing and ambulation- MET, reports average 4/10 pain with short distances   2. ? ROM:  a. Pt will improve B knee flexion AROM by 3-5 ° for improved mobility in and out of cars- R knee MET, L knee NOT MET  b. Pt will improve B knee extension AROM to 0 ° for improved gait mechanics- NOT MET   ? Strength: pt will improve gross BLE strength by 1/2 grade for improved ability to navigate curbs, steps, etc.- Progress made, see measurements above (bolded improved)  3. ? Function:  a. Pt will report ability to stand for 5 minutes without increases in B knee pain for improved ability to  line at grocery store- MET  b. Pt will improve LEFI score by 3-5 points to indicate improved functional mobility- MET, improved 15 points  4. Independent with Home Exercise Programs- MET  5. Demonstrate Knowledge of fall prevention- MET  LTG: (to be met in 14 treatments)  1. Pt will report 6/10 or lower B knee pain in weightbearing positions for improved QOL  2. Pt will improve B knee flexion AROM to 95 ° or greater for improved ability to put on shoes  3. Pt will improve LEFI score to  >25/80 to indicate improved functional mobility   4. Pt will improve TUG score by 2 seconds in order to reduce risk of falls  5. Pt will improve BLE strength to the following for improved ability to complete functional tasks:  a. Hip flexion to 4-/5   b. Knee flexion to 4+/5  c. Plantarflexion to 4/5  6. Pt will report ability to ambulate in grocery store with least restrictive assistive device for 5 minutes without increased B knee pain for improved community mobility                 Patient goals: lose weight, strengthen knees     Pt.  Education:  [x] Yes  [] No  [] Reviewed Prior HEP/Ed  Method of Education: [x] Verbal  [] Demo  [] Written  3/22  Provided fall prevention hand out and reviewed prior HEP given. Comprehension of Education:  [x] Verbalizes understanding. [] Demonstrates understanding. [] Needs review. [] Demonstrates/verbalizes HEP/Ed previously given. Educated regarding rationale for forward gaze during all interventions in order to dec trunk flexion, protect lumbar spine, and inc B knee proprioception- verbalized understanding. Plan: [x] Continue per plan of care. [x] Other: Consider tandem stance balance; // bar squats; and retro ambulation next visit.        Time In: 8:00AM          Time Out: 9:00 AM    Electronically signed by:  Clint Pacheco PT

## 2019-04-12 NOTE — PROGRESS NOTES
[] [facility]) [facility] &  Therapy  [address]  [phone]  F: [phone] [] [address]  [place] [address]   [address]  P: [phone]  F: [phone] [] [place]  [address] [facility] [address]  P: [phone]  F: [phone] [] [address]  [facility]   [address]  [place]: [phone]  F: [phone]     Physical Therapy Progress Note    Date: 2019      Patient: Ida Zamudio  : 1970  MRN: [id number]    Physician: Juwan Cooper MD                        Insurance: [facility], pre-auth required after 12 visits   Medical Diagnosis: M17.0 (ICD-10-CM) - Primary osteoarthritis of both knees                   Rehab Codes: M25.561, M25.562, M25.661, M25.662, R26.2, M62.81  Onset date:                    Next Dr's appt. : 19  Visit# / total visits:                     Cancels/No Shows: 0/0  Date of initial visit: 3/19/19                    Subjective:    Pain:  [x] Yes  [] No          Location: B knees- R > L        Pain Rating: (0-10 scale) \"sore\"   Pain altered Tx:  [x] No  [] Yes  Action:  Comments: Pt states that overall she's doing well, reports slight soreness in B knees.      Ambulates into clinic without AD and sig B lateral trunk sway, wide NICKI, inc toe-out B.     Objective:  LEFI: 35/80, 56% perceived impairment      ROM  LEFT RIGHT   Knee Flex 90A/  100P 94A/ 98P   Ext 4A/2P 5A/1P             STRENGTH     Left Right   Hip Flex 4 4   ABD (seated) 4+ 4+   ADD(seated) 4+ 4+   Knee Flex 4 4   Ext 4 4   Ankle DF 5 5   PF 4- 4-   (bolded improved)       Assessment:  [x] Progressing toward goals. Pt required more frequent cueing this date for proper execution of exercises. Pt able to tolerate all standing exercises without reports of increased pain.  STG's assessed today- see measurements above. Thus far, farhad has made improvements in overall pain levels and functional mobility. Pt still demo's deficits in BLE strength and B knee ROM. Will continue to address ROM and strength deficits for the remainder of visits. [] No change. [] Other:     STG: (to be met in 8 treatments)  1. ? Pain: Pt will report <8/10 B knee pain in weightbearing positions to improve tolerance to standing and ambulation- MET, reports average 4/10 pain with short distances   2. ? ROM:  a. Pt will improve B knee flexion AROM by 3-5 ° for improved mobility in and out of cars- R knee MET, L knee NOT MET  b. Pt will improve B knee extension AROM to 0 ° for improved gait mechanics- NOT MET   ? Strength: pt will improve gross BLE strength by 1/2 grade for improved ability to navigate curbs, steps, etc.- Progress made, see measurements above (bolded improved)  3. ? Function:  a. Pt will report ability to stand for 5 minutes without increases in B knee pain for improved ability to  line at grocery store- MET  b. Pt will improve LEFI score by 3-5 points to indicate improved functional mobility- MET, improved 15 points  4. Independent with Home Exercise Programs- MET  5. Demonstrate Knowledge of fall prevention- MET  LTG: (to be met in 14 treatments)  1. Pt will report 6/10 or lower B knee pain in weightbearing positions for improved QOL  2. Pt will improve B knee flexion AROM to 95 ° or greater for improved ability to put on shoes  3. Pt will improve LEFI score to  >25/80 to indicate improved functional mobility   4. Pt will improve TUG score by 2 seconds in order to reduce risk of falls  5. Pt will improve BLE strength to the following for improved ability to complete functional tasks:  a. Hip flexion to 4-/5   b.  Knee flexion to 4+/5  c. Plantarflexion to 4/5  6.  Pt will report ability to ambulate in grocery store with least restrictive assistive device for 5 minutes without increased B knee pain for improved community mobility       Treatment to Date:  [x] Therapeutic Exercise    [x] Modalities:  [x] Therapeutic Activity    [] Ultrasound  [] Electrical Stimulation  [x] Gait Training     [] Massage       [] Lumbar/Cervical Traction  [] Neuromuscular Re-education [x] Cold/hotpack [] Iontophoresis: 4 mg/mL  [x] Instruction in Home Exercise Program                     Dexamethasone Sodium  [] Manual Therapy             Phosphate 40-80 mAmin  [] Aquatic Therapy                   [] Vasocompression/   [] Other:  [] Dry Needling                                           Game Ready        Patient Status:     [x] Continue per initial plan of care. [] Additional visits necessary. [] Other:      Electronically signed by Payal Mendez PT on 4/12/2019 at 10:18 AM      If you have any questions or concerns, please don't hesitate to call. Thank you for your referral.    Physician Signature:________________________________Date:__________________  By signing above or cosigning this note, I have reviewed this plan of care and certify a need for medically necessary rehabilitation services.      *PLEASE SIGN ABOVE AND FAX BACK ALL PAGES*

## 2019-04-16 ENCOUNTER — HOSPITAL ENCOUNTER (OUTPATIENT)
Dept: PHYSICAL THERAPY | Facility: CLINIC | Age: 49
Setting detail: THERAPIES SERIES
Discharge: HOME | End: 2019-04-16
Payer: COMMERCIAL

## 2019-04-16 PROCEDURE — 97110 THERAPEUTIC EXERCISES: CPT

## 2019-04-16 NOTE — FLOWSHEET NOTE
[] [phone]  - [facility]  [address]  [phone]  F: [phone] [x] [address]  [place] [address]   [address]  P: [phone]  F: [phone] [] [place]  [address]  P: [phone]  F: [phone] [] [address]  [facility]   [address]  [place]: [phone]  F: [phone]     Physical Therapy Daily Treatment Note    Date:  2019  Patient Name:  Lyudmila Stafford    :  1970  MRN: [id number]  Physician: Peter Logan MD                        Insurance: Sabino Sprinkle required after 12 visits   Medical Diagnosis: M17.0 (ICD-10-CM) - Primary osteoarthritis of both knees                   Rehab Codes: M25.561, M25.562, M25.661, M25.662, R26.2, M62.81  Onset date: 2016                   Next 's appt. : 19  Visit# / total visits:   Cancels/No Shows: 0/0    Subjective:    Pain:  [x] Yes  [] No Location: B knees- R > L Pain Rating: (0-10 scale) \"sore\"   Pain altered Tx:  [x] No  [] Yes  Action:    Comments:      Objective:  Modalities: gel packs to B knees in sitting- wide chair- x 10 minutes-NOT TODAY   Precautions: HTN, type 2 diabetes, poor historian   Exercises:  Exercise Reps/ Time Weight/ Level Comments   Scifit/nu-step 5 min L0 Attempted resistance, but unable to consistently perform at appropriate pace to hold resistance; manually timed by PT    STANDING      3 way hip 15x ea Red  Increased ; inc UE A and trunk A on // bars    Heel raises  20x  B UE A; inc trunk flexion   Hamstring curls 20x 1# Increased ; ALT LEs; cues for coordination   Mini squats  15x  Added    NBOS  2 x 20\"  On foam; arms crossed; SBA PT   Semi Tandem stance 2 x 20\"  Arms crossed, SBA PT.    STS  2 x 5  Wide chair   Alt toe taps  x30\" 4\"    Lateral walking 2 x 30'  TCs for dec B trunk sway; neutral rotation to mirror; veers toward mirror each direction, NOT TODAY    Retro walking  1x30'  Added 4/16 Slow pace, small steps          SUPINE         Heel slides 20x ea   Orange slider, therapist applied over pressure last 5x on ea; TCs for dec toe-out   Bridges 15x - Cannot clear table, increased reps 4/9   SLR 20x ea 2#    Hook lying clamshells  25x red    SAQ 20x 2#              SEATED         Hip add 20 x 5\"   Ball   LAQ 20x ea 2# Increased 4/2; ALT LEs   Marches  20x ea 2# Increased 4/2; ALT LEs; attempted hands clasped   HS curl 20x ea  red    HS stretch 2 x 20\" ea   LE fully extended, hip hinge, foot on stool   Other:       LEFI: 35/80, 56% perceived impairment     ROM  LEFT RIGHT   Knee Flex 90A/  100P 94A/ 98P   Ext 4A/2P 5A/1P      STRENGTH     Left Right   Hip Flex 4 4   ABD (seated) 4+ 4+   ADD(seated) 4+ 4+   Knee Flex 4 4   Ext 4 4   Ankle DF 5 5   PF 4- 4-       Specific Instructions for next treatment:       B knee ROM and strengthening (begin OKC and progress to CKC as tolerated), gait training with least restrictive AD (likely will be RW d/t B knee pain), modalities as needed for pain modulation.       Treatment Charges: Mins Units   []  Modalities: CP     [x]  Ther Exercise 38 3   []  Manual Therapy     []  Ther Activities     []  Aquatics     []  Vasocompression     []  Other: gait train     Total Treatment time 38 3       Assessment: [x] Progressing toward goals. Added retro walking and mini squats today with fair tolerance. Continued other exercises without complaints. [] No change. [] Other:    STG: (to be met in 8 treatments)  1. ? Pain: Pt will report <8/10 B knee pain in weightbearing positions to improve tolerance to standing and ambulation- MET, reports average 4/10 pain with short distances   2. ? ROM:  a. Pt will improve B knee flexion AROM by 3-5 ° for improved mobility in and out of cars- R knee MET, L knee NOT MET  b.  Pt will improve B knee extension AROM to 0 ° for improved gait mechanics- NOT MET   ? Strength: pt will improve gross BLE strength by 1/2 grade for improved ability to navigate curbs, steps, etc.- Progress made, see measurements above (bolded improved)  3. ? Function:  a. Pt will report ability to stand for 5 minutes without increases in B knee pain for improved ability to  line at grocery store- MET  b. Pt will improve LEFI score by 3-5 points to indicate improved functional mobility- MET, improved 15 points  4. Independent with Home Exercise Programs- MET  5. Demonstrate Knowledge of fall prevention- MET  LTG: (to be met in 14 treatments)  1. Pt will report 6/10 or lower B knee pain in weightbearing positions for improved QOL  2. Pt will improve B knee flexion AROM to 95 ° or greater for improved ability to put on shoes  3. Pt will improve LEFI score to  >25/80 to indicate improved functional mobility   4. Pt will improve TUG score by 2 seconds in order to reduce risk of falls  5. Pt will improve BLE strength to the following for improved ability to complete functional tasks:  a. Hip flexion to 4-/5   b. Knee flexion to 4+/5  c. Plantarflexion to 4/5  6. Pt will report ability to ambulate in grocery store with least restrictive assistive device for 5 minutes without increased B knee pain for improved community mobility                 Patient goals: lose weight, strengthen knees     Pt. Education:  [x] Yes  [] No  [] Reviewed Prior HEP/Ed  Method of Education: [x] Verbal  [x] Demo for new exercises   [] Written  3/22  Provided fall prevention hand out and reviewed prior HEP given. Comprehension of Education:  [x] Verbalizes understanding. [x] Demonstrates understanding. [x] Needs review. [x] Demonstrates/verbalizes HEP/Ed previously given.    Educated regarding rationale for forward gaze during all interventions in order to dec trunk flexion, protect lumbar spine, and inc B knee proprioception- verbalized understanding. Plan: [x] Continue per plan of care. [x] Other: Consider tandem stance balance; // bar squats; and retro ambulation next visit.        Time In: 7:55AM          Time Out: 8:40 AM    Electronically signed by:  Jess Goldman PTA

## 2019-04-18 ENCOUNTER — OFFICE VISIT (OUTPATIENT)
Dept: PAIN MANAGEMENT | Age: 49
End: 2019-04-18
Payer: COMMERCIAL

## 2019-04-18 VITALS
OXYGEN SATURATION: 96 % | HEIGHT: 65 IN | HEART RATE: 79 BPM | SYSTOLIC BLOOD PRESSURE: 151 MMHG | BODY MASS INDEX: 48.82 KG/M2 | WEIGHT: 293 LBS | DIASTOLIC BLOOD PRESSURE: 94 MMHG

## 2019-04-18 DIAGNOSIS — M17.0 PRIMARY OSTEOARTHRITIS OF BOTH KNEES: Primary | ICD-10-CM

## 2019-04-18 DIAGNOSIS — E66.01 MORBID OBESITY WITH BMI OF 50.0-59.9, ADULT (HCC): ICD-10-CM

## 2019-04-18 PROCEDURE — G8417 CALC BMI ABV UP PARAM F/U: HCPCS | Performed by: ANESTHESIOLOGY

## 2019-04-18 PROCEDURE — G8427 DOCREV CUR MEDS BY ELIG CLIN: HCPCS | Performed by: ANESTHESIOLOGY

## 2019-04-18 PROCEDURE — 1036F TOBACCO NON-USER: CPT | Performed by: ANESTHESIOLOGY

## 2019-04-18 PROCEDURE — 99214 OFFICE O/P EST MOD 30 MIN: CPT | Performed by: ANESTHESIOLOGY

## 2019-04-18 ASSESSMENT — ENCOUNTER SYMPTOMS: RESPIRATORY NEGATIVE: 1

## 2019-04-18 NOTE — PROGRESS NOTES
 Hyperlipidemia     Hypertension     Increased frequency of urination 8/27/2018    Myopia 4/12/2017    OAG (open angle glaucoma) suspect, low risk 5/4/2017    Obesity     Palpitations     occasional    Schizophrenia ([address]     Sleep apnea since Oct. 2017    CPAP nightly    Urge incontinence of urine 8/27/2018    Wears glasses      Past Surgical History:   Procedure Laterality Date    DILATION AND CURETTAGE OF UTERUS N/A 11/14/2017    OPERATIVE HYSTEROSCOPY WITH MYOSURE, EXCISION ENDOMETRIAL POLYPS performed by Kg Ceballos MD at [address] [facility] NERV Bilateral 3/25/2019    BILATERAL KNEE FLORO GUIDED STEROID INJECTION performed by Yesika Valenzuela MD at [facility] HYSTEROSCOPY  11/14/2017    exc endometrial polyps    OTHER SURGICAL HISTORY Right 10/17/14    excision dorsal exostosis rt foot     Allergies   Allergen Reactions    Lisinopril      Makes her cough     Current Outpatient Medications   Medication Sig Dispense Refill    diclofenac sodium (VOLTAREN) 1 % GEL Apply 4 g topically 4 times daily 4 Tube 1    Elastic Bandages & Supports (KNEE BRACE ADJUSTABLE HINGED) MISC Use as directed 1 each 0    Elastic Bandages & Supports (KNEE BRACE) MISC 2 Units by Does not apply route daily 2 each 0    gabapentin (NEURONTIN) 300 MG capsule Take 1 capsule by mouth. .      albuterol sulfate  (90 Base) MCG/ACT inhaler Inhale 2 puffs into the lungs every 6 hours as needed for Wheezing 1 Inhaler 3    ibuprofen (ADVIL;MOTRIN) 800 MG tablet Take 1 tablet by mouth every 6 hours as needed for Pain 30 tablet 0    DICLOFENAC POTASSIUM PO Take 50 mg by mouth 3 times daily as needed      ARIPiprazole (ABILIFY) 15 MG tablet Take 15 mg by mouth nightly       traZODone (DESYREL) 50 MG tablet Take 50 mg by mouth nightly      pravastatin (PRAVACHOL) 80 MG tablet Take 80 mg by mouth nightly       oxybutynin (DITROPAN-XL) 10 MG extended release tablet Take 10 mg by mouth daily      potassium chloride (MICRO-K) 10 MEQ CR capsule Take 10 mEq by mouth daily.  valsartan (DIOVAN) 40 MG tablet Take 40 mg by mouth nightly Patient takes at night      metFORMIN (GLUCOPHAGE) 500 MG tablet Take 500 mg by mouth daily (with breakfast).  hydrochlorothiazide (MICROZIDE) 12.5 MG capsule Take 12.5 mg by mouth nightly       amLODIPine (NORVASC) 10 MG tablet Take 10 mg by mouth nightly Patient takes at night      simvastatin (ZOCOR) 20 MG tablet Take 80 mg by mouth nightly.  fluticasone (FLOVENT HFA) 110 MCG/ACT inhaler Inhale 1 puff into the lungs 2 times daily 1 Inhaler 11     No current facility-administered medications for this visit.         Social History     Socioeconomic History    Marital status: Single     Spouse name: None    Number of children: 0    Years of education: None    Highest education level: None   Occupational History    None   Social Needs    Financial resource strain: None    Food insecurity:     Worry: None     Inability: None    Transportation needs:     Medical: None     Non-medical: None   Tobacco Use    Smoking status: Passive Smoke Exposure - Never Smoker    Smokeless tobacco: Never Used    Tobacco comment: associates smoking   Substance and Sexual Activity    Alcohol use: No    Drug use: No    Sexual activity: Never   Lifestyle    Physical activity:     Days per week: None     Minutes per session: None    Stress: None   Relationships    Social connections:     Talks on phone: None     Gets together: None     Attends Jew service: None     Active member of club or organization: None     Attends meetings of clubs or organizations: None     Relationship status: None    Intimate partner violence:     Fear of current or ex partner: None     Emotionally abused: None     Physically abused: None     Forced sexual activity: None   Other Topics Concern    None   Social History Narrative    None     Family History   Problem Relation Age of Onset    Diabetes Mother     Heart Failure Mother     Diabetes Maternal Grandmother     Heart Failure Maternal Grandmother     No Known Problems Maternal Grandfather     No Known Problems Paternal Grandmother     No Known Problems Paternal Grandfather     Cancer Paternal Aunt          Objective:   Physical Exam   Constitutional: She is oriented to person, place, and time. Morbidly obese   Musculoskeletal:        Right knee: She exhibits decreased range of motion. Tenderness found. Left knee: She exhibits decreased range of motion. Tenderness found. Neurological: She is alert and oriented to person, place, and time. Psychiatric: She has a normal mood and affect. Her behavior is normal.   Nursing note and vitals reviewed. Assessment:        1. Primary osteoarthritis of both knees    2. Morbid obesity with BMI of 50.0-59.9, adult ([facility] [address]            Plan:        Chronic bilateral knee pain, diagnosed with knee arthritis  Failed conservative measures  Failed physical therapy  Tried oral and topical anti-inflammatory medications line tried knee bracing  Tried knee steroid injection with no benefit  I think patient is meeting the criteria for knee viscose supplementation      she is not a candidate for opioid considering morbid obesity associated increased risk of respiratory depression with use of opioids  Risk of opioid discussed in detail with patient      Orders Placed This Encounter   Procedures    WV ARTHROCENTESIS ASPIR&/INJ MAJOR JT/BURSA W/O US     Knee Synvisc injection serres of 3 fluoro guided     Standing Status:   Future     Standing Expiration Date:   7/18/2019     Orders Placed This Encounter   Medications    diclofenac sodium (VOLTAREN) 1 % GEL     Sig: Apply 4 g topically 4 times daily     Dispense:  4 Tube     Refill:  1     r  Controlled Substances Monitoring:     RX Monitoring 4/18/2019   Attestation The Prescription Monitoring Report for this patient was reviewed today.    Chronic Pain Routine Monitoring No signs of potential drug abuse or diversion identified: otherwise, see note documentation             Eric Loyd MD

## 2019-04-19 ENCOUNTER — APPOINTMENT (OUTPATIENT)
Dept: PHYSICAL THERAPY | Facility: CLINIC | Age: 49
End: 2019-04-19
Payer: COMMERCIAL

## 2019-04-26 ENCOUNTER — HOSPITAL ENCOUNTER (OUTPATIENT)
Dept: PHYSICAL THERAPY | Facility: CLINIC | Age: 49
Setting detail: THERAPIES SERIES
Discharge: HOME | End: 2019-04-26
Payer: COMMERCIAL

## 2019-04-26 PROCEDURE — 97110 THERAPEUTIC EXERCISES: CPT

## 2019-04-26 NOTE — FLOWSHEET NOTE
[] [facility]) - [facility] &  Therapy  [address]  [phone]  F: [phone] [x] [address]  [facility] [address]   [address]  P: [phone]  F: [phone] [] [address]  [address] [facility]  P: [phone]  F: [phone] [] [address]  [facility]   [address]  [place]: [phone]  F: [phone]     Physical Therapy Daily Treatment Note    Date:  2019  Patient Name:  Boris Pope    :  1970  MRN: [id number]  Physician: Keegan Jones MD                        Insurance: [employer] required after 12 visits   Medical Diagnosis: M17.0 (ICD-10-CM) - Primary osteoarthritis of both knees                   Rehab Codes: M25.561, M25.562, M25.661, M25.662, R26.2, M62.81  Onset date:                    Next 's appt. : 19  Visit# / total visits: 10/14  Cancels/No Shows: 0/0    Subjective:    Pain:  [x] Yes  [] No Location: B knees- R > L Pain Rating: (0-10 scale) 0/10  Pain altered Tx:  [x] No  [] Yes  Action:  Comments: Pt denies pain at this time, states she has been keeping busy around home.        Objective:  Modalities: gel packs to B knees in sitting- wide chair- x 10 minutes-NOT TODAY   Precautions: HTN, type 2 diabetes, poor historian   Exercises:  Exercise Reps/ Time Weight/ Level Comments   nu-step 5 min L0 Pt with better pace noted this date    STANDING      3 way hip 15x ea Red   inc UE A and trunk A on // bars, mod verbal and tactile cueing required    Heel raises  20x  B UE A; inc trunk flexion   Hamstring curls 20x 1#  ALT LEs; cues for coordination   Mini squats  15x  NOT TODAY, painful    NBOS  2 x 20\"  On foam; arms crossed; SBA PT   Semi Tandem stance 2 x 20\"  On foam; arms crossed, SBA PT.    STS  2 x 5  Wide chair   Alt toe taps  x30\" 4\" treatments)  1. ? Pain: Pt will report <8/10 B knee pain in weightbearing positions to improve tolerance to standing and ambulation- MET, reports average 4/10 pain with short distances   2. ? ROM:  a. Pt will improve B knee flexion AROM by 3-5 ° for improved mobility in and out of cars- R knee MET, L knee NOT MET  b. Pt will improve B knee extension AROM to 0 ° for improved gait mechanics- NOT MET   ? Strength: pt will improve gross BLE strength by 1/2 grade for improved ability to navigate curbs, steps, etc.- Progress made, see measurements above (bolded improved)  3. ? Function:  a. Pt will report ability to stand for 5 minutes without increases in B knee pain for improved ability to  line at grocery store- MET  b. Pt will improve LEFI score by 3-5 points to indicate improved functional mobility- MET, improved 15 points  4. Independent with Home Exercise Programs- MET  5. Demonstrate Knowledge of fall prevention- MET  LTG: (to be met in 14 treatments)  1. Pt will report 6/10 or lower B knee pain in weightbearing positions for improved QOL  2. Pt will improve B knee flexion AROM to 95 ° or greater for improved ability to put on shoes  3. Pt will improve LEFI score to  >25/80 to indicate improved functional mobility   4. Pt will improve TUG score by 2 seconds in order to reduce risk of falls  5. Pt will improve BLE strength to the following for improved ability to complete functional tasks:  a. Hip flexion to 4-/5   b. Knee flexion to 4+/5  c. Plantarflexion to 4/5  6. Pt will report ability to ambulate in grocery store with least restrictive assistive device for 5 minutes without increased B knee pain for improved community mobility                 Patient goals: lose weight, strengthen knees     Pt. Education:  [x] Yes  [] No  [] Reviewed Prior HEP/Ed  Method of Education: [x] Verbal  [x] Demo for new exercises   [] Written  3/22  Provided fall prevention hand out and reviewed prior HEP given. Comprehension of Education:  [x] Verbalizes understanding. [x] Demonstrates understanding. [x] Needs review. [x] Demonstrates/verbalizes HEP/Ed previously given. Educated regarding rationale for forward gaze during all interventions in order to dec trunk flexion, protect lumbar spine, and inc B knee proprioception- verbalized understanding. Plan: [x] Continue per plan of care. [x] Other: Consider tandem stance balance; // bar squats; and retro ambulation next visit.        Time In: 8:00 AM          Time Out: 8:48 AM    Electronically signed by:  Fredi Solitario, PT

## 2019-04-30 ENCOUNTER — HOSPITAL ENCOUNTER (OUTPATIENT)
Dept: PHYSICAL THERAPY | Facility: CLINIC | Age: 49
Setting detail: THERAPIES SERIES
Discharge: HOME | End: 2019-04-30
Payer: COMMERCIAL

## 2019-04-30 PROCEDURE — 97110 THERAPEUTIC EXERCISES: CPT

## 2019-04-30 NOTE — FLOWSHEET NOTE
[] [facility]) [facility] &  Therapy  [address]  [phone]  F: [phone] [x] [address]  [facility] [address]   [address]  P: [phone]  F: [phone] [] [address]  [address] [facility]  P: [phone]  F: [phone] [] [address]  [facility]   [address]  [place]: [phone]  F: [phone]     Physical Therapy Daily Treatment Note    Date:  2019  Patient Name:  Mika Acuña    :  1970  MRN: [id number]  Physician: Rex Hashimoto, MD                        Insurance: [employer] required after 12 visits   Medical Diagnosis: M17.0 (ICD-10-CM) - Primary osteoarthritis of both knees                   Rehab Codes: M25.561, M25.562, M25.661, M25.662, R26.2, M62.81  Onset date: 2016                   Next 's appt. : 19  Visit# / total visits:   Cancels/No Shows: 0/0    Subjective:    Pain:  [x] Yes  [] No Location: B knees- R > L Pain Rating: (0-10 scale) 2/10 in Right knee only today  Pain altered Tx:  [x] No  [] Yes  Action:    Comments: Pt reports only minimal pain in her right knee. She states she has been keeping busy around home. Admits to not completing HEP.        Objective:  Modalities: gel packs to B knees in sitting- wide chair- x 10 minutes-NOT TODAY   Precautions: HTN, type 2 diabetes, poor historian   Exercises:  Exercise Reps/ Time Weight/ Level Comments   nu-step 5 min L0 Pt with better pace noted this date    STANDING      3 way hip 15x ea Red   inc UE A and trunk A on // bars, mod verbal and tactile cueing required    Heel raises  20x  B UE A; inc trunk flexion   Hamstring curls 20x 2#  ALT LEs; cues for coordination INCREASED WEIGHT 4/30   Mini squats  15x  NOT TODAY, painful    NBOS  2 x 20\"  On foam; arms crossed; SBA PT. ATTEMPTED-unable to complete 4/30, extreme pain in right knee   Semi Tandem stance 2 x 20\"  On foam; arms crossed, SBA PT. NOT TODAY   WBOS EC 2x15\"  SBA, on foam ADDED 4/30   STS  2 x 5  Wide chair   Alt toe taps  x30\" 4\"    Lateral walking 2 x 30'  TCs for dec B trunk sway; neutral rotation to mirror; veers toward mirror each direction   Retro walking  1x30'  Slow pace, small steps    Lateral step ups x  Attempted 4/26- unable to complete d/t pain         SUPINE         Heel slides 20x ea   Orange slider, therapist applied over pressure last 5x on ea; TCs for dec toe-out   Bridges 15x - Cannot clear table, increased reps 4/9   SLR 20x ea 2#    Hook lying clamshells  25x red    SAQ 20x 2#              SEATED         Hip add 20 x 5\"   Ball   LAQ 20x ea 2# Increased 4/2; ALT LEs   Marches  20x ea 2# Increased 4/2; ALT LEs; attempted hands clasped   HS curl 20x ea  red    HS stretch 2 x 20\" ea   LE fully extended, hip hinge, foot on stool   Other:       LEFI: 35/80, 56% perceived impairment     ROM  LEFT RIGHT   Knee Flex 90A/  100P 94A/ 98P   Ext 4A/2P 5A/1P      STRENGTH     Left Right   Hip Flex 4 4   ABD (seated) 4+ 4+   ADD(seated) 4+ 4+   Knee Flex 4 4   Ext 4 4   Ankle DF 5 5   PF 4- 4-       Specific Instructions for next treatment:       B knee ROM and strengthening (begin OKC and progress to CKC as tolerated), gait training with least restrictive AD (likely will be RW d/t B knee pain), modalities as needed for pain modulation.       Treatment Charges: Mins Units   []  Modalities: CP     [x]  Ther Exercise 45 3   []  Manual Therapy     []  Ther Activities     []  Aquatics     []  Vasocompression     []  Other: gait train     Total Treatment time 45 3       Assessment: [x] Progressing toward goals. Patient requiring multiple verbal and tactile cueing for proper form and posture this date. Patient only required a few short rest breaks today after walking exercises and sit to stand. Attempted narrow base of support balance exercise, but patient experienced excessive popping and pain in right knee so clinician instructed patient to widen base of support and she was able to complete. Once again attempted lateral step ups, but patient was unable to complete due to extreme pain. During lateral walking, patient had difficulty stepping medially with left leg and was stepping posteriorly about half of the time even after verbal cueing and demonstration. Continued need for skilled therapy to strengthen knees and to educate patient on proper form, posture, and to be independent in HEP. [] No change. [] Other:    STG: (to be met in 8 treatments)  1. ? Pain: Pt will report <8/10 B knee pain in weightbearing positions to improve tolerance to standing and ambulation- MET, reports average 4/10 pain with short distances   2. ? ROM:  a. Pt will improve B knee flexion AROM by 3-5 ° for improved mobility in and out of cars- R knee MET, L knee NOT MET  b. Pt will improve B knee extension AROM to 0 ° for improved gait mechanics- NOT MET   ? Strength: pt will improve gross BLE strength by 1/2 grade for improved ability to navigate curbs, steps, etc.- Progress made, see measurements above (bolded improved)  3. ? Function:  a. Pt will report ability to stand for 5 minutes without increases in B knee pain for improved ability to  line at grocery store- MET  b. Pt will improve LEFI score by 3-5 points to indicate improved functional mobility- MET, improved 15 points  4. Independent with Home Exercise Programs- MET  5. Demonstrate Knowledge of fall prevention- MET  LTG: (to be met in 14 treatments)  1. Pt will report 6/10 or lower B knee pain in weightbearing positions for improved QOL  2. Pt will improve B knee flexion AROM to 95 ° or greater for improved ability to put on shoes  3. Pt will improve LEFI score to  >25/80 to indicate improved functional mobility   4. Pt will improve TUG score by 2 seconds in order to reduce risk of falls  5.  Pt will improve BLE strength to the following for improved ability to complete functional tasks:  a. Hip flexion to 4-/5   b. Knee flexion to 4+/5  c. Plantarflexion to 4/5  6. Pt will report ability to ambulate in grocery store with least restrictive assistive device for 5 minutes without increased B knee pain for improved community mobility                 Patient goals: lose weight, strengthen knees     Pt. Education:  [x] Yes  [] No  [] Reviewed Prior HEP/Ed  Method of Education: [x] Verbal: 4/30 importance of HEP  [] Demo for new exercises   [] Written  3/22  Provided fall prevention hand out and reviewed prior HEP given. Comprehension of Education:  [x] Verbalizes understanding. [] Demonstrates understanding. [x] Needs review. [] Demonstrates/verbalizes HEP/Ed previously given. Plan: [x] Continue per plan of care. [] Other:       Time In: 7:45 AM          Time Out: 8:40 AM    Electronically signed by: Ganesh Hernández  SPTA Treatment performed by Student PTA under the supervision of co-signing PTA who agrees with all treatment and documentation.    Deloi Stearns, PTA

## 2019-05-03 ENCOUNTER — HOSPITAL ENCOUNTER (OUTPATIENT)
Dept: PHYSICAL THERAPY | Facility: CLINIC | Age: 49
Setting detail: THERAPIES SERIES
Discharge: HOME | End: 2019-05-03
Payer: COMMERCIAL

## 2019-05-03 PROCEDURE — 97110 THERAPEUTIC EXERCISES: CPT

## 2019-05-03 NOTE — DISCHARGE SUMMARY
[] [facility]) - [facility] &  Therapy  [address]  [phone]  F: [phone] [x] [address]  [facility] [address]   [address]  P: [phone]  F: [phone] [] [place]  [address]  P: [phone]  F: [phone] [] [address]  [facility]   [address]   [place]: [phone]  F: [phone]     Physical Therapy Discharge Note    Date: 5/3/2019      Patient: Bessy Harvey  : 1970  MRN: [id number]    Physician: Juwan Nielsen MD                        Insurance: [employer], pre-auth required after 12 visits   Medical Diagnosis: M17.0 (ICD-10-CM) - Primary osteoarthritis of both knees                   Rehab Codes: M25.561, M25.562, M25.661, M25.662, R26.2, M62.81  Onset date: 2016                   Next 's appt. : 19  Visit# / total visits:                   Cancels/No Shows: 0/0    Date of initial visit: 3/19/19                Date of final visit: 5/3/19       Subjective:    Pain:  [x] Yes  [] No          Location: B knees- R > L        Pain Rating: (0-10 scale) 2/10   Pain altered Tx:  [x] No  [] Yes  Action:     Comments: Pt she has been too busy to perform HEP.      Objective:  LEFI: 45/80, 44% perceived impairment      ROM  LEFT RIGHT   Knee Flex 90A/  100P 94A/ 98P   Ext 4A/2P 5A/1P             STRENGTH     Left Right   Hip Flex 4 4   ABD (seated) 4+ 4+   ADD(seated) 4+ 4+   Knee Flex 4+ 4   Ext 4 4   Ankle DF 5 5   PF 4- 4-     Assessment:  [x] Progressing toward goals. [x] No change. Patient requiring multiple verbal and tactile cueing for proper form and posture this date for all exercises. Pt experiencing significant B knee pain with mini squats, step ups, and balance exercises this date, thus exercises were held.  Since beginning therapy, patient has made improvements in BLE strength, balance, and pain levels which have positively impacted her functional mobility. Pt's Timed Up and Go improved 2 seconds lowering her fall risk, and reports ~30% improvement on the Lower Extremity Functional Index. Despite these improvements overall from initial evaluation, minimal improvements have been noted since visit 8 d/t inability to progress exercises as patient complains of significant pain. Pt also non-compliant with HEP, which makes it difficult to progress exercises at the clinic. At this time, patient is discharged as she has received maximal benefit; again, importance of completing HEP was stressed. [] Other:     STG: (to be met in 8 treatments)  1. ? Pain: Pt will report <8/10 B knee pain in weightbearing positions to improve tolerance to standing and ambulation- MET, reports average 4/10 pain with short distances   2. ? ROM:  a. Pt will improve B knee flexion AROM by 3-5 ° for improved mobility in and out of cars- R knee MET, L knee NOT MET  b. Pt will improve B knee extension AROM to 0 ° for improved gait mechanics- NOT MET   ? Strength: pt will improve gross BLE strength by 1/2 grade for improved ability to navigate curbs, steps, etc.- Progress made, see measurements above (bolded improved)  3. ? Function:  a. Pt will report ability to stand for 5 minutes without increases in B knee pain for improved ability to  line at grocery store- MET  b. Pt will improve LEFI score by 3-5 points to indicate improved functional mobility- MET, improved 15 points  4. Independent with Home Exercise Programs- MET  5. Demonstrate Knowledge of fall prevention- MET  LTG: (to be met in 14 treatments)  1. Pt will report 6/10 or lower B knee pain in weightbearing positions for improved QOL- MET, reports 4/10 pain on average in weightbearing   2.  Pt will improve B knee flexion AROM to 95 ° or greater for improved ability to put on shoes- NOT MET  3. Pt will improve LEFI score to  >25/80 to indicate improved functional mobility- MET   4. Pt will improve TUG score by 2 seconds in order to reduce risk of falls- MET, improved to 17 seconds  5. Pt will improve BLE strength to the following for improved ability to complete functional tasks:  a. Hip flexion to 4-/5 - MET  b. Knee flexion to 4+/5- L knee MET   c. Plantarflexion to 4/5-Progress made, 4-/5  6.  Pt will report ability to ambulate in grocery store with least restrictive assistive device for 5 minutes without increased B knee pain for improved community mobility-NOT MET, requires motorized cart     Treatment to Date:  [x] Therapeutic Exercise    [x] Modalities:  [x] Therapeutic Activity    [] Ultrasound  [] Electrical Stimulation  [x] Gait Training     [] Massage       [] Lumbar/Cervical Traction  [x] Neuromuscular Re-education [x] Cold/hotpack [] Iontophoresis: 4 mg/mL  [x] Instruction in Home Exercise Program                     Dexamethasone Sodium  [] Manual Therapy             Phosphate 40-80 mAmin  [] Aquatic Therapy                   [] Vasocompression/    [] Other:             Game Ready    Discharge Status:     [] Pt recovered from conditions. Treatment goals were met. [x] Pt received maximum benefit. No further therapy indicated at this time. [] Pt to continue exercise/home instructions independently. [] Therapy interrupted due to:    [] Pt has 2 or more no shows/cancels, is discontinued per our policy. [] Pt has completed prescribed number of treatment sessions. [] Other:         Electronically signed by Xavier Conteh PT on 5/3/2019 at 9:22 AM      If you have any questions or concerns, please don't hesitate to call.   Thank you for your referral.

## 2019-05-03 NOTE — FLOWSHEET NOTE
[] [facility]) - [facility]  [facility]  [address]  [phone]  F: [phone] [x] [address]  [facility]   [address]  P: [phone]  F: [phone] [] [place]  [address] [facility]  P: [phone]  F: [phone] [] [address]  [facility]   [address]  [place]: [phone]  F: [phone]     Physical Therapy Daily Treatment Note    Date:  5/3/2019  Patient Name:  Cuate Millan    :  1970  MRN: [id number]  Physician: Aldo Fairchild MD                        Insurance: VidBid required after 12 visits   Medical Diagnosis: M17.0 (ICD-10-CM) - Primary osteoarthritis of both knees                   Rehab Codes: M25.561, M25.562, M25.661, M25.662, R26.2, M62.81  Onset date: 2016                   Next 's appt. : 19  Visit# / total visits:   Cancels/No Shows: 0/0    Subjective:    Pain:  [x] Yes  [] No Location: B knees- R > L Pain Rating: (0-10 scale) 2/10   Pain altered Tx:  [x] No  [] Yes  Action:    Comments: Pt she has been too busy to perform HEP. Objective:  Modalities: gel packs to B knees in sitting- wide chair- x 10 minutes-NOT TODAY   Precautions: HTN, type 2 diabetes, poor historian   Exercises:  Exercise Reps/ Time Weight/ Level Comments   nu-step 8 min L0 Pt with better pace noted this date    STANDING      3 way hip 15x ea 2#  inc UE A and trunk A on // bars, mod verbal and tactile cueing required    Heel raises  20x  B UE A; inc trunk flexion   Hamstring curls 20x 2#  ALT LEs; cues for coordination INCREASED WEIGHT    march 30x 2# Added /3   Mini squats  15x  NOT TODAY, painful    NBOS  2 x 20\"  On foam; arms crossed; SBA PT.     Semi Tandem stance 2 x 20\"  On foam; arms crossed, SBA PT. NOT TODAY, painful    WBOS EC 2x15\"  SBA, on foam    STS  2 x 5  Wide chair   Alt toe taps  x30\" 4\"    Lateral walking 2 x 30'  TCs for dec B trunk sway; neutral rotation to mirror; veers toward mirror each direction   Retro walking  1x30'  Slow pace, small steps    Lateral step ups x  Attempted 5/3- unable to complete d/t pain         SUPINE         Heel slides 20x ea   Orange slider, therapist applied over pressure last 5x on ea; TCs for dec toe-out   Bridges 20x blueberry Cannot clear table, increased reps 4/9   SLR 20x ea 2#    Hook lying clamshells  25x blueberry    SAQ 20x 2#              SEATED         Hip add 20 x 5\"   Ball   LAQ 20x ea 2# Increased 4/2; ALT LEs   Marches  20x ea 2# Increased 4/2; ALT LEs; attempted hands clasped   HS curl 20x ea  red    HS stretch 2 x 20\" ea   LE fully extended, hip hinge, foot on stool   Other:       LEFI: 35/80, 56% perceived impairment     ROM  LEFT RIGHT   Knee Flex 90A/  100P 94A/ 98P   Ext 4A/2P 5A/1P      STRENGTH     Left Right   Hip Flex 4 4   ABD (seated) 4+ 4+   ADD(seated) 4+ 4+   Knee Flex 4+ 4   Ext 4 4   Ankle DF 5 5   PF 4- 4-       Specific Instructions for next treatment:       B knee ROM and strengthening (begin OKC and progress to CKC as tolerated), gait training with least restrictive AD (likely will be RW d/t B knee pain), modalities as needed for pain modulation.       Treatment Charges: Mins Units   []  Modalities: CP     [x]  Ther Exercise 50 3   []  Manual Therapy     []  Ther Activities     []  Aquatics     []  Vasocompression     []  Other: gait train     Total Treatment time 50 3       Assessment: [x] Progressing toward goals. [x] No change. Patient requiring multiple verbal and tactile cueing for proper form and posture this date for all exercises. Pt experiencing significant B knee pain with mini squats, step ups, and balance exercises this date, thus exercises were held.  Since beginning therapy, patient has made improvements in BLE strength, balance, and pain levels which have positively impacted her functional mobility. Pt's Timed Up and Go improved 2 seconds lowering her fall risk, and reports ~30% improvement on the Lower Extremity Functional Index. Despite these improvements overall from initial evaluation, minimal improvements have been noted since visit 8 d/t inability to progress exercises as patient complains of significant pain. Pt also non-compliant with HEP, which makes it difficult to progress exercises at the clinic. At this time, patient is discharged as she has received maximal benefit; again, importance of completing HEP was stressed. [] Other:    STG: (to be met in 8 treatments)  1. ? Pain: Pt will report <8/10 B knee pain in weightbearing positions to improve tolerance to standing and ambulation- MET, reports average 4/10 pain with short distances   2. ? ROM:  a. Pt will improve B knee flexion AROM by 3-5 ° for improved mobility in and out of cars- R knee MET, L knee NOT MET  b. Pt will improve B knee extension AROM to 0 ° for improved gait mechanics- NOT MET   ? Strength: pt will improve gross BLE strength by 1/2 grade for improved ability to navigate curbs, steps, etc.- Progress made, see measurements above (bolded improved)  3. ? Function:  a. Pt will report ability to stand for 5 minutes without increases in B knee pain for improved ability to  line at grocery store- MET  b. Pt will improve LEFI score by 3-5 points to indicate improved functional mobility- MET, improved 15 points  4. Independent with Home Exercise Programs- MET  5. Demonstrate Knowledge of fall prevention- MET  LTG: (to be met in 14 treatments)  1. Pt will report 6/10 or lower B knee pain in weightbearing positions for improved QOL- MET, reports 4/10 pain on average in weightbearing   2. Pt will improve B knee flexion AROM to 95 ° or greater for improved ability to put on shoes- NOT MET  3. Pt will improve LEFI score to  >25/80 to indicate improved functional mobility- MET   4.  Pt will

## 2019-05-07 ENCOUNTER — APPOINTMENT (OUTPATIENT)
Dept: PHYSICAL THERAPY | Facility: CLINIC | Age: 49
End: 2019-05-07
Payer: COMMERCIAL

## 2019-05-13 ENCOUNTER — HOSPITAL ENCOUNTER (OUTPATIENT)
Age: 49
Setting detail: OUTPATIENT SURGERY
Discharge: HOME | End: 2019-05-13
Attending: ANESTHESIOLOGY | Admitting: ANESTHESIOLOGY
Payer: COMMERCIAL

## 2019-05-13 ENCOUNTER — APPOINTMENT (OUTPATIENT)
Dept: GENERAL RADIOLOGY | Age: 49
End: 2019-05-13
Attending: ANESTHESIOLOGY
Payer: COMMERCIAL

## 2019-05-13 VITALS
HEIGHT: 65 IN | TEMPERATURE: 96.8 F | DIASTOLIC BLOOD PRESSURE: 80 MMHG | HEART RATE: 63 BPM | RESPIRATION RATE: 16 BRPM | OXYGEN SATURATION: 100 % | BODY MASS INDEX: 48.82 KG/M2 | SYSTOLIC BLOOD PRESSURE: 119 MMHG | WEIGHT: 293 LBS

## 2019-05-13 LAB — GLUCOSE BLD-MCNC: 79 MG/DL (ref 65–105)

## 2019-05-13 PROCEDURE — 2709999900 HC NON-CHARGEABLE SUPPLY: Performed by: ANESTHESIOLOGY

## 2019-05-13 PROCEDURE — 3209999900 FLUORO FOR SURGICAL PROCEDURES

## 2019-05-13 PROCEDURE — 77002 NEEDLE LOCALIZATION BY XRAY: CPT | Performed by: ANESTHESIOLOGY

## 2019-05-13 PROCEDURE — 6360000002 HC RX W HCPCS: Performed by: ANESTHESIOLOGY

## 2019-05-13 PROCEDURE — 3600000050 HC PAIN LEVEL 1 BASE: Performed by: ANESTHESIOLOGY

## 2019-05-13 PROCEDURE — 82947 ASSAY GLUCOSE BLOOD QUANT: CPT

## 2019-05-13 PROCEDURE — 7100000011 HC PHASE II RECOVERY - ADDTL 15 MIN: Performed by: ANESTHESIOLOGY

## 2019-05-13 PROCEDURE — 7100000010 HC PHASE II RECOVERY - FIRST 15 MIN: Performed by: ANESTHESIOLOGY

## 2019-05-13 PROCEDURE — 20610 DRAIN/INJ JOINT/BURSA W/O US: CPT | Performed by: ANESTHESIOLOGY

## 2019-05-13 PROCEDURE — 6360000004 HC RX CONTRAST MEDICATION: Performed by: ANESTHESIOLOGY

## 2019-05-13 RX ORDER — SODIUM CHLORIDE 0.9 % (FLUSH) 0.9 %
10 SYRINGE (ML) INJECTION PRN
Status: DISCONTINUED | OUTPATIENT
Start: 2019-05-13 | End: 2019-05-13

## 2019-05-13 RX ORDER — SODIUM CHLORIDE 0.9 % (FLUSH) 0.9 %
10 SYRINGE (ML) INJECTION EVERY 12 HOURS SCHEDULED
Status: DISCONTINUED | OUTPATIENT
Start: 2019-05-13 | End: 2019-05-13

## 2019-05-13 ASSESSMENT — PAIN - FUNCTIONAL ASSESSMENT: PAIN_FUNCTIONAL_ASSESSMENT: 0-10

## 2019-05-13 ASSESSMENT — PAIN SCALES - GENERAL
PAINLEVEL_OUTOF10: 0
PAINLEVEL_OUTOF10: 2

## 2019-05-13 ASSESSMENT — PAIN DESCRIPTION - DESCRIPTORS: DESCRIPTORS: ACHING;DULL

## 2019-05-13 NOTE — OP NOTE
Preoperative Diagnosis: Osteoarthritis of the Bilateral knee. Postoperative Diagnosis: Osteoarthritis of the Bilateral knee. Procedure Performed:  Injection of Bilateral knee with FLOROSCOPY Guidance. Indication for the Procedure: The patient has Bilateral knee pain not responding to conservative treatment diagnosed with Knee OA  The procedure and the risks were discussed with the patient and an informed consent was obtained. Procedure: The patient is placed in supine/sitting position. Skin over the Bilateral knee was prepped with Betadine and draped in sterile manner. Using florosscopy Guidance skin entry point was marked along joint margin    Then skin and deep tissues lateral to the patellar tendon just above the tibial plateau were infiltrated with 2  ml of 1% lidocaine. The #22-gauge  spinal needle was introduced through the skin wheal. Then the needle was advanced with US guidance into the  Knee joint. Finally 2 ml of treatment solution containing SYNVISC were injected into the joint. The needle is removed and a Band-Aid was placed over the needle insertion site. The procedure was first performed on the right side and was then repeated on the left side at the same technique    Patient tolerated the procedure well and the vital signs remained stable. Patient will be seen in the pain clinic for follow up and further planning.     Electronically signed by David Bettencourt MD on 5/13/2019 at 10:39 AM

## 2019-05-13 NOTE — H&P
History and Physical Update    Pt Name: Nichole Lynch  MRN: [id number]  YOB: 1970  Date of evaluation: 5/13/2019      [x] I have reviewed the progress note by Dr. Odin Hogue on 4/18/19 which meets the criteria for an Interval History and Physical note and is attached below. [x] I have examined  Alma Delia Goldsmith  There are no changes to the patient who is scheduled for a Synvisc injection bilateral knees by Dr. Odin Hogue. The patient denies health changes, fever, chills, productive cough, SOB, skin changes or chest pain. Vital signs: /89   Pulse 79   Temp 97.5 °F (36.4 °C) (Oral)   Resp 16   Ht 5' 5\" (1.651 m)   Wt (!) 351 lb (159.2 kg)   SpO2 99%   BMI 58.41 kg/m²     Allergies:  Lisinopril    Medications:    Prior to Admission medications    Medication Sig Start Date End Date Taking? Authorizing Provider   diclofenac sodium (VOLTAREN) 1 % GEL Apply 4 g topically 4 times daily 4/18/19 5/18/19 Yes Ruben Mera MD   gabapentin (NEURONTIN) 300 MG capsule Take 1 capsule by mouth. Stan Agosto Historical Provider, MD   fluticasone (FLOVENT HFA) 110 MCG/ACT inhaler Inhale 1 puff into the lungs 2 times daily 4/3/18 5/13/19 Yes Mauricio Ruiz MD   albuterol sulfate  (90 Base) MCG/ACT inhaler Inhale 2 puffs into the lungs every 6 hours as needed for Wheezing 2/2/18  Yes SINTIA Britton - CNP   ARIPiprazole (ABILIFY) 15 MG tablet Take 15 mg by mouth nightly    Yes Historical Provider, MD   traZODone (DESYREL) 50 MG tablet Take 50 mg by mouth nightly   Yes Historical Provider, MD   pravastatin (PRAVACHOL) 80 MG tablet Take 80 mg by mouth nightly    Yes Historical Provider, MD   oxybutynin (DITROPAN-XL) 10 MG extended release tablet Take 10 mg by mouth daily   Yes Historical Provider, MD   potassium chloride (MICRO-K) 10 MEQ CR capsule Take 10 mEq by mouth daily.    Yes Historical Provider, MD   valsartan (DIOVAN) 40 MG tablet Take 40 mg by mouth nightly Patient takes at night   Yes Historical Provider, MD metFORMIN (GLUCOPHAGE) 500 MG tablet Take 500 mg by mouth daily (with breakfast). Yes Historical Provider, MD   hydrochlorothiazide (MICROZIDE) 12.5 MG capsule Take 12.5 mg by mouth nightly    Yes Historical Provider, MD   amLODIPine (NORVASC) 10 MG tablet Take 10 mg by mouth nightly Patient takes at night   Yes Historical Provider, MD   simvastatin (ZOCOR) 20 MG tablet Take 80 mg by mouth nightly. Yes Historical Provider, MD   Elastic Bandages & Supports (KNEE BRACE ADJUSTABLE HINGED) MISC Use as directed 3/27/19   Marta Webber MD   Elastic Bandages & Supports (KNEE BRACE) MISC 2 Units by Does not apply route daily 3/7/19 3/6/20  Marta Webber MD   ibuprofen (ADVIL;MOTRIN) 800 MG tablet Take 1 tablet by mouth every 6 hours as needed for Pain 72/41/87   Ananth Prater DO   DICLOFENAC POTASSIUM PO Take 50 mg by mouth 3 times daily as needed    Historical Provider, MD       This is a 51 y.o. female who is pleasant, cooperative, alert and oriented x3, in no acute distress. Heart: Heart sounds are normal.  HR regular rate and rhythm without murmur, gallop or rub. Lungs: Normal respiratory effort with good air exchange and clear to auscultation without wheezes or rales bilaterally   Abdomen: soft, nontender, nondistended with bowel sounds . HARVEY Wahl  Electronically signed 5/13/2019 at 9:10 AM        Marta Webber MD   Physician   Pain Management   Progress Notes      Addendum   Encounter Date:  4/18/2019          Related encounter: Office Visit from 4/18/2019 in [facility] [place]         Expand All Collapse All           Show:Clear all  [x]Manual[x]Template[]Copied    Added by:  Taylor Hernandes MD      []Sera for details      Subjective:      Patient ID: Damion Nguyen is a 51 y.o. female.      HPI          Chief Complaint   Patient presents with    Follow Up After Procedure    Knee Pain      This is a 76-year-old woman with past medical history significant for morbid obesity, BMI more than 58, history of psych trick disorder     She seen in our clinic for chronic bilateral knee pain  She's diagnosed with primary knee osteoarthritis  Patient of tried conservative measures and therapy in past  In past she had received knee injections with orthopedic clinic and find it helpful  She is not considered a surgical candidate because of morbid obesity     Pain if and involves both knees, describes the knee pain as constant aching pain sensation address that becomes sharp and severe with ambulation  Average pain score 5-6/10 with walking and standing     Patient had a recent knee steroid injection  Today she reported no significant improvement in her knee pain  She has been participating in physical therapy attending twice a week  She has been using knee brace  She has been using topical anti-inflammatory medication prescribed on last evaluation     Pain is significantly affecting daily life activities     Patient recalls that in past she had knee viscose supplementation with good outcome   Is requesting opioid pain medications              Lab Results   Component Value Date     LABA1C 6.4 01/15/2018      No results found for: EAG     Review of Systems   Constitutional: Negative. HENT: Negative. Respiratory: Negative. Asthma    Cardiovascular: Negative. Musculoskeletal: Positive for joint swelling (knee pain ). Neurological: Negative. Hematological: Negative. Psychiatric/Behavioral: Positive for dysphoric mood (on meds ).       Past Medical History        Past Medical History:   Diagnosis Date    Asthma      Astigmatism, regular 4/12/2017    Bronchitis 2009    Chronic back pain      Depression 5/1/2014    Diabetes mellitus ([facility] [address]       Type II    Frequency of urination      Hyperlipidemia      Hypertension      Increased frequency of urination 8/27/2018    Myopia 4/12/2017    OAG (open angle glaucoma) suspect, low risk 5/4/2017    Obesity      Palpitations       occasional    Schizophrenia ([facility] [address]      Sleep apnea since Oct. 2017     CPAP nightly    Urge incontinence of urine 8/27/2018    Wears glasses           Past Surgical History         Past Surgical History:   Procedure Laterality Date    DILATION AND CURETTAGE OF UTERUS N/A 11/14/2017     OPERATIVE HYSTEROSCOPY WITH MYOSURE, EXCISION ENDOMETRIAL POLYPS performed by Emil Quick MD at [address] NERV Bilateral 3/25/2019     BILATERAL KNEE FLORO GUIDED STEROID INJECTION performed by Celina Roa MD at [facility] HYSTEROSCOPY   11/14/2017     exc endometrial polyps    OTHER SURGICAL HISTORY Right 10/17/14     excision dorsal exostosis rt foot               Allergies   Allergen Reactions    Lisinopril         Makes her cough      Current Facility-Administered Medications          Current Outpatient Medications   Medication Sig Dispense Refill    diclofenac sodium (VOLTAREN) 1 % GEL Apply 4 g topically 4 times daily 4 Tube 1    Elastic Bandages & Supports (KNEE BRACE ADJUSTABLE HINGED) MISC Use as directed 1 each 0    Elastic Bandages & Supports (KNEE BRACE) MISC 2 Units by Does not apply route daily 2 each 0    gabapentin (NEURONTIN) 300 MG capsule Take 1 capsule by mouth. .        albuterol sulfate  (90 Base) MCG/ACT inhaler Inhale 2 puffs into the lungs every 6 hours as needed for Wheezing 1 Inhaler 3    ibuprofen (ADVIL;MOTRIN) 800 MG tablet Take 1 tablet by mouth every 6 hours as needed for Pain 30 tablet 0    DICLOFENAC POTASSIUM PO Take 50 mg by mouth 3 times daily as needed        ARIPiprazole (ABILIFY) 15 MG tablet Take 15 mg by mouth nightly         traZODone (DESYREL) 50 MG tablet Take 50 mg by mouth nightly        pravastatin (PRAVACHOL) 80 MG tablet Take 80 mg by mouth nightly         oxybutynin (DITROPAN-XL) 10 MG extended release tablet Take 10 mg by mouth daily        potassium chloride (MICRO-K) 10 MEQ CR capsule Take 10 mEq by mouth daily.  valsartan (DIOVAN) 40 MG tablet Take 40 mg by mouth nightly Patient takes at night        metFORMIN (GLUCOPHAGE) 500 MG tablet Take 500 mg by mouth daily (with breakfast).  hydrochlorothiazide (MICROZIDE) 12.5 MG capsule Take 12.5 mg by mouth nightly         amLODIPine (NORVASC) 10 MG tablet Take 10 mg by mouth nightly Patient takes at night        simvastatin (ZOCOR) 20 MG tablet Take 80 mg by mouth nightly.  fluticasone (FLOVENT HFA) 110 MCG/ACT inhaler Inhale 1 puff into the lungs 2 times daily 1 Inhaler 11      No current facility-administered medications for this visit.              Social History   Social History            Socioeconomic History    Marital status: Single       Spouse name: None    Number of children: 0    Years of education: None    Highest education level: None   Occupational History    None   Social Needs    Financial resource strain: None    Food insecurity:       Worry: None       Inability: None    Transportation needs:       Medical: None       Non-medical: None   Tobacco Use    Smoking status: Passive Smoke Exposure - Never Smoker    Smokeless tobacco: Never Used    Tobacco comment: associates smoking   Substance and Sexual Activity    Alcohol use: No    Drug use: No    Sexual activity: Never   Lifestyle    Physical activity:       Days per week: None       Minutes per session: None    Stress: None   Relationships    Social connections:       Talks on phone: None       Gets together: None       Attends Buddhism service: None       Active member of club or organization: None       Attends meetings of clubs or organizations: None       Relationship status: None    Intimate partner violence:       Fear of current or ex partner: None       Emotionally abused: None       Physically abused: None       Forced sexual activity: None   Other Topics Concern    None   Social History Narrative    None         Family History         Family History   Problem Relation Age of Onset    Diabetes Mother      Heart Failure Mother      Diabetes Maternal Grandmother      Heart Failure Maternal Grandmother      No Known Problems Maternal Grandfather      No Known Problems Paternal Grandmother      No Known Problems Paternal Grandfather      Cancer Paternal Aunt                 Objective:   Physical Exam   Constitutional: She is oriented to person, place, and time. Morbidly obese   Musculoskeletal:        Right knee: She exhibits decreased range of motion. Tenderness found. Left knee: She exhibits decreased range of motion. Tenderness found. Neurological: She is alert and oriented to person, place, and time. Psychiatric: She has a normal mood and affect. Her behavior is normal.   Nursing note and vitals reviewed. Assessment:      1. Primary osteoarthritis of both knees    2.  Morbid obesity with BMI of 50.0-59.9, adult ([facility] [address]                        Plan:      Chronic bilateral knee pain, diagnosed with knee arthritis  Failed conservative measures  Failed physical therapy  Tried oral and topical anti-inflammatory medications line tried knee bracing  Tried knee steroid injection with no benefit  I think patient is meeting the criteria for knee viscose supplementation        she is not a candidate for opioid considering morbid obesity associated increased risk of respiratory depression with use of opioids  Risk of opioid discussed in detail with patient              Orders Placed This Encounter   Procedures    IL ARTHROCENTESIS ASPIR&/INJ MAJOR JT/BURSA W/O US       Knee Synvisc injection serres of 3 fluoro guided       Standing Status:   Future       Standing Expiration Date:   7/18/2019      Encounter Medications         Orders Placed This Encounter   Medications    diclofenac sodium (VOLTAREN) 1 % GEL       Sig: Apply 4 g topically 4 times daily       Dispense:  4 Tube       Refill:  1         r  Controlled

## 2019-05-20 ENCOUNTER — APPOINTMENT (OUTPATIENT)
Dept: GENERAL RADIOLOGY | Age: 49
End: 2019-05-20
Attending: ANESTHESIOLOGY
Payer: COMMERCIAL

## 2019-05-20 ENCOUNTER — HOSPITAL ENCOUNTER (OUTPATIENT)
Age: 49
Setting detail: OUTPATIENT SURGERY
Discharge: HOME | End: 2019-05-20
Attending: ANESTHESIOLOGY | Admitting: ANESTHESIOLOGY
Payer: COMMERCIAL

## 2019-05-20 VITALS
TEMPERATURE: 96.8 F | WEIGHT: 293 LBS | BODY MASS INDEX: 48.82 KG/M2 | RESPIRATION RATE: 22 BRPM | SYSTOLIC BLOOD PRESSURE: 138 MMHG | HEART RATE: 90 BPM | DIASTOLIC BLOOD PRESSURE: 72 MMHG | OXYGEN SATURATION: 100 % | HEIGHT: 65 IN

## 2019-05-20 PROCEDURE — 6360000002 HC RX W HCPCS: Performed by: ANESTHESIOLOGY

## 2019-05-20 PROCEDURE — 3209999900 FLUORO FOR SURGICAL PROCEDURES

## 2019-05-20 PROCEDURE — 77002 NEEDLE LOCALIZATION BY XRAY: CPT | Performed by: ANESTHESIOLOGY

## 2019-05-20 PROCEDURE — 7100000011 HC PHASE II RECOVERY - ADDTL 15 MIN: Performed by: ANESTHESIOLOGY

## 2019-05-20 PROCEDURE — 7100000010 HC PHASE II RECOVERY - FIRST 15 MIN: Performed by: ANESTHESIOLOGY

## 2019-05-20 PROCEDURE — 2709999900 HC NON-CHARGEABLE SUPPLY: Performed by: ANESTHESIOLOGY

## 2019-05-20 PROCEDURE — 3600000050 HC PAIN LEVEL 1 BASE: Performed by: ANESTHESIOLOGY

## 2019-05-20 PROCEDURE — 20610 DRAIN/INJ JOINT/BURSA W/O US: CPT | Performed by: ANESTHESIOLOGY

## 2019-05-20 RX ORDER — SODIUM CHLORIDE 0.9 % (FLUSH) 0.9 %
10 SYRINGE (ML) INJECTION EVERY 12 HOURS SCHEDULED
Status: DISCONTINUED | OUTPATIENT
Start: 2019-05-20 | End: 2019-05-20 | Stop reason: HOSPADM

## 2019-05-20 RX ORDER — SODIUM CHLORIDE 0.9 % (FLUSH) 0.9 %
10 SYRINGE (ML) INJECTION PRN
Status: DISCONTINUED | OUTPATIENT
Start: 2019-05-20 | End: 2019-05-20 | Stop reason: HOSPADM

## 2019-05-20 ASSESSMENT — PAIN SCALES - GENERAL
PAINLEVEL_OUTOF10: 0
PAINLEVEL_OUTOF10: 2
PAINLEVEL_OUTOF10: 2

## 2019-05-20 ASSESSMENT — PAIN - FUNCTIONAL ASSESSMENT: PAIN_FUNCTIONAL_ASSESSMENT: 0-10

## 2019-05-20 NOTE — H&P
10/17/14    excision dorsal exostosis rt foot        Medications Prior to Admission:     Prior to Admission medications    Medication Sig Start Date End Date Taking? Authorizing Provider   diclofenac sodium (VOLTAREN) 1 % GEL Apply 4 g topically 4 times daily 4/18/19 5/20/19 Yes Arie Guillory MD   gabapentin (NEURONTIN) 300 MG capsule Take 1 capsule by mouth. .   Yes Historical Provider, MD   DICLOFENAC POTASSIUM PO Take 50 mg by mouth 3 times daily as needed   Yes Historical Provider, MD   ARIPiprazole (ABILIFY) 15 MG tablet Take 15 mg by mouth nightly    Yes Historical Provider, MD   traZODone (DESYREL) 50 MG tablet Take 50 mg by mouth nightly   Yes Historical Provider, MD   pravastatin (PRAVACHOL) 80 MG tablet Take 80 mg by mouth nightly    Yes Historical Provider, MD   oxybutynin (DITROPAN-XL) 10 MG extended release tablet Take 10 mg by mouth daily   Yes Historical Provider, MD   potassium chloride (MICRO-K) 10 MEQ CR capsule Take 10 mEq by mouth daily. Yes Historical Provider, MD   valsartan (DIOVAN) 40 MG tablet Take 40 mg by mouth nightly Patient takes at night   Yes Historical Provider, MD   metFORMIN (GLUCOPHAGE) 500 MG tablet Take 500 mg by mouth daily (with breakfast). Yes Historical Provider, MD   hydrochlorothiazide (MICROZIDE) 12.5 MG capsule Take 12.5 mg by mouth nightly    Yes Historical Provider, MD   simvastatin (ZOCOR) 20 MG tablet Take 80 mg by mouth nightly.    Yes Historical Provider, MD   Elastic Bandages & Supports (KNEE BRACE ADJUSTABLE HINGED) MISC Use as directed 3/27/19   Arie Guillory MD   Elastic Bandages & Supports (KNEE BRACE) MISC 2 Units by Does not apply route daily 3/7/19 3/6/20  Arie Guillory MD   fluticasone (FLOVENT HFA) 110 MCG/ACT inhaler Inhale 1 puff into the lungs 2 times daily 4/3/18 5/13/19  Sandy Meng MD   albuterol sulfate  (90 Base) MCG/ACT inhaler Inhale 2 puffs into the lungs every 6 hours as needed for Wheezing 2/2/18   Aleksandr Harris, SINTIA - CNP ibuprofen (ADVIL;MOTRIN) 800 MG tablet Take 1 tablet by mouth every 6 hours as needed for Pain 70/03/76   Arnold Bowmanas,    amLODIPine (NORVASC) 10 MG tablet Take 10 mg by mouth nightly Patient takes at night    Historical Provider, MD        Allergies:     Lisinopril    Social History:     Tobacco:    reports that she is a non-smoker but has been exposed to tobacco smoke. She has never used smokeless tobacco.  Alcohol:      reports that she does not drink alcohol. Drug Use:  reports that she does not use drugs. Family History:     Family History   Problem Relation Age of Onset    Diabetes Mother     Heart Failure Mother     Diabetes Maternal Grandmother     Heart Failure Maternal Grandmother     No Known Problems Maternal Grandfather     No Known Problems Paternal Grandmother     No Known Problems Paternal Grandfather     Cancer Paternal Aunt        Review of Systems:     Positive and Negative as described in HPI. CONSTITUTIONAL:  negative for fevers, chills, sweats, fatigue, weight loss  HEENT:  negative for vision, hearing changes, runny nose, throat pain  RESPIRATORY:  negative for shortness of breath, cough, congestion, wheezing. CARDIOVASCULAR:  negative for chest pain, palpitations.   GASTROINTESTINAL:  negative for nausea, vomiting, diarrhea, constipation, change in bowel habits, abdominal pain   GENITOURINARY:  negative for difficulty of urination, burning with urination, frequency   INTEGUMENT:  negative for rash, skin lesions, easy bruising   HEMATOLOGIC/LYMPHATIC:  negative for swelling/edema   ALLERGIC/IMMUNOLOGIC:  negative for urticaria , itching  ENDOCRINE:  negative increase in drinking, increase in urination, hot or cold intolerance  MUSCULOSKELETAL:  negative joint pains, muscle aches, swelling of joints  NEUROLOGICAL:  negative for headaches, dizziness, lightheadedness, numbness, pain, tingling extremities  BEHAVIOR/PSYCH:  negative for depression, anxiety    Physical Exam: BP (!) 148/73   Pulse 67   Temp 97.9 °F (36.6 °C)   Resp 20   Ht 5' 5\" (1.651 m)   Wt (!) 351 lb (159.2 kg)   LMP  (LMP Unknown) Comment: last period was before 2018  SpO2 98%   BMI 58.41 kg/m²   No LMP recorded (lmp unknown). Patient is postmenopausal.    No results for input(s): POCGLU in the last 72 hours. General Appearance:  alert, well appearing, and in no acute distress  Mental status: oriented to person, place, and time with normal affect  Head:  normocephalic, atraumatic. Eye: no icterus, redness, pupils equal and reactive, extraocular eye movements intact, conjunctiva clear  Ear: normal external ear, no discharge, hearing intact  Nose:  no drainage noted  Mouth: mucous membranes moist  Neck: supple, no carotid bruits, thyroid not palpable  Lungs: Bilateral equal air entry, clear to ausculation, no wheezing, rales or rhonchi, normal effort  Cardiovascular: normal rate, regular rhythm, no murmur, gallop, rub. Abdomen: Soft, nontender, nondistended, normal bowel sounds, no hepatomegaly or splenomegaly  Neurologic: There are no new focal motor or sensory deficits, normal muscle tone and bulk, no abnormal sensation, normal speech, cranial nerves II through XII grossly intact  Skin: No gross lesions, rashes, bruising or bleeding on exposed skin area  Extremities:  peripheral pulses palpable, no pedal edema or calf pain with palpation  Psych: normal affect     Investigations:      Laboratory Testing:  No results found for this or any previous visit (from the past 24 hour(s)). No results for input(s): HGB, HCT, WBC, MCV, PLATELET, NA, K, CL, CO2, BUN, CREATININE, GLUCOSE, INR, PROTIME, APTT, AST, ALT, LABALBU, HCG in the last 720 hours. Imaging/Diagnostics:        Diagnosis:      1. Primary osteoarthritis bilateral knees    Plans:     1.  Injection synvisc fluoro guided bilateral knees      SINTIA Venegas CNP  2019  8:54 AM

## 2019-05-22 ENCOUNTER — HOSPITAL ENCOUNTER (OUTPATIENT)
Age: 49
Setting detail: SPECIMEN
Discharge: HOME | End: 2019-05-22
Payer: COMMERCIAL

## 2019-05-23 LAB — TSH SERPL DL<=0.05 MIU/L-ACNC: 1.11 MIU/L (ref 0.3–5)

## 2019-06-10 ENCOUNTER — HOSPITAL ENCOUNTER (OUTPATIENT)
Age: 49
Setting detail: OUTPATIENT SURGERY
Discharge: HOME | End: 2019-06-10
Attending: ANESTHESIOLOGY | Admitting: ANESTHESIOLOGY
Payer: COMMERCIAL

## 2019-06-10 ENCOUNTER — APPOINTMENT (OUTPATIENT)
Dept: GENERAL RADIOLOGY | Age: 49
End: 2019-06-10
Attending: ANESTHESIOLOGY
Payer: COMMERCIAL

## 2019-06-10 VITALS
HEIGHT: 65 IN | OXYGEN SATURATION: 99 % | SYSTOLIC BLOOD PRESSURE: 125 MMHG | HEART RATE: 95 BPM | DIASTOLIC BLOOD PRESSURE: 68 MMHG | WEIGHT: 293 LBS | RESPIRATION RATE: 15 BRPM | TEMPERATURE: 97 F | BODY MASS INDEX: 48.82 KG/M2

## 2019-06-10 LAB — GLUCOSE BLD-MCNC: 162 MG/DL (ref 65–105)

## 2019-06-10 PROCEDURE — 3209999900 FLUORO FOR SURGICAL PROCEDURES

## 2019-06-10 PROCEDURE — 3600000050 HC PAIN LEVEL 1 BASE: Performed by: ANESTHESIOLOGY

## 2019-06-10 PROCEDURE — 6360000004 HC RX CONTRAST MEDICATION: Performed by: ANESTHESIOLOGY

## 2019-06-10 PROCEDURE — 7100000011 HC PHASE II RECOVERY - ADDTL 15 MIN: Performed by: ANESTHESIOLOGY

## 2019-06-10 PROCEDURE — 6360000002 HC RX W HCPCS: Performed by: ANESTHESIOLOGY

## 2019-06-10 PROCEDURE — 2709999900 HC NON-CHARGEABLE SUPPLY: Performed by: ANESTHESIOLOGY

## 2019-06-10 PROCEDURE — 7100000010 HC PHASE II RECOVERY - FIRST 15 MIN: Performed by: ANESTHESIOLOGY

## 2019-06-10 PROCEDURE — 82947 ASSAY GLUCOSE BLOOD QUANT: CPT

## 2019-06-10 PROCEDURE — 77002 NEEDLE LOCALIZATION BY XRAY: CPT | Performed by: ANESTHESIOLOGY

## 2019-06-10 PROCEDURE — 20610 DRAIN/INJ JOINT/BURSA W/O US: CPT | Performed by: ANESTHESIOLOGY

## 2019-06-10 RX ORDER — SODIUM CHLORIDE 0.9 % (FLUSH) 0.9 %
10 SYRINGE (ML) INJECTION PRN
Status: DISCONTINUED | OUTPATIENT
Start: 2019-06-10 | End: 2019-06-10

## 2019-06-10 RX ORDER — SODIUM CHLORIDE 0.9 % (FLUSH) 0.9 %
10 SYRINGE (ML) INJECTION EVERY 12 HOURS SCHEDULED
Status: DISCONTINUED | OUTPATIENT
Start: 2019-06-10 | End: 2019-06-10

## 2019-06-10 ASSESSMENT — PAIN - FUNCTIONAL ASSESSMENT
PAIN_FUNCTIONAL_ASSESSMENT: ACTIVITIES ARE NOT PREVENTED
PAIN_FUNCTIONAL_ASSESSMENT: 0-10

## 2019-06-10 ASSESSMENT — PAIN DESCRIPTION - PAIN TYPE: TYPE: CHRONIC PAIN;SURGICAL PAIN

## 2019-06-10 ASSESSMENT — PAIN SCALES - GENERAL: PAINLEVEL_OUTOF10: 1

## 2019-06-10 ASSESSMENT — PAIN DESCRIPTION - LOCATION: LOCATION: KNEE

## 2019-06-10 ASSESSMENT — PAIN DESCRIPTION - DESCRIPTORS
DESCRIPTORS: SORE
DESCRIPTORS: SORE

## 2019-06-10 ASSESSMENT — PAIN DESCRIPTION - ORIENTATION: ORIENTATION: RIGHT;LEFT

## 2019-06-10 NOTE — H&P
History and Physical Service   [place] [address]    HISTORY AND PHYSICAL EXAMINATION            Date of Evaluation: 6/10/2019  Patient name:  Oksana Frederick  MRN:   [id number]  YOB: 1970  PCP:    Jovon Hickman    History Obtained From:     Patient, medical records    History of Present Illness: This is Oksana Frederick a 51 y.o. female who presents today for bilateral knee Synvisc injections by Dr. Nuzhat Silva. The patient has had a long history of chronic bilateral knee pain due to progressive O/A. She is returning for a continuation of the Synvisc series of injections with the last injection 5/13/19. She does suffer from morbid obesity as well as sleep apnea and well controlled asthma she reports. She reports having a history of schizophrenia that is well controlled with no recent delusions or hallucinations.       Past Medical History:     Past Medical History:   Diagnosis Date    Asthma     Astigmatism, regular 4/12/2017    Bronchitis 2009    Chronic back pain     Depression 5/1/2014    Diabetes mellitus ([address]     Type II    Frequency of urination     Hyperlipidemia     Hypertension     Increased frequency of urination 8/27/2018    Myopia 4/12/2017    OAG (open angle glaucoma) suspect, low risk 5/4/2017    Obesity     Palpitations     occasional    Schizophrenia ([address]     Sleep apnea since Oct. 2017    CPAP nightly    Urge incontinence of urine 8/27/2018    Wears glasses         Past Surgical History:     Past Surgical History:   Procedure Laterality Date    DILATION AND CURETTAGE OF UTERUS N/A 11/14/2017    OPERATIVE HYSTEROSCOPY WITH MYOSURE, EXCISION ENDOMETRIAL POLYPS performed by Kg Ceballos MD at [address] NERV Bilateral 3/25/2019    BILATERAL KNEE FLORO GUIDED STEROID INJECTION performed by Yesika Valenzuela MD at [address] NERV Bilateral 5/13/2019    SYNVISC  MEDICATION INJECTION   FLUORO GUIDED  FABIOLA KNEES performed by Lynn Gomez MD at [address] [facility] NERV Bilateral 5/20/2019    INJECTION SYNVISC  FLUORO GUIDED FABIOLA KNEES performed by Lynn Gomez MD at [address] HYSTEROSCOPY  11/14/2017    exc endometrial polyps    OTHER SURGICAL HISTORY Right 10/17/14    excision dorsal exostosis rt foot        Medications Prior to Admission:     Prior to Admission medications    Medication Sig Start Date End Date Taking? Authorizing Provider   diclofenac sodium (VOLTAREN) 1 % GEL Apply 4 g topically 4 times daily 4/18/19 5/20/19  Lynn Gomez MD   Elastic Bandages & Supports (KNEE BRACE ADJUSTABLE HINGED) [facility] Use as directed 3/27/19   Lynn Gomez MD   Elastic Bandages & Supports (KNEE BRACE) [facility] 2 Units by Does not apply route daily 3/7/19 3/6/20  Lynn Gomez MD   gabapentin (NEURONTIN) 300 MG capsule Take 1 capsule by mouth. .    Historical Provider, MD   fluticasone (FLOVENT HFA) 110 MCG/ACT inhaler Inhale 1 puff into the lungs 2 times daily 4/3/18 5/13/19  Sarah Beth Kauffman MD   albuterol sulfate  (90 Base) MCG/ACT inhaler Inhale 2 puffs into the lungs every 6 hours as needed for Wheezing 2/2/18   Garima Godwin, APRN - CNP   ibuprofen (ADVIL;MOTRIN) 800 MG tablet Take 1 tablet by mouth every 6 hours as needed for Pain 16/37/42   Aristides Andrade DO   DICLOFENAC POTASSIUM PO Take 50 mg by mouth 3 times daily as needed    Historical Provider, MD   ARIPiprazole (ABILIFY) 15 MG tablet Take 15 mg by mouth nightly     Historical Provider, MD   traZODone (DESYREL) 50 MG tablet Take 50 mg by mouth nightly    Historical Provider, MD   pravastatin (PRAVACHOL) 80 MG tablet Take 80 mg by mouth nightly     Historical Provider, MD   oxybutynin (DITROPAN-XL) 10 MG extended release tablet Take 10 mg by mouth daily    Historical Provider, MD   potassium chloride (MICRO-K) 10 MEQ CR capsule Take 10 mEq by mouth daily.     Historical Provider, MD   valsartan (DIOVAN) 40 MG tablet Take 40 mg by mouth nightly intolerance  MUSCULOSKELETAL:  See HPI. negative joint pains, muscle aches, swelling of joints  NEUROLOGICAL:  negative for headaches, dizziness, lightheadedness, numbness, pain, tingling extremities  BEHAVIOR/PSYCH:  negative for depression, anxiety    Physical Exam:   /87   Pulse 91   Temp 97.7 °F (36.5 °C) (Oral)   Resp 18   LMP  (LMP Unknown) Comment: last period was before March 2018  SpO2 98%      General Appearance:  alert, well appearing, and in no acute distress  Mental status: oriented to person, place, and time with normal affect  Head:  normocephalic, atraumatic. Eye: no icterus, redness, pupils equal and reactive, extraocular eye movements intact, conjunctiva clear  Ear: normal external ear, no discharge, hearing intact  Nose:  no drainage noted  Mouth: mucous membranes moist  Neck: supple, no carotid bruits, thyroid not palpable  Lungs: Bilateral equal air entry, clear to ausculation, no wheezing, rales or rhonchi, normal effort  Cardiovascular: normal rate, regular rhythm, no murmur, gallop, rub.   Abdomen: Soft, nontender, nondistended, normal bowel sounds, no hepatomegaly or splenomegaly  Neurologic: There are no new focal motor or sensory deficits, normal muscle tone and bulk, no abnormal sensation, normal speech, cranial nerves II through XII grossly intact  Skin: No gross lesions, rashes, bruising or bleeding on exposed skin area  Extremities:  peripheral pulses palpable, no pedal edema or calf pain with palpation  Psych: normal affect         Diagnosis:      O/A bilateral knees    SINTIA Giullory CNP  6/10/2019  9:21 AM

## 2019-06-20 ENCOUNTER — OFFICE VISIT (OUTPATIENT)
Dept: PAIN MANAGEMENT | Age: 49
End: 2019-06-20
Payer: COMMERCIAL

## 2019-06-20 VITALS
DIASTOLIC BLOOD PRESSURE: 73 MMHG | BODY MASS INDEX: 48.82 KG/M2 | HEIGHT: 65 IN | RESPIRATION RATE: 16 BRPM | WEIGHT: 293 LBS | OXYGEN SATURATION: 99 % | SYSTOLIC BLOOD PRESSURE: 114 MMHG | HEART RATE: 82 BPM

## 2019-06-20 DIAGNOSIS — Z09 FOLLOW-UP EXAM AFTER TREATMENT: ICD-10-CM

## 2019-06-20 DIAGNOSIS — E66.01 MORBID OBESITY WITH BMI OF 50.0-59.9, ADULT (HCC): ICD-10-CM

## 2019-06-20 DIAGNOSIS — M17.0 PRIMARY OSTEOARTHRITIS OF BOTH KNEES: Primary | Chronic | ICD-10-CM

## 2019-06-20 PROCEDURE — G8417 CALC BMI ABV UP PARAM F/U: HCPCS | Performed by: ANESTHESIOLOGY

## 2019-06-20 PROCEDURE — G8427 DOCREV CUR MEDS BY ELIG CLIN: HCPCS | Performed by: ANESTHESIOLOGY

## 2019-06-20 PROCEDURE — 1036F TOBACCO NON-USER: CPT | Performed by: ANESTHESIOLOGY

## 2019-06-20 PROCEDURE — 99213 OFFICE O/P EST LOW 20 MIN: CPT | Performed by: ANESTHESIOLOGY

## 2019-06-20 ASSESSMENT — ENCOUNTER SYMPTOMS
BACK PAIN: 0
RESPIRATORY NEGATIVE: 1
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
## 2019-06-26 ENCOUNTER — HOSPITAL ENCOUNTER (OUTPATIENT)
Age: 49
Setting detail: SPECIMEN
Discharge: HOME | End: 2019-06-26
Payer: COMMERCIAL

## 2019-06-26 ENCOUNTER — OFFICE VISIT (OUTPATIENT)
Dept: OBGYN | Age: 49
End: 2019-06-26
Payer: COMMERCIAL

## 2019-06-26 VITALS
SYSTOLIC BLOOD PRESSURE: 140 MMHG | DIASTOLIC BLOOD PRESSURE: 86 MMHG | HEIGHT: 65 IN | WEIGHT: 293 LBS | HEART RATE: 75 BPM | BODY MASS INDEX: 48.82 KG/M2

## 2019-06-26 DIAGNOSIS — Z01.419 WELL WOMAN EXAM WITH ROUTINE GYNECOLOGICAL EXAM: ICD-10-CM

## 2019-06-26 PROCEDURE — G0101 CA SCREEN;PELVIC/BREAST EXAM: HCPCS | Performed by: OBSTETRICS & GYNECOLOGY

## 2019-06-26 PROCEDURE — 99213 OFFICE O/P EST LOW 20 MIN: CPT | Performed by: OBSTETRICS & GYNECOLOGY

## 2019-06-26 ASSESSMENT — PATIENT HEALTH QUESTIONNAIRE - PHQ9
SUM OF ALL RESPONSES TO PHQ QUESTIONS 1-9: 0
SUM OF ALL RESPONSES TO PHQ QUESTIONS 1-9: 0
1. LITTLE INTEREST OR PLEASURE IN DOING THINGS: 0
2. FEELING DOWN, DEPRESSED OR HOPELESS: 0
SUM OF ALL RESPONSES TO PHQ9 QUESTIONS 1 & 2: 0

## 2019-06-26 NOTE — PROGRESS NOTES
[facility] OB/GYN Annual Visit    Jade Lyon  2019                       Primary Care Physician: Luz Awad    CC:   Chief Complaint   Patient presents with    Gynecologic Exam         HPI: Jade Lyon is a 47 y.o. female [facility]    The patient was seen and examined. She is here for an annual visit. She denies any complaints. Her No LMP recorded (lmp unknown). Patient is postmenopausal.. Her bowel habits are regular. She denies any bloating. She denies dysuria. She denies urinary leaking. She denies vaginal discharge. She is not sexually active.     Depression Screen: Symptoms of decreased mood absent  Symptoms of anhedonia absent  **If either question is answered in a  positive fashion then complete the PHQ9 Scoring Evaluation and make the appropriate referral**    Review Of Systems (11 point):  CONSTITUTIONAL:  negative for  fevers, chills and sweats  EYES:  negative for  double vision, blurred vision and dry eyes  HEENT:  negative for  hearing loss, ear drainage and nasal congestion  RESPIRATORY:  negative for  dry cough, dyspnea and wheezing  CARDIOVASCULAR:  negative for  chest pain, palpitations, orthopnea  GASTROINTESTINAL:  negative for nausea, vomiting and change in bowel habits  GENITOURINARY:  negative for frequency, dysuria and urinary incontinence  INTEGUMENT/BREAST:  negative for rash, dryness and pruritus  MUSCULOSKELETAL:  negative for  myalgias, arthralgias and pain  NEUROLOGICAL:  negative for headaches, dizziness and seizures  BEHAVIOR/PSYCH:  negative for poor appetite, decreased sleep and decreased energy level    ________________________________________________________________________    GYNECOLOGICAL HISTORY:  Menarche: 3 yo  Menopause at NA   LMP 16     Sexually Active: No     STD History: No      Permanent Sterilization: No   Reversible Birth Control: No         Hormone Replacement Exposure: No        OBSTETRICAL HISTORY:  OB History    Para Term  AB Living   0 0 0 0 0 0   SAB TAB Ectopic Molar Multiple Live Births   0 0 0 0 0 0       PAST MEDICAL HISTORY:   has a past medical history of Asthma, Astigmatism, regular, Bronchitis, Chronic back pain, Depression, Diabetes mellitus ([address] Frequency of urination, Hyperlipidemia, Hypertension, Increased frequency of urination, Myopia, OAG (open angle glaucoma) suspect, low risk, Obesity, Palpitations, Schizophrenia ([address] Sleep apnea, Urge incontinence of urine, and Wears glasses. PAST SURGICAL HISTORY:   has a past surgical history that includes other surgical history (Right, 10/17/14); hysteroscopy (11/14/2017); Dilation and curettage of uterus (N/A, 11/14/2017); hc inject other perphrl nerv (Bilateral, 3/25/2019); hc inject other perphrl nerv (Bilateral, 5/13/2019); hc inject other perphrl nerv (Bilateral, 5/20/2019); other surgical history (Bilateral, 06/10/2019); and hc inject other perphrl nerv (Bilateral, 6/10/2019). ALLERGIES:  is allergic to lisinopril. MEDICATIONS:  Prior to Admission medications    Medication Sig Start Date End Date Taking? Authorizing Provider   Elastic Bandages & Supports (KNEE BRACE ADJUSTABLE HINGED) MISC Use as directed 3/27/19  Yes Pavan Harkins MD   Elastic Bandages & Supports (KNEE BRACE) MISC 2 Units by Does not apply route daily 3/7/19 3/6/20 Yes Pavan Harkins MD   gabapentin (NEURONTIN) 300 MG capsule Take 1 capsule by mouth. .   Yes Historical Provider, MD   albuterol sulfate  (90 Base) MCG/ACT inhaler Inhale 2 puffs into the lungs every 6 hours as needed for Wheezing 2/2/18  Yes Genaro All, APRN - CNP   ibuprofen (ADVIL;MOTRIN) 800 MG tablet Take 1 tablet by mouth every 6 hours as needed for Pain 11/14/17  Yes Hannah Humphreys DO   DICLOFENAC POTASSIUM PO Take 50 mg by mouth 3 times daily as needed   Yes Historical Provider, MD   ARIPiprazole (ABILIFY) 15 MG tablet Take 15 mg by mouth nightly    Yes Historical Provider, MD   traZODone (DESYREL) 50 MG tablet Take 50 mg by mouth nightly   Yes Historical Provider, MD   pravastatin (PRAVACHOL) 80 MG tablet Take 80 mg by mouth nightly    Yes Historical Provider, MD   oxybutynin (DITROPAN-XL) 10 MG extended release tablet Take 10 mg by mouth daily   Yes Historical Provider, MD   potassium chloride (MICRO-K) 10 MEQ CR capsule Take 10 mEq by mouth daily. Yes Historical Provider, MD   valsartan (DIOVAN) 40 MG tablet Take 40 mg by mouth nightly Patient takes at night   Yes Historical Provider, MD   metFORMIN (GLUCOPHAGE) 500 MG tablet Take 500 mg by mouth daily (with breakfast). Yes Historical Provider, MD   hydrochlorothiazide (MICROZIDE) 12.5 MG capsule Take 12.5 mg by mouth nightly    Yes Historical Provider, MD   amLODIPine (NORVASC) 10 MG tablet Take 10 mg by mouth nightly Patient takes at night   Yes Historical Provider, MD   simvastatin (ZOCOR) 20 MG tablet Take 80 mg by mouth nightly. Yes Historical Provider, MD   diclofenac sodium (VOLTAREN) 1 % GEL Apply 4 g topically 4 times daily 4/18/19 6/20/19  Aldo Fairchild MD   fluticasone [facility] HFA) 110 MCG/ACT inhaler Inhale 1 puff into the lungs 2 times daily 4/3/18 6/20/19  Juliana Smith MD       FAMILY HISTORY:  Family History of Breast, Ovarian, Colon or Uterine Cancer: No   family history includes Cancer in her paternal aunt; Diabetes in her maternal grandmother and mother; Heart Failure in her maternal grandmother and mother; No Known Problems in her maternal grandfather, paternal grandfather, and paternal grandmother. SOCIAL HISTORY:   reports that she is a non-smoker but has been exposed to tobacco smoke. She has never used smokeless tobacco. She reports that she does not drink alcohol or use drugs.     HEALTH MAINTENANCE:  Immunization status: stated as up to date, no records available    Date of Last Mammogram: January 2019 - patient reports normal, records not available   Date of Last Colonoscopy: discussed need to schedule  Date of Last Bone Density:N/A    VITALS:  Vitals:    06/26/19 1525   BP: (!) 140/86   Pulse: 75   Weight: (!) 358 lb (162.4 kg)   Height: 5' 5\" (1.651 m)                                                                                                                                                                       PHYSICAL EXAM:   General Appearance: Appears healthy. Alert; in no acute distress. Pleasant. Skin: Skin color, texture, turgor normal. No rashes or lesions. Lymphatic: No abnormally enlarged lymph nodes. HEENT: normocephalic and atraumatic, Thyroid normal to inspection and palpation  Respiratory: Normal expansion. Clear to auscultation. No rales, rhonchi, or wheezing. Cardiovascular: normal rate, normal S1 and S2, no gallops, intact distal pulses and no carotid bruits  Breast:  (Chest): normal appearance, no masses or tenderness  Abdomen: soft, non-tender, non-distended, no right upper quadrant tenderness and no CVA tenderness  Pelvic Exam: Patient declined internal exam  External genitalia: General appearance; normal, Hair distribution; normal, Lesions absent  Urinary system: urethral meatus normal  Rectal Exam: exam declined by patient  Musculoskeletal: no gross abnormalities  Extremities: non-tender BLE and non-edematous  Psych:  oriented to time, place and person     OMM EXAM:  The patient did not complain of a Chief complaint requiring OMM.   Chief Complaint:none    Structural Exam: No Interest      ASSESSMENT & PLAN:    Brady Jacob is a 47 y.o. female [facility]    Well Woman Exam   - Pap smear due 2020 per ASCCP guidelines   - Patient declined GC/C and pelvic exam at todays visit   - Blind vaginitis probe collected and sent   - Mammogram completed in January 2019 per patient   - Patient counseled that she needs to scheduled colonoscopy         Patient Active Problem List    Diagnosis Date Noted    Dysmenorrhea 09/06/2017     Priority: High    Menorrhagia 09/06/2017     Priority: High     TVUS ordered on 9/6/17  Patient counseled on recommendation for EMB, patient to consider   Follow up in 3 weeks       Genital warts 06/27/2014     Priority: Medium     Return for excision      PCOS (polycystic ovarian syndrome) 05/01/2014     Priority: Medium    Schizophrenia ([facility] 05/01/2014     Priority: Medium     On Abilify  Follows at [address] [place] [address] Depression 05/01/2014     Priority: Medium     On trazadone  Seen at [facility] SI/HI      Hypertension      Priority: Medium    Hyperlipidemia      Priority: Medium    Chronic back pain      Priority: Low    Morbid obesity with BMI of 50.0-59.9, adult ([facility])      Priority: Low    Primary osteoarthritis of both knees 03/25/2019    Increased frequency of urination 08/27/2018    Urge incontinence of urine 08/27/2018    Prediabetes 01/11/2018    Arthritis 01/11/2018    Bilateral chronic knee pain 01/11/2018    Abnormal uterine bleeding (AUB)     Endometrium, polyp     OAG (open angle glaucoma) suspect, low risk 05/04/2017    Astigmatism, regular 04/12/2017    Diabetes mellitus without complication [facility] 45/57/6533    Myopia 04/12/2017       Return in about 1 year (around 6/26/2020) for Annual Exam.  DME: [address] Sleep phone: [phone] fax: [phone]      Counseling Completed:    discussed need for repeat pap as per American Society for Colposcopy and Cervical Pathology guidelines. discussed need for mammograms every 1 year, If >42 yo and last mammogram was negative. discussed Calcium and Vitamin D dosing. discussed need for colonoscopy screening as well as onset for bone density testing. Adrianna Arroyo Hereditary Breast, Ovarian, Colon and Uterine Cancer screening discussed. Tobacco & Secondary smoke risks discussed; with recommendation for cessation and avoidance. Routine health maintenance per patients PCP discussed. Patient was seen with total face to face time of 15 minutes.  More than 50% of this visit was on counseling and education regarding the problems listed below and her options. She was also counseled on her preventative health maintenance recommendations and follow-up. Diagnosis Orders   1.  Well woman exam with routine gynecological exam          Micheline Alicea DO  Ob/Gyn Resident  [facility], [address]  6/26/2019, 3:31 PM

## 2019-06-27 ENCOUNTER — OFFICE VISIT (OUTPATIENT)
Dept: BARIATRICS/WEIGHT MGMT | Age: 49
End: 2019-06-27
Payer: COMMERCIAL

## 2019-06-27 VITALS
SYSTOLIC BLOOD PRESSURE: 132 MMHG | HEIGHT: 65 IN | HEART RATE: 72 BPM | RESPIRATION RATE: 20 BRPM | WEIGHT: 293 LBS | DIASTOLIC BLOOD PRESSURE: 80 MMHG | BODY MASS INDEX: 48.82 KG/M2

## 2019-06-27 DIAGNOSIS — I10 ESSENTIAL HYPERTENSION: ICD-10-CM

## 2019-06-27 DIAGNOSIS — E11.69 DIABETES MELLITUS TYPE 2 IN OBESE (HCC): Primary | ICD-10-CM

## 2019-06-27 DIAGNOSIS — G47.33 OBSTRUCTIVE SLEEP APNEA: ICD-10-CM

## 2019-06-27 DIAGNOSIS — E66.01 MORBID OBESITY (HCC): ICD-10-CM

## 2019-06-27 DIAGNOSIS — E66.9 DIABETES MELLITUS TYPE 2 IN OBESE (HCC): Primary | ICD-10-CM

## 2019-06-27 LAB
DIRECT EXAM: NORMAL
Lab: NORMAL
SPECIMEN DESCRIPTION: NORMAL

## 2019-06-27 PROCEDURE — 3046F HEMOGLOBIN A1C LEVEL >9.0%: CPT | Performed by: SURGERY

## 2019-06-27 PROCEDURE — 1036F TOBACCO NON-USER: CPT | Performed by: SURGERY

## 2019-06-27 PROCEDURE — G8427 DOCREV CUR MEDS BY ELIG CLIN: HCPCS | Performed by: SURGERY

## 2019-06-27 PROCEDURE — 2022F DILAT RTA XM EVC RTNOPTHY: CPT | Performed by: SURGERY

## 2019-06-27 PROCEDURE — G8417 CALC BMI ABV UP PARAM F/U: HCPCS | Performed by: SURGERY

## 2019-06-27 PROCEDURE — 3017F COLORECTAL CA SCREEN DOC REV: CPT | Performed by: SURGERY

## 2019-06-27 PROCEDURE — 99204 OFFICE O/P NEW MOD 45 MIN: CPT | Performed by: SURGERY

## 2019-06-27 NOTE — PROGRESS NOTES
Attending Physician Statement  I have discussed the care of Stephen Vogel, including pertinent history and exam findings,  with the resident. I have reviewed the key elements of all parts of the encounter with the resident. I agree with the assessment, plan and orders as documented by the resident.   (GE Modifier)

## 2019-06-30 NOTE — PROGRESS NOTES
[address] [place]  [address] [place] [address]  [address]  [address] [address]  Dept: [phone]    SURGICAL WEIGHT MANAGEMENT PROGRAM  PROGRESS NOTE INITIAL EVALUATION     Patient: Darcy Carvalho        Service Date: 6/27/2019      HPI:     Chief Complaint   Patient presents with    Bariatric, Initial Visit     waist: 66\"  neck:18\"   has CPAP-does not use    Weight Loss       The patient is a pleasant 47y.o. year old female  with morbid obesity, who stands Height: 5' 5\" (165.1 cm) tall with a weight of Weight: (!) 354 lb (160.6 kg) , resulting in a BMI of Body mass index is 58.91 kg/m². . The patient suffers from multiple co-morbidities as a result of morbid obesity, including: Hypertension, Hyperlipidemia and Depression. She has suffered from obesity for many years. She admits to schizophrenia. The patient denies  a history of myocardial infarction, deep vein thrombosis, pulmonary embolism, renal failure, hepatic failure, stroke and seizure. The patient has failed multiple attempts at non-surgical weight loss, and is now seeking surgical intervention to promote permanent and consistent weight loss. She  has chosen Sleeve Gastrectomy. She is well educated regarding it, as she has recently viewed our weight loss surgery informational seminar .      Medical History:  Past Medical History:   Diagnosis Date    Asthma     Astigmatism, regular 4/12/2017    Bronchitis 2009    Chronic back pain     Depression 5/1/2014    Diabetes mellitus ([address]     Type II    Frequency of urination     Hyperlipidemia     Hypertension     Increased frequency of urination 8/27/2018    Myopia 4/12/2017    OAG (open angle glaucoma) suspect, low risk 5/4/2017    Obesity     Palpitations     occasional    Schizophrenia ([address]     Sleep apnea since Oct. 2017    CPAP nightly    Urge incontinence of urine 8/27/2018    Wears glasses        Surgical History:  Past Surgical History:   Procedure Laterality Date    DILATION AND CURETTAGE OF UTERUS N/A 11/14/2017    OPERATIVE HYSTEROSCOPY WITH MYOSURE, EXCISION ENDOMETRIAL POLYPS performed by Tello Champagne MD at [address] [facility] [place], [facility]. INJECT OTHER PERPHRL NERV Bilateral 3/25/2019    BILATERAL KNEE FLORO GUIDED STEROID INJECTION performed by Sagrario Diaz MD at [address] [facility] [address] NERV Bilateral 5/13/2019    SYNVISC  MEDICATION INJECTION   FLUORO GUIDED  FABIOLA KNEES performed by Sagrario Diaz MD at [address] [facility] NERV Bilateral 5/20/2019    INJECTION SYNVISC  FLUORO GUIDED FABIOLA KNEES performed by Sagrario Diaz MD at [address] [facility] NERV Bilateral 6/10/2019    INJECTION SYNVISC FLUORO GUIDED FABIOLA KNEES performed by Sagrario Diaz MD at [address] [place] [address]  11/14/2017    exc endometrial polyps    OTHER SURGICAL HISTORY Right 10/17/14    excision dorsal exostosis rt foot    OTHER SURGICAL HISTORY Bilateral 06/10/2019    INJECTION SYNVISC FLUORO GUIDED FABIOLA KNEES (Bilateral )       Family History:      Problem Relation Age of Onset    Diabetes Mother     Heart Failure Mother     Diabetes Maternal Grandmother     Heart Failure Maternal Grandmother     No Known Problems Maternal Grandfather     No Known Problems Paternal Grandmother     No Known Problems Paternal Grandfather     Cancer Paternal Aunt        Social History:   Social History     Tobacco Use    Smoking status: Passive Smoke Exposure - Never Smoker    Smokeless tobacco: Never Used    Tobacco comment: associates smoking   Substance Use Topics    Alcohol use: No    Drug use: No       Current Med List:  Current Outpatient Medications   Medication Sig Dispense Refill    diclofenac sodium (VOLTAREN) 1 % GEL Apply 4 g topically 4 times daily 4 Tube 1    Elastic Bandages & Supports (KNEE BRACE ADJUSTABLE HINGED) MISC Use as directed 1 each 0    Elastic Bandages & Supports (KNEE BRACE) MISC 2 Units by Does not apply route daily 2 each 0    gabapentin (NEURONTIN) 300 MG capsule Take 1 capsule by mouth. .      fluticasone (FLOVENT HFA) 110 MCG/ACT inhaler Inhale 1 puff into the lungs 2 times daily 1 Inhaler 11    albuterol sulfate  (90 Base) MCG/ACT inhaler Inhale 2 puffs into the lungs every 6 hours as needed for Wheezing 1 Inhaler 3    ibuprofen (ADVIL;MOTRIN) 800 MG tablet Take 1 tablet by mouth every 6 hours as needed for Pain 30 tablet 0    DICLOFENAC POTASSIUM PO Take 50 mg by mouth 3 times daily as needed      ARIPiprazole (ABILIFY) 15 MG tablet Take 15 mg by mouth nightly       traZODone (DESYREL) 50 MG tablet Take 50 mg by mouth nightly      pravastatin (PRAVACHOL) 80 MG tablet Take 80 mg by mouth nightly       oxybutynin (DITROPAN-XL) 10 MG extended release tablet Take 10 mg by mouth daily      potassium chloride (MICRO-K) 10 MEQ CR capsule Take 10 mEq by mouth daily.  valsartan (DIOVAN) 40 MG tablet Take 40 mg by mouth nightly Patient takes at night      metFORMIN (GLUCOPHAGE) 500 MG tablet Take 500 mg by mouth daily (with breakfast).  hydrochlorothiazide (MICROZIDE) 12.5 MG capsule Take 12.5 mg by mouth nightly       amLODIPine (NORVASC) 10 MG tablet Take 10 mg by mouth nightly Patient takes at night      simvastatin (ZOCOR) 20 MG tablet Take 80 mg by mouth nightly. No current facility-administered medications for this visit. Allergies   Allergen Reactions    Lisinopril      Makes her cough       SOCIAL:      This patient is alone for the evaluation today.       [] HIV Risk Factors (i.e.) intravenous drug abuser; at risk sexual behavior; received blood products    [] TB Risk Factors (i.e.) Medically underserved, institutional care, foreign born, endemic area; exposure to active case    [] Hepatitis B&C Risk Factors (i.e.) Received blood transfusion prior to 1992; recreational drug use; high risk sexual behaviors; tattoos or body piercings; contact with blood or needle sticks in the workplace    Comprehension    Ability to grasp concepts and respond to questions:   [x] High   [] Medium   [] Low    Motivation    [x] Asks Questions; eager to learn   [] Needs education   [] Extreme anxiety    [] uncooperative   [] Denies need for education    English Speaking Ability    [x] Speaks English well   [] Reads [place] well   [] Understands spoken Lyn Gifford    [] Understands written English   [] No need for interpretive support      [] Might benefit from interpretive support   []  required for all services     REVIEW OF SYSTEMS:     Do you or have you had any of the following?   Cardiovascular YES NO Respiratory YES NO   High Blood Pressure   [x]   [x] COPD   []   [x]   Heart Attack   []   [x] TB/Positive skin Test   []   [x]   Congestive Heart Failure   []   [x] Obstructive Sleep Apnea   []   [x]   Coronary Artery Disease   []   [x] Asthma   []   [x]   Circulation Problems   []   [x]      Activity Intolerance   []   [x] Gastrointestinal YES NO   Peripheral Vascular Disease   []   [x] Gastric Problems   []   [x]        Colorectal problems   []   [x]   Hematological YES NO Ulcer disease   []   [x]   Bleeding Tendencies   []   [x] Liver disease   []   [x]   Blood Transfusion last 30d   []   [x] Gallstones   []   [x]   Anemia   []   [x] Refulx or Heartburn   []   [x]   Blood Clots   []   [x]      High Cholesterol   [x]   [] Muscoloskeletal YES NO   High Triglycerides   []   [x] Joint Limitations   []   [x]      Muscle Weakness   []   [x]   Eyes, Ears, Nose, Throat YES NO Multiple Sclerosis   []   [x]   Cataracts   []   [x] Arthritis   []   [x]   Glasses   [x]   []      Blurred Vision   []   [x] Cancer   []   [x]   Hearing Aids   []   [x] Type:     Ringing in Ears   []   [x]      Difficulty Swallowing   [x]   [] Encodrine YES NO      Diabetes   [x]   []   Neurological YES NO Thyroid   []   [x]   Stroke   []   [x]      Seizure   []   [x] Psychiatric Disorder YES NO   Dizziness/Blackouts/Fainting   [] [x] Depression   [x]   []   Memory Impairement   []   [x] Bipolar   []   [x]   Parkinson's   []   [x] Anxiety disorder   []   [x]           Genitourinary/Gyn YES NO Skin Intact   [x]   []   Urinary Infection   []   [x]      Stones   []   [x] Sleep   YES NO   Kidney Disease   []   [x] Excessive daytime sleepiness   [x]   [x]   Incontinent   []   [x] Snoring   []   [x]   Irregular menstrual cycles   []   [x] Unrefreshed sleep   []   [x]   Possibly Pregnant? []     [x] Other:      Date of LMP:   Preferred location:       PREVIOUS ANESTHESIA  Has any family member had a problem with anesthesia in the past?  [x] No   [] Yes  If yes, describe:  Have you had a problem with anesthesia in the past?                 [x] No   [] Yes  If yes, describe:  Do you have any difficulty moving your head side-to-side? [x] No   [] Yes  Do you have difficulty opening or closing your jaw? [x] No   [] Yes       PRESENT ILLNESS:     Weight Parameters  Weight (!) 354 lb (160.6 kg)   Height 5' 5\" (1.651 m)   BMI Body mass index is 58.91 kg/m². IBW     EBW               IMMUNIZATION STATUS  Immunization History   Administered Date(s) Administered    Influenza Vaccine, unspecified formulation 10/26/2017, 09/27/2018       FALLS ASSESSMENT    [x] LOW RISK FOR FALLS    [] MODERATE RISK FOR FALLS    [] Difficulty walking/selfcare    [] Falls in the past 2 months    [] Suspicion of Clinician    [] Other:      SMOKING CESSATION     [x] Not needed     [] Instructed to stop smoking    [] Pamphlet community resources given     VTE SCREEN    [] Family hx DVT/PE  /   [] Personal hx of DVT/PE    [x] Denies any family or personal hx of DVT/PE    Physician Review    [x] Past medical, family, & social history reviewed and discussed with patient.      Review of surgery and post-surgical changes (by surgeon for surgical patients only)    [x] Lifelong diet expectations reviewed with patient    [x] Need for lifelong vitamin supplementation reviewed modifications and diet changes, protein and vitamin supplementation, as well as routine scheduled and dedicated exercise. I instructed the patient to utilize the exercise log that will be given to them at their fist dietician appointment. We discussed the potential weight loss benefit of approximately 60-70% of her excess body weight at 12-18 months post-op, as well as the possibility of insufficient weight loss or weight gain after 2 years post-operative time. PLAN:       Diagnosis Orders   1. Diabetes mellitus type 2 in obese (HCC)  CBC Auto Differential    Comprehensive Metabolic Panel    Ferritin    Hemoglobin A1C    Iron and TIBC    Lipid Panel    Magnesium    PTH, Intact    Zinc    Vitamin B12 & Folate    Vitamin D 25 Hydroxy    Vitamin B1    Vitamin A    TSH without Reflex    T4, Free    Amb External Referral To Cardiology   2. Essential hypertension  CBC Auto Differential    Comprehensive Metabolic Panel    Ferritin    Hemoglobin A1C    Iron and TIBC    Lipid Panel    Magnesium    PTH, Intact    Zinc    Vitamin B12 & Folate    Vitamin D 25 Hydroxy    Vitamin B1    Vitamin A    TSH without Reflex    T4, Free    Amb External Referral To Cardiology    Ambulatory referral to Pulmonology   3. Obstructive sleep apnea  CBC Auto Differential    Comprehensive Metabolic Panel    Ferritin    Hemoglobin A1C    Iron and TIBC    Lipid Panel    Magnesium    PTH, Intact    Zinc    Vitamin B12 & Folate    Vitamin D 25 Hydroxy    Vitamin B1    Vitamin A    TSH without Reflex    T4, Free    Amb External Referral To Cardiology    Ambulatory referral to Pulmonology   4.  Morbid obesity (HCC)  CBC Auto Differential    Comprehensive Metabolic Panel    Ferritin    Hemoglobin A1C    Iron and TIBC    Lipid Panel    Magnesium    PTH, Intact    Zinc    Vitamin B12 & Folate    Vitamin D 25 Hydroxy    Vitamin B1    Vitamin A    TSH without Reflex    T4, Free    Amb External Referral To Cardiology    Ambulatory referral to Pulmonology          Initial Testing     Primary Procedure: Sleeve Gastrectomy     Other Procedures:None    Labwork: Initial Pre-surgical Lab Tests (CMP, TSH, Fasting Lipid Profile, Mg, Zinc, Vit B1 (whole blood), Vit B12, 25-OH Vit D, Fe,  Ferritin,  Folate) and Negative serum nicotine prior to submission for pre-auth    Imaging: None    Endoscopic Studies: Upper GI Endoscopy for GERD and difficulty swallowing which has been untreated. Psychological Assessment: Psychological Evaluation and Clearance    Nutrition Assessment: Bariatric Nutrition Assessment and Clearance    Cardiology Risk Stratification:  Cardiology consult for clearance    Pulmonary Evaluation: Obstructive Sleep Apnea Evaluation, Pulmonary Clearance and CPAP Settings    Other  Consultations: PCP clearance    Physician Supervised Diet and Exercise required by the patients insurance company: 6 months.       Surgical Diet requirement:  3 weeks    Final Testing  Screening Chest Xray  and EKG within 6 months of date of surgery    Labwork:  Final Lab Tests  within 3 months of date of surgery (CBC, PT/PTT, BMP)    Electronically signed by Thalia Mon DO on 6/30/2019 at 2:47 PM

## 2019-07-01 ENCOUNTER — HOSPITAL ENCOUNTER (OUTPATIENT)
Age: 49
Discharge: HOME | End: 2019-07-01
Payer: COMMERCIAL

## 2019-07-01 DIAGNOSIS — I10 ESSENTIAL HYPERTENSION: ICD-10-CM

## 2019-07-01 DIAGNOSIS — G47.33 OBSTRUCTIVE SLEEP APNEA: ICD-10-CM

## 2019-07-01 DIAGNOSIS — E11.69 DIABETES MELLITUS TYPE 2 IN OBESE (HCC): ICD-10-CM

## 2019-07-01 DIAGNOSIS — E66.01 MORBID OBESITY (HCC): ICD-10-CM

## 2019-07-01 DIAGNOSIS — E66.9 DIABETES MELLITUS TYPE 2 IN OBESE (HCC): ICD-10-CM

## 2019-07-01 LAB
ABSOLUTE EOS #: 0.22 K/UL (ref 0–0.44)
ABSOLUTE IMMATURE GRANULOCYTE: <0.03 K/UL (ref 0–0.3)
ABSOLUTE LYMPH #: 2.33 K/UL (ref 1.1–3.7)
ABSOLUTE MONO #: 0.38 K/UL (ref 0.1–1.2)
ALBUMIN SERPL-MCNC: 3.9 G/DL (ref 3.5–5.2)
ALBUMIN/GLOBULIN RATIO: 1.1 (ref 1–2.5)
ALP BLD-CCNC: 84 U/L (ref 35–104)
ALT SERPL-CCNC: 16 U/L (ref 5–33)
ANION GAP SERPL CALCULATED.3IONS-SCNC: 12 MMOL/L (ref 9–17)
AST SERPL-CCNC: 13 U/L
BASOPHILS # BLD: 1 % (ref 0–2)
BASOPHILS ABSOLUTE: 0.05 K/UL (ref 0–0.2)
BILIRUB SERPL-MCNC: 0.2 MG/DL (ref 0.3–1.2)
BUN BLDV-MCNC: 18 MG/DL (ref 6–20)
BUN/CREAT BLD: ABNORMAL (ref 9–20)
CALCIUM SERPL-MCNC: 9.3 MG/DL (ref 8.6–10.4)
CHLORIDE BLD-SCNC: 105 MMOL/L (ref 98–107)
CHOLESTEROL/HDL RATIO: 3.7
CHOLESTEROL: 185 MG/DL
CO2: 26 MMOL/L (ref 20–31)
CREAT SERPL-MCNC: 0.78 MG/DL (ref 0.5–0.9)
DIFFERENTIAL TYPE: ABNORMAL
EOSINOPHILS RELATIVE PERCENT: 4 % (ref 1–4)
ESTIMATED AVERAGE GLUCOSE: 143 MG/DL
FERRITIN: 58 UG/L (ref 13–150)
FOLATE: 13.7 NG/ML
GFR AFRICAN AMERICAN: >60 ML/MIN
GFR NON-AFRICAN AMERICAN: >60 ML/MIN
GFR SERPL CREATININE-BSD FRML MDRD: ABNORMAL ML/MIN/{1.73_M2}
GFR SERPL CREATININE-BSD FRML MDRD: ABNORMAL ML/MIN/{1.73_M2}
GLUCOSE BLD-MCNC: 81 MG/DL (ref 70–99)
HBA1C MFR BLD: 6.6 % (ref 4–6)
HCT VFR BLD CALC: 40.6 % (ref 36.3–47.1)
HDLC SERPL-MCNC: 50 MG/DL
HEMOGLOBIN: 12.4 G/DL (ref 11.9–15.1)
IMMATURE GRANULOCYTES: 0 %
IRON SATURATION: 18 % (ref 20–55)
IRON: 53 UG/DL (ref 37–145)
LDL CHOLESTEROL: 124 MG/DL (ref 0–130)
LYMPHOCYTES # BLD: 42 % (ref 24–43)
MAGNESIUM: 2 MG/DL (ref 1.6–2.6)
MCH RBC QN AUTO: 27.6 PG (ref 25.2–33.5)
MCHC RBC AUTO-ENTMCNC: 30.5 G/DL (ref 28.4–34.8)
MCV RBC AUTO: 90.2 FL (ref 82.6–102.9)
MONOCYTES # BLD: 7 % (ref 3–12)
NRBC AUTOMATED: 0 PER 100 WBC
PDW BLD-RTO: 15.5 % (ref 11.8–14.4)
PLATELET # BLD: 282 K/UL (ref 138–453)
PLATELET ESTIMATE: ABNORMAL
PMV BLD AUTO: 10 FL (ref 8.1–13.5)
POTASSIUM SERPL-SCNC: 3.8 MMOL/L (ref 3.7–5.3)
PTH INTACT: 103.8 PG/ML (ref 15–65)
RBC # BLD: 4.5 M/UL (ref 3.95–5.11)
RBC # BLD: ABNORMAL 10*6/UL
SEG NEUTROPHILS: 46 % (ref 36–65)
SEGMENTED NEUTROPHILS ABSOLUTE COUNT: 2.62 K/UL (ref 1.5–8.1)
SODIUM BLD-SCNC: 143 MMOL/L (ref 135–144)
THYROXINE, FREE: 0.98 NG/DL (ref 0.93–1.7)
TOTAL IRON BINDING CAPACITY: 296 UG/DL (ref 250–450)
TOTAL PROTEIN: 7.6 G/DL (ref 6.4–8.3)
TRIGL SERPL-MCNC: 53 MG/DL
TSH SERPL DL<=0.05 MIU/L-ACNC: 0.89 MIU/L (ref 0.3–5)
UNSATURATED IRON BINDING CAPACITY: 243 UG/DL (ref 112–347)
VITAMIN B-12: 519 PG/ML (ref 232–1245)
VITAMIN D 25-HYDROXY: 23.4 NG/ML (ref 30–100)
VLDLC SERPL CALC-MCNC: NORMAL MG/DL (ref 1–30)
WBC # BLD: 5.6 K/UL (ref 3.5–11.3)
WBC # BLD: ABNORMAL 10*3/UL

## 2019-07-01 PROCEDURE — 83550 IRON BINDING TEST: CPT

## 2019-07-01 PROCEDURE — 82746 ASSAY OF FOLIC ACID SERUM: CPT

## 2019-07-01 PROCEDURE — 84443 ASSAY THYROID STIM HORMONE: CPT

## 2019-07-01 PROCEDURE — 82306 VITAMIN D 25 HYDROXY: CPT

## 2019-07-01 PROCEDURE — 84439 ASSAY OF FREE THYROXINE: CPT

## 2019-07-01 PROCEDURE — 83540 ASSAY OF IRON: CPT

## 2019-07-01 PROCEDURE — 80053 COMPREHEN METABOLIC PANEL: CPT

## 2019-07-01 PROCEDURE — 84630 ASSAY OF ZINC: CPT

## 2019-07-01 PROCEDURE — 82728 ASSAY OF FERRITIN: CPT

## 2019-07-01 PROCEDURE — 83970 ASSAY OF PARATHORMONE: CPT

## 2019-07-01 PROCEDURE — 85025 COMPLETE CBC W/AUTO DIFF WBC: CPT

## 2019-07-01 PROCEDURE — 82607 VITAMIN B-12: CPT

## 2019-07-01 PROCEDURE — 36415 COLL VENOUS BLD VENIPUNCTURE: CPT

## 2019-07-01 PROCEDURE — 80061 LIPID PANEL: CPT

## 2019-07-01 PROCEDURE — 84590 ASSAY OF VITAMIN A: CPT

## 2019-07-01 PROCEDURE — 83036 HEMOGLOBIN GLYCOSYLATED A1C: CPT

## 2019-07-01 PROCEDURE — 84425 ASSAY OF VITAMIN B-1: CPT

## 2019-07-01 PROCEDURE — 83735 ASSAY OF MAGNESIUM: CPT

## 2019-07-02 ENCOUNTER — HOSPITAL ENCOUNTER (OUTPATIENT)
Dept: PHYSICAL THERAPY | Facility: CLINIC | Age: 49
Setting detail: THERAPIES SERIES
Discharge: HOME | End: 2019-07-02
Payer: COMMERCIAL

## 2019-07-02 PROCEDURE — 97161 PT EVAL LOW COMPLEX 20 MIN: CPT

## 2019-07-02 PROCEDURE — 97110 THERAPEUTIC EXERCISES: CPT

## 2019-07-02 NOTE — FLOWSHEET NOTE
[] [facility]) [facility] &  Therapy  [address]  [phone]  F: [phone] [x] [address] [facility] [address]   [address]  P: [phone]  F: [phone] [] [address]  [address] [facility]  P: [phone]  F: [phone] [] [address]  [facility]   [address]   [place]: [phone]  F: [phone]     THERAPY RESPONSIBILITY OF CARE TRANSFER FORM       PATIENT NAME: Beverley Blevins  MRN: [id number]   : 1970      TRANSFERRING FACILITY:    [] [facility]   [] [facility]   [x]  [facility]   [] [facility]   [] [facility]   [] [facility]   [] [facility]    [] Other:       ACCEPTING FACILITY   [] [facility]   [] [facility]   [x]  [facility]   [] [facility]   [] [facility]   [] [facility]   [] [facility]    [] Other:          REASON FOR TRANSFER: Floating outpatient physical therapist; also has seen this previous PT.       TRANSFER OF CARE:    I am transferring the care of the above patient to: Lourdes Olivares, DAVID Garnett, DAVID  2019      ACCEPTANCE OF CARE:     I am accepting the care of the above patient.  Lourdes Olivares, DAVID

## 2019-07-02 NOTE — CONSULTS
Patient is scheduled on 6/12/19.   Manuelas   [x]   EVER     Talor Tilt   [x]        Pat-Fem Grind - - []     Denies inc pain with B knee/hip gross MMT. Requires inc cues of all types in order to achieve appropriate quad activation of B LEs. A of PT for SLR flexion of R, L LE- though no quad lag, and no inc pain. Pt only reports inc challenge. Active pushing against PT hand during ECC lower of SLR flexion of B LEs. No reports of inc pain with AROM measurements of B knees. OBSERVATION No Deficit Deficit Not Tested Comments   Posture       Forward Head [] [x] []    Rounded Shoulders [] [x] []    Kyphosis [] [x] [] Inc cervical, thoracic. Lordosis [] [x] [] Inc.    Lateral Shift [x] [] []    Scoliosis [x] [] []    Genu Valgus [] [x] []    Genu Varus [x] [] []    Genu Recurvatum [] [x] []    Pronation [] [x] []    Supination [x] [] []    Leg Length Discrp [] [] [x]    Slumped Sitting [x] [] []    Palpation [x] [] [] Denies TTP of both knees- reports only inc pain with inc standing and ambulation. Sensation [x] [] []    Edema [x] [] []    Neurological [x] [] []    Patellar Mobility [] [] [x] Unable to assess d/t inc adipose tissue. Patellar Orientation [x] [] []    Gait [] [x] [] Analysis: with rollator; wide NICKI; inc toe-out B LEs; inc trunk flexion; B lateral trunk sway. FUNCTION Normal Difficult Unable   Sitting [x] [] []   Standing [] [x] []   Ambulation [] [x] []   Groom/Dress [x] [] []   Lift/Carry [] [] [x]   Stairs [] [] [x]   Bending [] [] [x]   Squat [] [] [x]   Kneel [] [] [x]     No longer negotiates stairs- first floor living. BALANCE/PROPRIOCEPTION              [x] Not tested   Single leg stance       R                     L                                PAIN   Eyes open                             Sec. Sec                  . []    Eyes closed                          Sec. Sec                  . []        FUNCTIONAL TESTS PAIN NO PAIN COMMENTS   Step Test 4 [] []    6 [] []    8

## 2019-07-02 NOTE — FLOWSHEET NOTE
[facility] Fall Risk Assessment    Patient Name:  Morenita Olivares  : 1970    Risk Factor Scale  Score   History of Falls [x] Yes  [] No 25  0 25   Secondary Diagnosis [x] Yes- diabetic neuropathy  [] No 15  0 15   Ambulatory Aid [] Furniture  [x] Crutches/cane/walker  [] None/bedrest/wheelchair/nurse 30  15  0 15   IV/Heparin Lock [] Yes  [x] No 20  0 0   Gait/Transferring [] Impaired  [x] Weak  [] Normal/bedrest/immobile 20  10  0 10   Mental Status [x] Forgets limitations  [] Oriented to own ability 15  0 15      Total: 80     Based on the Assessment score: check the appropriate box.     []  No intervention needed   Low =   Score of 0-24    []  Use standard prevention interventions Moderate =  Score of 24-44   [] Give patient handout and discuss fall prevention strategies   [] Establish goal of education for patient/family RE: fall prevention strategies    [x]  Use high risk prevention interventions High = Score of 45 and higher   [x] Give patient handout and discuss fall prevention strategies   [x] Establish goal of education for patient/family Re: fall prevention strategies   [] Discuss lifeline / other resources    Electronically signed by:   Bhavna Brasher PT  Date: 2019

## 2019-07-04 LAB
RETINYL PALMITATE: <0.02 MG/L (ref 0–0.1)
VITAMIN A LEVEL: 0.38 MG/L (ref 0.3–1.2)
VITAMIN A, INTERP: NORMAL
VITAMIN B1 WHOLE BLOOD: 90 NMOL/L (ref 70–180)
ZINC: 54.4 UG/DL (ref 60–120)

## 2019-07-15 ENCOUNTER — HOSPITAL ENCOUNTER (OUTPATIENT)
Dept: PHYSICAL THERAPY | Facility: CLINIC | Age: 49
Setting detail: THERAPIES SERIES
Discharge: HOME | End: 2019-07-15
Payer: COMMERCIAL

## 2019-07-15 PROCEDURE — 97110 THERAPEUTIC EXERCISES: CPT

## 2019-07-15 NOTE — FLOWSHEET NOTE
[] [facility]) - [facility] &  Therapy  [address]  [phone]  F: [phone] [x] [address]  [facility]   [address]  P: [phone]  F: [phone] [] [address]  [address] [facility] [address]  P: [phone]  F: [phone] [] [address]  [facility]   [address]  [place]: [phone]  F: [phone]     Physical Therapy Daily Treatment Note    Date:  7/15/2019  Patient Name:  Jeevan Mera    :  1970  MRN: [id number]  Physician: Dr. Karla Fritz MD                  Insurance: [address] 7/15/19-19  Medical Diagnosis: M17.0- primary osteoarthritis of both knees                  Rehab Codes: M17.0; M62.81; R26.2; Z91.81  Onset date:                      Next 's appt.: TBD- with pain management- referred for therapy  Visit# / total visits:    Cancels/No Shows: 0/0    Subjective:    Pain:  [x] Yes  [] No Location: B knees Pain Rating: (0-10 scale)4/10  Pain altered Tx:  [x] No  [] Yes  Action:  Comments: Pt reports no major changes since evaluation and reports non-compliance with HEP.      Objective:  Modalities: B Knee CPs for 10min supine with a wedge for each LE  Precautions: B knee OA- symmetrical; intermittent B LE buckling; ambulates with rollator; FALL RISK; obese; mental handicap  Exercises: Supine with x2 pillows and wedge  Exercise Reps/ Time Weight/ Level Comments   SCI-FIT/NUSTEP 5min         CALF STRETCH          PATELLA MOBS x    gentle             HS STRETCH next    With belt   QUAD SETS 15x5\"    Both at once for inc neuro-feedback; HEP   HS SETS 15x5\"       HEEL SLIDES    Done in seated   SAQ'S 15x Foam roll under B knees Both at once for inc neuro-feedback; HEP   SLR 10x AA      GLUTE SETS 15x5\"  Hooklying; HEP                 SIDE HIP ABD      Add next

## 2019-07-18 ENCOUNTER — HOSPITAL ENCOUNTER (OUTPATIENT)
Dept: PHYSICAL THERAPY | Facility: CLINIC | Age: 49
Setting detail: THERAPIES SERIES
Discharge: HOME | End: 2019-07-18
Payer: COMMERCIAL

## 2019-07-18 PROCEDURE — 97110 THERAPEUTIC EXERCISES: CPT

## 2019-07-22 ENCOUNTER — TELEPHONE (OUTPATIENT)
Dept: BARIATRICS/WEIGHT MGMT | Age: 49
End: 2019-07-22

## 2019-07-24 ENCOUNTER — HOSPITAL ENCOUNTER (OUTPATIENT)
Dept: PHYSICAL THERAPY | Facility: CLINIC | Age: 49
Setting detail: THERAPIES SERIES
Discharge: HOME | End: 2019-07-24
Payer: COMMERCIAL

## 2019-07-24 PROCEDURE — 97110 THERAPEUTIC EXERCISES: CPT

## 2019-07-24 NOTE — FLOWSHEET NOTE
neuro-feedback; HEP   SLR 10x A      GLUTE SETS 15x5\"  Hooklying; HEP                 SIDE HIP ABD 15x AA Added 7/18   clamshells 15x A Added 7/18   SIDE HIP ADD      add next                 PRONE            HIP EXT             HS CURLS                  SEATED      HEEL SLIDES    pillowcase    CALF STRETCH 2x20\"     belt   LAQ'S 15x     w/ ball hip add for VMO   MARCHES  15xea         ANKLE PUMPS 15x        Hip add with ball 15x5\"     Hip abd 15x Blueberry                         STANDING   Added 7/24   HEEL RAISES   15x         SQUATS - mini 5x    MINI, max UE support                 STEP UPS            STEPDOWNS                          TKE    Give band next   3 way hip 15xea AROM      HS CURLS  15xea       Ambulation  1 lap  One seated rest break due to fatigue   Other:       Specific Instructions for next treatment: BLE Strength & Flexibility exercises, AAROM/PROM/AROM,  progress to standing/SLS activities as able, dynamic/static balance activities, vasocompression, alterG if/when able,  gait training with rollator; and sit<>stand re-training from high mat table next visit. Treatment Charges: Mins Units   []  Modalities     [x]  Ther Exercise  40 3   []  Manual Therapy     []  Ther Activities     []  Aquatics     []  Vasocompression     []  Other CP     Total Treatment time 40 3       Assessment: [x] Progressing toward goals: added standing exercises today as charted. Pt needing mod-max UE supports for all standing exercises. Minimal complaints throughout. Mod cuing for all exercises. [] No change. [x] Other: transition to aquatic therapy if able and if no relief with land-based PT services (after 6 visits)    Problems at Initial Evaluation 7/2/19: Patient is a 47 y.o. Pleasant female who presents to physical therapy initial evaluation with signs and symptoms consistent with potential B knee osteoarthritis. Patient demonstrates impairments and symptoms as detailed below in therapy goals.  Patient

## 2019-07-29 ENCOUNTER — HOSPITAL ENCOUNTER (OUTPATIENT)
Dept: PHYSICAL THERAPY | Facility: CLINIC | Age: 49
Setting detail: THERAPIES SERIES
Discharge: HOME | End: 2019-07-29
Payer: COMMERCIAL

## 2019-07-29 PROCEDURE — 97110 THERAPEUTIC EXERCISES: CPT

## 2019-07-29 NOTE — FLOWSHEET NOTE
impairments and symptoms as detailed below in therapy goals. Patient currently limited in sit<>stand; standing; and ambulation tolerance secondary to these impairments and increased symptoms. Patient would benefit from continued physical therapy services in order to address these impairments and symptoms, and return to QOL, ADLs, work. Anticipated frequency detailed above. Prognosis limited secondary to chronic or prolonged history of current condition; intermittent B LE buckling; history of falls; obese; mental handicap- justifying a low complexity evaluation. If unable to achieve significant improvements within six to twelve visits, will consult referring physician and consider a follow-up visit with said physician. Patient verbalized understanding and agreement to all aforementioned statements. Also may consider transition to aquatic therapy if no relief or changes with x6 visits- secondary to this being second episode of care for this same condition. STG: (to be met in 6 treatments)  1. ? Pain: Patient will report < 3/10 pain at rest and < 8/10 pain with active movement- standing, ambulation- in order to improve QOL. 2. ? ROM: Patient will demo +5-10 deg AROM flexion of both LEs for improved ease of transfers. 3. ? Strength: Patient will demo x10-15 SLR flexion consecutively of each LE in order to demo improved quad tolerance for standing, ambulation. 4. ? Function: Patient will report dec frequency of B LE buckling and falls with inc standing, ambulation to none over x6 visits for improved safety at home and in community. 5. Independent with Home Exercise Programs  6. Demonstrate Knowledge of fall prevention MET, Fall prevention information printout given & reviewed on 7/15/19. Pt. with good understanding, but may need reminders for safety overall. LTG: (to be met in 12 treatments)  1.  Patient will sit<>stand; stand 10 minutes; and ambulate 10 minutes with rollator with < 5/10

## 2019-08-02 ENCOUNTER — HOSPITAL ENCOUNTER (OUTPATIENT)
Dept: PHYSICAL THERAPY | Facility: CLINIC | Age: 49
Setting detail: THERAPIES SERIES
Discharge: HOME | End: 2019-08-02
Payer: COMMERCIAL

## 2019-08-02 PROCEDURE — 97110 THERAPEUTIC EXERCISES: CPT

## 2019-08-02 NOTE — PROGRESS NOTES
can acquire proper clothing. [] No change. [x] Other: encouraged patient to acquire bathing suit/pool appropriate clothing as she would likely better tolerate aquatics.       STG: (to be met in 6 treatments)  1. ? Pain: Patient will report < 3/10 pain at rest and < 8/10 pain with active movement- standing, ambulation- in order to improve QOL. -MET, reports 2/10 average, 5/10 with active movement  2. ? ROM: Patient will demo +5-10 deg AROM flexion of both LEs for improved ease of transfers.- NOT MET, 100 L, 94 R   3. ? Strength: Patient will demo x10-15 SLR flexion consecutively of each LE in order to demo improved quad tolerance for standing, ambulation.- MET, able to complete 15x bilaterally   4. ? Function: Patient will report dec frequency of B LE buckling and falls with inc standing, ambulation to none over x6 visits for improved safety at home and in community- MET, denies recent knee buckling    5. Independent with Home Exercise Programs- Progress made, reports compliance however requires constant cueing in clinic   6. Demonstrate Knowledge of fall prevention MET, Fall prevention information printout given & reviewed on 7/15/19. Pt. with good understanding, but may need reminders for safety overall.      LTG: (to be met in 12 treatments)  1. Patient will sit<>stand; stand 10 minutes; and ambulate 10 minutes with rollator with < 5/10 pain for improved functional mobility at home and in community.    2. Improve LEFS to 40/80    Treatment to Date:  [x] Therapeutic Exercise   19084  [] Iontophoresis: 4 mg/mL Dexamethasone Sodium Phosphate  mAmin  59547   [x] Therapeutic Activity  95843 [] Vasopneumatic cold with compression  86243    [x] Gait Training   39326 [] Ultrasound   01269   [] Neuromuscular Re-education  55544 [] Electrical Stimulation Unattended  07044   [] Manual Therapy  95384 [] Electrical Stimulation Attended  03928   [x] Instruction in HEP  []

## 2019-08-06 ENCOUNTER — HOSPITAL ENCOUNTER (OUTPATIENT)
Dept: PHYSICAL THERAPY | Facility: CLINIC | Age: 49
Setting detail: THERAPIES SERIES
Discharge: HOME | End: 2019-08-06
Payer: COMMERCIAL

## 2019-08-06 PROCEDURE — 97110 THERAPEUTIC EXERCISES: CPT

## 2019-08-06 NOTE — FLOWSHEET NOTE
SAQ'S 15x Foam roll under B knees Both at once for inc neuro-feedback; HEP   SLR 15x A      GLUTE SETS 15x5\"  Hooklying; HEP                 SIDE HIP ABD 15x AA Added 7/18   clamshells 15x A Added 7/18   SIDE HIP ADD 15x ball squeeze Unable to complete SL 8/2                 PRONE            HIP EXT             HS CURLS                  SEATED      HEEL SLIDES    pillowcase    CALF STRETCH 2x20\"     belt   LAQ'S 15x   1#  w/ ball hip add for VMO Added weight 8/6   MARCHES 15xea   1# Added weight 8/6   ANKLE PUMPS 15x        Hip add with ball 15x5\"     Hip abd - supine today 15x plum                        STANDING   Added 7/24   HEEL RAISES 15x         SQUATS - mini 5x    MINI, max UE support - not today \" oh I cant do those\".               STEP UPS            STEPDOWNS                          TKE 15x Blueberry  Added 8/2   3 way hip 15xea 1# Added weight 8/6   HS CURLS  15xea  1# Added weight 8/6   Ambulation  1 lap     Other:       Specific Instructions for next treatment: BLE Strength & Flexibility exercises, AAROM/PROM/AROM,  progress to standing/SLS activities as able, dynamic/static balance activities, vasocompression, gait training with rollator; and sit<>stand re-training from high mat table next visit. Treatment Charges: Mins Units   []  Modalities     [x]  Ther Exercise  45 3   []  Manual Therapy     []  Ther Activities     []  Aquatics     []  Vasocompression     []  Other CP     Total Treatment time 45 3       Assessment: [x] Progressing toward goals: Continued with charted above with moderate tolerance. Added in 1# ankle weights to all standing exercises, pt reports fatigue quickly. Reports increased pain through out session however unable to state exact exercises that caused increased pain. Continued max use of UE for all standing activities. [] No change. [x] Other: encouraged patient to acquire bathing suit/pool appropriate clothing as she would likely better tolerate aquatics.

## 2019-08-09 ENCOUNTER — HOSPITAL ENCOUNTER (OUTPATIENT)
Dept: PHYSICAL THERAPY | Facility: CLINIC | Age: 49
Setting detail: THERAPIES SERIES
Discharge: HOME | End: 2019-08-09
Payer: COMMERCIAL

## 2019-08-09 PROCEDURE — 97110 THERAPEUTIC EXERCISES: CPT

## 2019-08-12 ENCOUNTER — APPOINTMENT (OUTPATIENT)
Dept: PHYSICAL THERAPY | Facility: CLINIC | Age: 49
End: 2019-08-12
Payer: COMMERCIAL

## 2019-08-14 ENCOUNTER — HOSPITAL ENCOUNTER (OUTPATIENT)
Dept: PHYSICAL THERAPY | Facility: CLINIC | Age: 49
Setting detail: THERAPIES SERIES
Discharge: HOME | End: 2019-08-14
Payer: COMMERCIAL

## 2019-08-14 ENCOUNTER — APPOINTMENT (OUTPATIENT)
Dept: PHYSICAL THERAPY | Facility: CLINIC | Age: 49
End: 2019-08-14
Payer: COMMERCIAL

## 2019-08-14 PROCEDURE — 97110 THERAPEUTIC EXERCISES: CPT

## 2019-08-14 NOTE — FLOWSHEET NOTE
in neutral-use slideboard next visit, added 8/9/19   HS SETS 15x5\"       HEEL SLIDES  15x AA   supine    SAQ'S 15x Foam roll under B knees 1# wt added 8/9/19   SLR 15x A      GLUTE SETS 15x5\"  With foam roll under knees 8/9                 SIDE HIP ABD 15x A Added 7/18   Reverse clamshells 15x A Added 8/9/19   clamshells 15x A Added 7/18   SIDE HIP ADD 15x ball squeeze Unable to complete SL 8/2                 PRONE            HIP EXT             HS CURLS                  SEATED      HEEL SLIDES    pillowcase    CALF STRETCH 2x20\"     belt   LAQ'S 15x   1#     MARCHES 15xea   1# Added weight 8/6   ANKLE PUMPS 15x     home this date   Hip add with ball 15x5\"  Seated 8/9/19   Hip abd - 15x plum Seated 8/9/19                       STANDING   Flexed position and max UE support with all standing, 8/9/19   HEEL RAISES 15x      barely clearing heels 8/9/19   SQUATS - mini 5x    MINI, max UE support - not 8/14/19 refuses to attempt .                  STEP UPS            STEPDOWNS                          TKE 15x Blueberry  Added 8/2   3 way hip 15xea 1# Added weight 8/6   HS CURLS  15xea  1# Added weight 8/6   Ambulation  1 lap  1 rest break; SOB after 8/9/19   Other: pt needs reminders to lock rolling walker prior to sit to stand transfers when using as assistance. Answers many questions with \"I'm not sure\"   Rest breaks required throughout session      Specific Instructions for next treatment: BLE Strength & Flexibility exercises, AAROM/PROM/AROM,  progress to standing/SLS activities as able, dynamic/static balance activities, vasocompression, gait training with rollator; and sit<>stand re-training from high mat table next visit.        Treatment Charges: Mins Units   []  Modalities     [x]  Ther Exercise  40 3   []  Manual Therapy     []  Ther Activities     []  Aquatics     []  Vasocompression     []  Other CP     Total Treatment time 40 3       Assessment: [x] Progressing toward goals: Limited progressions this date due to pain and fatigue. Patient still requires frequent cuing for proper execution with form. Patient struggles with most exercises using both legs, so SL activities were not attempted at this time. [] No change. [x] Other: Pt would benefit from at least trial visit in aquatics                     STG: (to be met in 6 treatments)  1. ? Pain: Patient will report < 3/10 pain at rest and < 8/10 pain with active movement- standing, ambulation- in order to improve QOL. -MET, reports 2/10 average, 5/10 with active movement  2. ? ROM: Patient will demo +5-10 deg AROM flexion of both LEs for improved ease of transfers.- NOT MET, 100 L, 94 R   3. ? Strength: Patient will demo x10-15 SLR flexion consecutively of each LE in order to demo improved quad tolerance for standing, ambulation.- MET, able to complete 15x bilaterally   4. ? Function: Patient will report dec frequency of B LE buckling and falls with inc standing, ambulation to none over x6 visits for improved safety at home and in community- MET, denies recent knee buckling    5. Independent with Home Exercise Programs- Progress made, reports compliance however requires constant cueing in clinic   6. Demonstrate Knowledge of fall prevention MET, Fall prevention information printout given & reviewed on 7/15/19. Pt. with good understanding, but may need reminders for safety overall. LTG: (to be met in 12 treatments)  1. Patient will sit<>stand; stand 10 minutes; and ambulate 10 minutes with rollator with < 5/10 pain for improved functional mobility at home and in community. 2. Improve LEFS to 40/80         Patient goals: \"to feel better about myself. \"     Pt. Education:  [x] Yes  [] No  [] Reviewed Prior HEP/Ed  Method of Education: [x] Verbal  [x] Demo for charted exercises [] Written   Comprehension of Education:  [x] Verbalizes understanding. [x] Demonstrates understanding. [x] Needs review.  Of ex added this date and completion of ex without Statement Selected

## 2019-08-15 ENCOUNTER — HOSPITAL ENCOUNTER (OUTPATIENT)
Dept: PHYSICAL THERAPY | Facility: CLINIC | Age: 49
Setting detail: THERAPIES SERIES
Discharge: HOME | End: 2019-08-15
Payer: COMMERCIAL

## 2019-08-16 ENCOUNTER — HOSPITAL ENCOUNTER (OUTPATIENT)
Dept: PHYSICAL THERAPY | Facility: CLINIC | Age: 49
Setting detail: THERAPIES SERIES
Discharge: HOME | End: 2019-08-16
Payer: COMMERCIAL

## 2019-08-16 PROCEDURE — 97110 THERAPEUTIC EXERCISES: CPT

## 2019-08-16 NOTE — FLOWSHEET NOTE
[] [facility]  Outpatient Rehabilitation &  Therapy  [address]  [phone]  F: [phone] [x] [address]  [place] [address]   [address]  P: [phone]  F: [phone] [] [address]  Therapy  [address] [facility]  P: [phone]  F: [phone] [] [address]  [facility]   [address]  [place]: [phone]  F: [phone]     Physical Therapy Daily Treatment Note    Date:  2019  Patient Name:  Shaun Will    :  1970  MRN: [id number]  Physician: Dr. Daniel Epstein MD                  Insurance: [facility] 12 7/15/19-19  Medical Diagnosis: M17.0- primary osteoarthritis of both knees                  Rehab Codes: M17.0; M62.81; R26.2; Z91.81  Onset date:                      Next 's appt.: TBD- with pain management- referred for therapy  Visit# / total visits: 10/12   Cancels/No Shows: 0/0    Subjective:    Pain:  [x] Yes  [] No Location: B knees Pain Rating: (0-10 scale) 3/10  Pain altered Tx:  [x] No  [] Yes  Action: multiple rest breaks taken    Comments: Pt reported that knees are feeling pretty good today. Objective:  Modalities: pt declined today 19.  B Knee CPs for 10 min supine with a wedge for each LE  Precautions: B knee OA- symmetrical; intermittent B LE buckling; ambulates with rollator; FALL RISK; obese; mental handicap  Exercises: Bolded completed; Supine with x2 pillows and wedge*gait belt throughout session*  Exercise Reps/ Time Weight/ Level Comments   SCI-FIT/NUSTEP 8 min    Self paced, low tolerance   CALF STRETCH     Belt, added    PATELLA MOBS x    gentle             HS STRETCH 3x20\"  assist w/ belt In supine 2x   QUAD SETS 15x5\"   1#  Both at once for inc neuro-feedback; weight added 19   Hip abd-supine  AA Cues for foot in neutral-use slideboard next visit, added 8/9/19   HS SETS        HEEL SLIDES  15x AA   supine    SAQ'S 15x Foam roll under B knees 1# wt added 8/9/19   SLR 15x A      GLUTE SETS 15x5\"  With foam roll under knees 8/9                 SIDE HIP ABD 15x A Added 7/18   Reverse clamshells 15x A Added 8/9/19   clamshells 15x A Added 7/18   SIDE HIP ADD  ball squeeze Unable to complete SL 8/2                 PRONE            HIP EXT             HS CURLS                  SEATED      HEEL SLIDES    pillowcase    CALF STRETCH 2x20\"     belt   LAQ'S 15x   1#     MARCHES 15xea   1# Added weight 8/6   ANKLE PUMPS      home this date   Hip add with ball 15x5\"  Seated 8/9/19   Hip abd - 15x plum Seated 8/9/19                       STANDING   Flexed position and max UE support with all standing, 8/9/19   HEEL RAISES 15x      barely clearing heels 8/9/19   SQUATS - mini     MINI, max UE support - not 8/14/19 refuses to attempt .                  STEP UPS            STEPDOWNS                          TKE  Blueberry  Added 8/2   3 way hip 15xea 1# Added weight 8/6   HS CURLS  15xea  1# Added weight 8/6   Ambulation  1 lap  1 rest break; SOB after 8/9/19   Other: pt needs reminders to lock rolling walker prior to sit to stand transfers when using as assistance. Answers many questions with \"I'm not sure\"   Rest breaks required throughout session      Specific Instructions for next treatment: BLE Strength & Flexibility exercises, AAROM/PROM/AROM,  progress to standing/SLS activities as able, dynamic/static balance activities, vasocompression, gait training with rollator; and sit<>stand re-training from high mat table next visit. Treatment Charges: Mins Units   []  Modalities     [x]  Ther Exercise+ 38 3   []  Manual Therapy     []  Ther Activities     []  Aquatics     []  Vasocompression     []  Other CP     Total Treatment time 38 3       Assessment: [x] Progressing toward goals: Pt continues to have limited motion in LE, due to weakness.  Pt in need of breaks through

## 2019-08-19 ENCOUNTER — HOSPITAL ENCOUNTER (OUTPATIENT)
Dept: PHYSICAL THERAPY | Facility: CLINIC | Age: 49
Setting detail: THERAPIES SERIES
Discharge: HOME | End: 2019-08-19
Payer: COMMERCIAL

## 2019-08-19 PROCEDURE — 97110 THERAPEUTIC EXERCISES: CPT

## 2019-08-22 ENCOUNTER — HOSPITAL ENCOUNTER (OUTPATIENT)
Dept: PHYSICAL THERAPY | Facility: CLINIC | Age: 49
Setting detail: THERAPIES SERIES
Discharge: HOME | End: 2019-08-22
Payer: COMMERCIAL

## 2019-08-29 ENCOUNTER — HOSPITAL ENCOUNTER (OUTPATIENT)
Dept: PHYSICAL THERAPY | Facility: CLINIC | Age: 49
Setting detail: THERAPIES SERIES
Discharge: HOME | End: 2019-08-29
Payer: COMMERCIAL

## 2019-08-29 ENCOUNTER — APPOINTMENT (OUTPATIENT)
Dept: PHYSICAL THERAPY | Facility: CLINIC | Age: 49
End: 2019-08-29
Payer: COMMERCIAL

## 2019-09-06 ENCOUNTER — OFFICE VISIT (OUTPATIENT)
Dept: BARIATRICS/WEIGHT MGMT | Age: 49
End: 2019-09-06
Payer: COMMERCIAL

## 2019-09-06 VITALS
HEART RATE: 60 BPM | HEIGHT: 65 IN | DIASTOLIC BLOOD PRESSURE: 70 MMHG | SYSTOLIC BLOOD PRESSURE: 118 MMHG | WEIGHT: 293 LBS | BODY MASS INDEX: 48.82 KG/M2

## 2019-09-06 DIAGNOSIS — E78.5 HYPERLIPIDEMIA, UNSPECIFIED HYPERLIPIDEMIA TYPE: ICD-10-CM

## 2019-09-06 DIAGNOSIS — E28.2 PCOS (POLYCYSTIC OVARIAN SYNDROME): ICD-10-CM

## 2019-09-06 DIAGNOSIS — E11.9 DIABETES MELLITUS WITHOUT COMPLICATION (HCC): Primary | ICD-10-CM

## 2019-09-06 DIAGNOSIS — I10 ESSENTIAL HYPERTENSION: ICD-10-CM

## 2019-09-06 DIAGNOSIS — M19.90 ARTHRITIS: ICD-10-CM

## 2019-09-06 DIAGNOSIS — G89.29 BILATERAL CHRONIC KNEE PAIN: ICD-10-CM

## 2019-09-06 DIAGNOSIS — M25.561 BILATERAL CHRONIC KNEE PAIN: ICD-10-CM

## 2019-09-06 DIAGNOSIS — N39.41 URGE INCONTINENCE OF URINE: ICD-10-CM

## 2019-09-06 DIAGNOSIS — E66.01 MORBID OBESITY WITH BMI OF 50.0-59.9, ADULT (HCC): ICD-10-CM

## 2019-09-06 DIAGNOSIS — M25.562 BILATERAL CHRONIC KNEE PAIN: ICD-10-CM

## 2019-09-06 DIAGNOSIS — F20.9 SCHIZOPHRENIA, UNSPECIFIED TYPE (HCC): ICD-10-CM

## 2019-09-06 PROCEDURE — 99214 OFFICE O/P EST MOD 30 MIN: CPT | Performed by: NURSE PRACTITIONER

## 2019-09-06 PROCEDURE — 1036F TOBACCO NON-USER: CPT | Performed by: NURSE PRACTITIONER

## 2019-09-06 PROCEDURE — 3017F COLORECTAL CA SCREEN DOC REV: CPT | Performed by: NURSE PRACTITIONER

## 2019-09-06 PROCEDURE — 2022F DILAT RTA XM EVC RTNOPTHY: CPT | Performed by: NURSE PRACTITIONER

## 2019-09-06 PROCEDURE — G8417 CALC BMI ABV UP PARAM F/U: HCPCS | Performed by: NURSE PRACTITIONER

## 2019-09-06 PROCEDURE — 3044F HG A1C LEVEL LT 7.0%: CPT | Performed by: NURSE PRACTITIONER

## 2019-09-06 PROCEDURE — G8427 DOCREV CUR MEDS BY ELIG CLIN: HCPCS | Performed by: NURSE PRACTITIONER

## 2019-09-06 NOTE — PROGRESS NOTES
and Sexual Activity    Alcohol use: No    Drug use: No    Sexual activity: Never   Lifestyle    Physical activity:     Days per week: Not on file     Minutes per session: Not on file    Stress: Not on file   Relationships    Social connections:     Talks on phone: Not on file     Gets together: Not on file     Attends Adventism service: Not on file     Active member of club or organization: Not on file     Attends meetings of clubs or organizations: Not on file     Relationship status: Not on file    Intimate partner violence:     Fear of current or ex partner: Not on file     Emotionally abused: Not on file     Physically abused: Not on file     Forced sexual activity: Not on file   Other Topics Concern    Not on file   Social History Narrative    Not on file       Current Medications:  Current Outpatient Medications   Medication Sig Dispense Refill    gabapentin (NEURONTIN) 300 MG capsule Take 1 capsule by mouth. Raquel Darshana DICLOFENAC POTASSIUM PO Take 50 mg by mouth 3 times daily as needed      ARIPiprazole (ABILIFY) 15 MG tablet Take 15 mg by mouth nightly       traZODone (DESYREL) 50 MG tablet Take 50 mg by mouth nightly      pravastatin (PRAVACHOL) 80 MG tablet Take 80 mg by mouth nightly       oxybutynin (DITROPAN-XL) 10 MG extended release tablet Take 10 mg by mouth daily      potassium chloride (MICRO-K) 10 MEQ CR capsule Take 10 mEq by mouth daily.  valsartan (DIOVAN) 40 MG tablet Take 40 mg by mouth nightly Patient takes at night      metFORMIN (GLUCOPHAGE) 500 MG tablet Take 500 mg by mouth daily (with breakfast).       hydrochlorothiazide (MICROZIDE) 12.5 MG capsule Take 12.5 mg by mouth nightly       amLODIPine (NORVASC) 10 MG tablet Take 10 mg by mouth nightly Patient takes at night      diclofenac sodium (VOLTAREN) 1 % GEL Apply 4 g topically 4 times daily 4 Tube 1    Elastic Bandages & Supports (KNEE BRACE ADJUSTABLE HINGED) MISC Use as directed 1 each 0    Elastic Bandages & Conjunctivae normal. Neck supple. Cardiovascular: Normal rate, regular rhythm. Pulmonary/Chest: Normal effort. Lungs clear to auscultation. No rales, rhonchi or wheezing. Abdominal: Positive bowel sounds. Soft, nontender. Nondistended. Musculoskeletal: Movement x4. Bilateral lower extremity edema. Neurological: Gait normal. Alert and oriented to person, place, and time. Psychiatric: Normal mood and affect. Speech and behavior normal. Judgment and thought content normal. Cognition and memory intact. Assessment:       Diagnosis Orders   1. Diabetes mellitus without complication (HCC)  EKG 12 Lead   2. Urge incontinence of urine  EKG 12 Lead   3. Bilateral chronic knee pain  EKG 12 Lead   4. Arthritis  EKG 12 Lead   5. Schizophrenia, unspecified type ([address]  EKG 12 Lead   6. Essential hypertension  EKG 12 Lead   7. Hyperlipidemia, unspecified hyperlipidemia type  EKG 12 Lead   8. PCOS (polycystic ovarian syndrome)  EKG 12 Lead   9. Morbid obesity with BMI of 50.0-59.9, adult (HCC)  EKG 12 Lead       Plan:      Class schedule reviewed. Consent and confidentiality agreement discussed. Patient aware group medical appointment is voluntary and individual appointments are available as desired. X ? EKG ordered. X ? Lab work done 7/1/19 reviewed and acceptable. ?  Diabetic teaching done. Low blood sugar protocol reviewed with pt. ? Discussed targets and management of blood sugar, Hgb A1C, lipids, and blood pressure. ? Reviewed medications and discussed potential need for adjustments while following the program and losing weight. ?  Smoking cessation discussed. ? Avoidance of alcohol discussed. X ? Specific questions answered. Follow up:  Return in about 11 days (around 9/17/2019).     This encounters orders:  Orders Placed This Encounter   Procedures    EKG 12 Lead     Standing Status:   Future     Standing Expiration Date:   9/6/2020     Order Specific Question:   Reason

## 2019-09-10 ENCOUNTER — HOSPITAL ENCOUNTER (OUTPATIENT)
Dept: PHYSICAL THERAPY | Facility: CLINIC | Age: 49
Setting detail: THERAPIES SERIES
Discharge: HOME | End: 2019-09-10
Payer: COMMERCIAL

## 2019-09-10 PROCEDURE — 97110 THERAPEUTIC EXERCISES: CPT

## 2019-09-10 NOTE — FLOWSHEET NOTE
8/9/19   Hip abd-supine  AA Cues for foot in neutral-use slideboard next visit, added 8/9/19   HS SETS        HEEL SLIDES  15x AA   supine    SAQ'S 15x Foam roll under B knees 1# wt added 8/9/19   SLR 15x A      GLUTE SETS 15x5\"  With foam roll under knees 8/9                 SIDE HIP ABD 15x A Added 7/18   Reverse clamshells 15x A Added 8/9/19   clamshells 15x A Added 7/18   SIDE HIP ADD  ball squeeze Unable to complete SL 8/2                 PRONE            HIP EXT             HS CURLS                  SEATED      HEEL SLIDES    pillowcase    CALF STRETCH 2x20\"     belt   LAQ'S 15x   1#     MARCHES 15xea   1# Added weight 8/6   ANKLE PUMPS      home this date   Hip add with ball 15x5\"  Seated 8/9/19   Hip abd - 15x plum Seated 8/9/19                       STANDING   Flexed position and max UE support with all standing, 8/9/19   HEEL RAISES 15x      barely clearing heels 8/9/19   SQUATS - mini     MINI, max UE support- not 9/10, refuses                  STEP UPS            STEPDOWNS                          TKE  [place]berry  Added 8/2   3 way hip 15xea 1# Added weight 8/6   HS CURLS  15xea  1# Added weight 8/6   Ambulation  1 lap  1 rest break; SOB after 8/9/19   Other: pt needs reminders to lock rolling walker prior to sit to stand transfers when using as assistance. Answers many questions with \"I'm not sure\"   Rest breaks required throughout session      Specific Instructions for next treatment:       Treatment Charges: Mins Units   []  Modalities     [x]  Ther Exercise 45 3   []  Manual Therapy     []  Ther Activities     []  Aquatics     []  Vasocompression     []  Other CP     Total Treatment time 45 3       Assessment: [x] Progressing toward goals: Continues to demonstrate limited knee ROM. Per patient, patient has not been completing HEP prescribed because she has been \"doing laundry and dishes, which is exercise\".  Educated importance of continuing to perform HEP as light household chores are not enough to

## 2019-09-10 NOTE — DISCHARGE SUMMARY
[] [facility]) - [facility] &  Therapy  [address]  [phone]  F: [phone] [x] [address]  [facility]   [address]  P: [phone]  F: [phone] [] [address]  [address]  P: [phone]  F: [phone] [] [address]  [facility]   [address]   [place]: [phone]  F: [phone]     Physical Therapy Discharge Note    Date: 9/10/2019      Patient: Lizzy Bunch  : 1970  MRN: [id number]    Physician: Dr. Nathaneil Jim MD                  Insurance: [facility] 7/15/19-19  Medical Diagnosis: M17.0- primary osteoarthritis of both knees                  Rehab Codes: M17.0; M62.81; R26.2; Z91.81  Onset date:                                        Next 's appt.: TBD- with pain management- referred for therapy  Visit# / total visits:                               Cancels/No Shows: 1/3  Date of initial visit: 19                Date of final visit: 9/10/19       Subjective:    Pain:  [x] Yes  [] No          Location: B knees       Pain Rating: (0-10 scale) 2/10  Pain altered Tx:  [x] No  [] Yes  Action: multiple rest breaks taken  Comments: Pt states she is doing pretty good. Ambulates without her walker on occasion despite encouragement to use it for community ambulation. Non compliant with HEP. Assessment:  [] Progressing toward goals                          [x] No change. Continues to demonstrate limited knee ROM. Per patient, patient has not been completing HEP prescribed because she has been \"doing laundry and dishes, which is exercise\". Educated importance of continuing to perform HEP as light household chores are not enough to keep her BLEs strong. Pt unwilling to complete mini squats this date.  Poor endurance still noted; pt requires 2 rest breaks during minimal standing exercises completed. At this time, patient's progress has hit a plateau and she is discharged. [] Other:         STG: (to be met in 6 treatments)  1. ? Pain: Patient will report < 3/10 pain at rest and < 8/10 pain with active movement- standing, ambulation- in order to improve QOL. -MET, reports 2/10 average, 5/10 with active movement  2. ? ROM: Patient will demo +5-10 deg AROM flexion of both LEs for improved ease of transfers.- NOT MET, 100 L, 94 R   3. ? Strength: Patient will demo x10-15 SLR flexion consecutively of each LE in order to demo improved quad tolerance for standing, ambulation.- MET, able to complete 15x bilaterally   4. ? Function: Patient will report dec frequency of B LE buckling and falls with inc standing, ambulation to none over x6 visits for improved safety at home and in community- MET, denies recent knee buckling    5. Independent with Home Exercise Programs- Progress made, reports compliance however requires constant cueing in clinic   6. Demonstrate Knowledge of fall prevention MET, Fall prevention information printout given & reviewed on 7/15/19. Pt. with good understanding, but may need reminders for safety overall.      LTG: (to be met in 12 treatments)  1. Patient will sit<>stand; stand 10 minutes; and ambulate 10 minutes with rollator with < 5/10 pain for improved functional mobility at home and in community.- Progress made, walks with lower pain however cannot walk 10 min straight   2.  Improve LEFS to 40/80 Met 8/19/19  40/80       Treatment to Date:  [x] Therapeutic Exercise    [x] Modalities:  [x] Therapeutic Activity    [] Ultrasound  [] Electrical Stimulation  [x] Gait Training     [] Massage       [] Lumbar/Cervical Traction  [] Neuromuscular Re-education [x] Cold/hotpack [] Iontophoresis: 4 mg/mL  [x] Instruction in Home Exercise Program                     Dexamethasone Sodium  [] Manual Therapy

## 2019-09-11 ENCOUNTER — HOSPITAL ENCOUNTER (OUTPATIENT)
Age: 49
Discharge: HOME | End: 2019-09-11
Payer: COMMERCIAL

## 2019-09-11 PROCEDURE — 93005 ELECTROCARDIOGRAM TRACING: CPT | Performed by: NURSE PRACTITIONER

## 2019-09-12 ENCOUNTER — TELEPHONE (OUTPATIENT)
Dept: GASTROENTEROLOGY | Age: 49
End: 2019-09-12

## 2019-09-12 DIAGNOSIS — Z12.11 SCREENING FOR COLON CANCER: Primary | ICD-10-CM

## 2019-09-12 LAB
EKG ATRIAL RATE: 70 BPM
EKG P AXIS: 62 DEGREES
EKG P-R INTERVAL: 178 MS
EKG Q-T INTERVAL: 380 MS
EKG QRS DURATION: 92 MS
EKG QTC CALCULATION (BAZETT): 410 MS
EKG R AXIS: 16 DEGREES
EKG T AXIS: 31 DEGREES
EKG VENTRICULAR RATE: 70 BPM

## 2019-09-12 PROCEDURE — 93010 ELECTROCARDIOGRAM REPORT: CPT | Performed by: INTERNAL MEDICINE

## 2019-09-12 NOTE — TELEPHONE ENCOUNTER
Returned call to Vicky, Completed NP Questionnaire, scheduled with DR. Beryl Padilla at [facility] Thursday 9/18/2019 @ 9:30am w/arrival @ 8:00am   Bowel prep will be sent to the pharmacy, bowel prep instructions were reviewed with the patient, who wrote them down, patient requested they be mailed, writer explained they may not make it in time, patient explained she does not check her emails very much, patient wrote instructions down and reviewed again to be sure they were correct.

## 2019-09-17 ENCOUNTER — OFFICE VISIT (OUTPATIENT)
Dept: BARIATRICS/WEIGHT MGMT | Age: 49
End: 2019-09-17
Payer: COMMERCIAL

## 2019-09-17 ENCOUNTER — TELEPHONE (OUTPATIENT)
Dept: GASTROENTEROLOGY | Age: 49
End: 2019-09-17

## 2019-09-17 VITALS
WEIGHT: 293 LBS | HEIGHT: 65 IN | HEART RATE: 76 BPM | SYSTOLIC BLOOD PRESSURE: 128 MMHG | BODY MASS INDEX: 48.82 KG/M2 | DIASTOLIC BLOOD PRESSURE: 74 MMHG

## 2019-09-17 DIAGNOSIS — E78.5 HYPERLIPIDEMIA, UNSPECIFIED HYPERLIPIDEMIA TYPE: ICD-10-CM

## 2019-09-17 DIAGNOSIS — I10 ESSENTIAL HYPERTENSION: ICD-10-CM

## 2019-09-17 DIAGNOSIS — M25.562 BILATERAL CHRONIC KNEE PAIN: ICD-10-CM

## 2019-09-17 DIAGNOSIS — G89.29 CHRONIC BACK PAIN, UNSPECIFIED BACK LOCATION, UNSPECIFIED BACK PAIN LATERALITY: ICD-10-CM

## 2019-09-17 DIAGNOSIS — E11.9 DIABETES MELLITUS WITHOUT COMPLICATION (HCC): Primary | ICD-10-CM

## 2019-09-17 DIAGNOSIS — G89.29 BILATERAL CHRONIC KNEE PAIN: ICD-10-CM

## 2019-09-17 DIAGNOSIS — E28.2 PCOS (POLYCYSTIC OVARIAN SYNDROME): ICD-10-CM

## 2019-09-17 DIAGNOSIS — E66.01 MORBID OBESITY WITH BMI OF 50.0-59.9, ADULT (HCC): ICD-10-CM

## 2019-09-17 DIAGNOSIS — M25.561 BILATERAL CHRONIC KNEE PAIN: ICD-10-CM

## 2019-09-17 DIAGNOSIS — R73.03 PREDIABETES: ICD-10-CM

## 2019-09-17 DIAGNOSIS — F20.9 SCHIZOPHRENIA, UNSPECIFIED TYPE (HCC): ICD-10-CM

## 2019-09-17 DIAGNOSIS — N39.41 URGE INCONTINENCE OF URINE: ICD-10-CM

## 2019-09-17 DIAGNOSIS — M17.0 PRIMARY OSTEOARTHRITIS OF BOTH KNEES: Chronic | ICD-10-CM

## 2019-09-17 DIAGNOSIS — M54.9 CHRONIC BACK PAIN, UNSPECIFIED BACK LOCATION, UNSPECIFIED BACK PAIN LATERALITY: ICD-10-CM

## 2019-09-17 DIAGNOSIS — F32.A DEPRESSION, UNSPECIFIED DEPRESSION TYPE: ICD-10-CM

## 2019-09-17 PROCEDURE — 1036F TOBACCO NON-USER: CPT | Performed by: NURSE PRACTITIONER

## 2019-09-17 PROCEDURE — 99213 OFFICE O/P EST LOW 20 MIN: CPT | Performed by: NURSE PRACTITIONER

## 2019-09-17 PROCEDURE — 2022F DILAT RTA XM EVC RTNOPTHY: CPT | Performed by: NURSE PRACTITIONER

## 2019-09-17 PROCEDURE — G8417 CALC BMI ABV UP PARAM F/U: HCPCS | Performed by: NURSE PRACTITIONER

## 2019-09-17 PROCEDURE — G8427 DOCREV CUR MEDS BY ELIG CLIN: HCPCS | Performed by: NURSE PRACTITIONER

## 2019-09-17 PROCEDURE — 3044F HG A1C LEVEL LT 7.0%: CPT | Performed by: NURSE PRACTITIONER

## 2019-09-17 PROCEDURE — 3017F COLORECTAL CA SCREEN DOC REV: CPT | Performed by: NURSE PRACTITIONER

## 2019-09-17 NOTE — PROGRESS NOTES
Back and Looking Forward    Total time:  90 minutes, greater than 50% of visit spent in group counseling/education. Assessment:       Diagnosis Orders   1. Diabetes mellitus without complication ([facility]     2. Essential hypertension     3. Primary osteoarthritis of both knees     4. Hyperlipidemia, unspecified hyperlipidemia type     5. Chronic back pain, unspecified back location, unspecified back pain laterality     6. Morbid obesity with BMI of 50.0-59.9, adult ([facility]     7. PCOS (polycystic ovarian syndrome)     8. Schizophrenia, unspecified type ([facility]     9. Depression, unspecified depression type     10. Prediabetes     11. Bilateral chronic knee pain     12. Urge incontinence of urine         Plan:      Track food and weight. Return to clinic as per group medical appointment schedule. Follow-up:  Return in about 1 week (around 9/24/2019). Orders:  No orders of the defined types were placed in this encounter. Prescriptions:  No orders of the defined types were placed in this encounter.       Electronically signed by:  Sherrilee Mcburney, CNP

## 2019-09-24 ENCOUNTER — OFFICE VISIT (OUTPATIENT)
Dept: BARIATRICS/WEIGHT MGMT | Age: 49
End: 2019-09-24
Payer: COMMERCIAL

## 2019-09-24 VITALS
DIASTOLIC BLOOD PRESSURE: 78 MMHG | SYSTOLIC BLOOD PRESSURE: 126 MMHG | BODY MASS INDEX: 48.82 KG/M2 | HEIGHT: 65 IN | HEART RATE: 82 BPM | WEIGHT: 293 LBS

## 2019-09-24 DIAGNOSIS — M25.562 BILATERAL CHRONIC KNEE PAIN: ICD-10-CM

## 2019-09-24 DIAGNOSIS — G89.29 BILATERAL CHRONIC KNEE PAIN: ICD-10-CM

## 2019-09-24 DIAGNOSIS — I10 ESSENTIAL HYPERTENSION: Primary | ICD-10-CM

## 2019-09-24 DIAGNOSIS — E66.01 MORBID OBESITY WITH BMI OF 50.0-59.9, ADULT (HCC): ICD-10-CM

## 2019-09-24 DIAGNOSIS — M17.0 PRIMARY OSTEOARTHRITIS OF BOTH KNEES: Chronic | ICD-10-CM

## 2019-09-24 DIAGNOSIS — M25.561 BILATERAL CHRONIC KNEE PAIN: ICD-10-CM

## 2019-09-24 DIAGNOSIS — R73.03 PREDIABETES: ICD-10-CM

## 2019-09-24 DIAGNOSIS — F32.A DEPRESSION, UNSPECIFIED DEPRESSION TYPE: ICD-10-CM

## 2019-09-24 DIAGNOSIS — M19.90 ARTHRITIS: ICD-10-CM

## 2019-09-24 DIAGNOSIS — G89.29 CHRONIC BACK PAIN, UNSPECIFIED BACK LOCATION, UNSPECIFIED BACK PAIN LATERALITY: ICD-10-CM

## 2019-09-24 DIAGNOSIS — E28.2 PCOS (POLYCYSTIC OVARIAN SYNDROME): ICD-10-CM

## 2019-09-24 DIAGNOSIS — M54.9 CHRONIC BACK PAIN, UNSPECIFIED BACK LOCATION, UNSPECIFIED BACK PAIN LATERALITY: ICD-10-CM

## 2019-09-24 DIAGNOSIS — E78.5 HYPERLIPIDEMIA, UNSPECIFIED HYPERLIPIDEMIA TYPE: ICD-10-CM

## 2019-09-24 PROCEDURE — 3017F COLORECTAL CA SCREEN DOC REV: CPT | Performed by: NURSE PRACTITIONER

## 2019-09-24 PROCEDURE — 1036F TOBACCO NON-USER: CPT | Performed by: NURSE PRACTITIONER

## 2019-09-24 PROCEDURE — 99213 OFFICE O/P EST LOW 20 MIN: CPT | Performed by: NURSE PRACTITIONER

## 2019-09-24 PROCEDURE — G8427 DOCREV CUR MEDS BY ELIG CLIN: HCPCS | Performed by: NURSE PRACTITIONER

## 2019-09-24 PROCEDURE — G8417 CALC BMI ABV UP PARAM F/U: HCPCS | Performed by: NURSE PRACTITIONER

## 2019-09-24 NOTE — PROGRESS NOTES
Yaritza Gutierrez MD at [address] [facility] NERV Bilateral 6/10/2019    INJECTION SYNVISC FLUORO GUIDED FABIOLA KNEES performed by Yobani Avelar MD at [facility] HYSTEROSCOPY  11/14/2017    exc endometrial polyps    OTHER SURGICAL HISTORY Right 10/17/14    excision dorsal exostosis rt foot    OTHER SURGICAL HISTORY Bilateral 06/10/2019    INJECTION SYNVISC FLUORO GUIDED FABIOLA KNEES (Bilateral )       Family History:  Family History   Problem Relation Age of Onset    Diabetes Mother     Heart Failure Mother     Diabetes Maternal Grandmother     Heart Failure Maternal Grandmother     No Known Problems Maternal Grandfather     No Known Problems Paternal Grandmother     No Known Problems Paternal Grandfather     Cancer Paternal Aunt        Social History:  Social History     Socioeconomic History    Marital status: Single     Spouse name: Not on file    Number of children: 0    Years of education: Not on file    Highest education level: Not on file   Occupational History    Not on file   Social Needs    Financial resource strain: Not on file    Food insecurity:     Worry: Not on file     Inability: Not on file    Transportation needs:     Medical: Not on file     Non-medical: Not on file   Tobacco Use    Smoking status: Passive Smoke Exposure - Never Smoker    Smokeless tobacco: Never Used    Tobacco comment: associates smoking   Substance and Sexual Activity    Alcohol use: No    Drug use: No    Sexual activity: Never   Lifestyle    Physical activity:     Days per week: Not on file     Minutes per session: Not on file    Stress: Not on file   Relationships    Social connections:     Talks on phone: Not on file     Gets together: Not on file     Attends Scientologist service: Not on file     Active member of club or organization: Not on file     Attends meetings of clubs or organizations: Not on file     Relationship status: Not on file    Intimate partner violence:     Fear of current or ex

## 2019-09-25 ENCOUNTER — TELEPHONE (OUTPATIENT)
Dept: GASTROENTEROLOGY | Age: 49
End: 2019-09-25

## 2019-09-30 NOTE — TELEPHONE ENCOUNTER
Pt called in inquiring about what day she was scheduled for her colon proc. Writer gave pt of correct date and time and pt verbalizes her understanding.

## 2019-10-04 ENCOUNTER — OFFICE VISIT (OUTPATIENT)
Dept: BARIATRICS/WEIGHT MGMT | Age: 49
End: 2019-10-04
Payer: COMMERCIAL

## 2019-10-04 ENCOUNTER — OFFICE VISIT (OUTPATIENT)
Dept: PAIN MANAGEMENT | Age: 49
End: 2019-10-04
Payer: COMMERCIAL

## 2019-10-04 ENCOUNTER — TELEPHONE (OUTPATIENT)
Dept: GASTROENTEROLOGY | Age: 49
End: 2019-10-04

## 2019-10-04 VITALS
HEIGHT: 65 IN | WEIGHT: 293 LBS | DIASTOLIC BLOOD PRESSURE: 74 MMHG | HEART RATE: 84 BPM | BODY MASS INDEX: 48.82 KG/M2 | SYSTOLIC BLOOD PRESSURE: 122 MMHG

## 2019-10-04 VITALS
WEIGHT: 293 LBS | DIASTOLIC BLOOD PRESSURE: 78 MMHG | BODY MASS INDEX: 48.82 KG/M2 | SYSTOLIC BLOOD PRESSURE: 126 MMHG | OXYGEN SATURATION: 96 % | HEART RATE: 67 BPM | HEIGHT: 65 IN

## 2019-10-04 DIAGNOSIS — E28.2 PCOS (POLYCYSTIC OVARIAN SYNDROME): ICD-10-CM

## 2019-10-04 DIAGNOSIS — N39.41 URGE INCONTINENCE OF URINE: ICD-10-CM

## 2019-10-04 DIAGNOSIS — M54.9 CHRONIC BACK PAIN, UNSPECIFIED BACK LOCATION, UNSPECIFIED BACK PAIN LATERALITY: ICD-10-CM

## 2019-10-04 DIAGNOSIS — M25.561 BILATERAL CHRONIC KNEE PAIN: ICD-10-CM

## 2019-10-04 DIAGNOSIS — M25.562 BILATERAL CHRONIC KNEE PAIN: ICD-10-CM

## 2019-10-04 DIAGNOSIS — M19.90 ARTHRITIS: ICD-10-CM

## 2019-10-04 DIAGNOSIS — E11.69 DIABETES MELLITUS TYPE 2 IN OBESE (HCC): Primary | ICD-10-CM

## 2019-10-04 DIAGNOSIS — I10 ESSENTIAL HYPERTENSION: ICD-10-CM

## 2019-10-04 DIAGNOSIS — M17.0 PRIMARY OSTEOARTHRITIS OF BOTH KNEES: ICD-10-CM

## 2019-10-04 DIAGNOSIS — M17.0 PRIMARY OSTEOARTHRITIS OF BOTH KNEES: Chronic | ICD-10-CM

## 2019-10-04 DIAGNOSIS — F20.9 SCHIZOPHRENIA, UNSPECIFIED TYPE (HCC): ICD-10-CM

## 2019-10-04 DIAGNOSIS — E78.5 HYPERLIPIDEMIA, UNSPECIFIED HYPERLIPIDEMIA TYPE: ICD-10-CM

## 2019-10-04 DIAGNOSIS — E66.9 DIABETES MELLITUS TYPE 2 IN OBESE (HCC): Primary | ICD-10-CM

## 2019-10-04 DIAGNOSIS — G89.29 CHRONIC BACK PAIN, UNSPECIFIED BACK LOCATION, UNSPECIFIED BACK PAIN LATERALITY: ICD-10-CM

## 2019-10-04 DIAGNOSIS — G89.29 BILATERAL CHRONIC KNEE PAIN: ICD-10-CM

## 2019-10-04 PROCEDURE — 3044F HG A1C LEVEL LT 7.0%: CPT | Performed by: NURSE PRACTITIONER

## 2019-10-04 PROCEDURE — 3017F COLORECTAL CA SCREEN DOC REV: CPT | Performed by: NURSE PRACTITIONER

## 2019-10-04 PROCEDURE — 99214 OFFICE O/P EST MOD 30 MIN: CPT | Performed by: NURSE PRACTITIONER

## 2019-10-04 PROCEDURE — 99213 OFFICE O/P EST LOW 20 MIN: CPT | Performed by: NURSE PRACTITIONER

## 2019-10-04 PROCEDURE — 2022F DILAT RTA XM EVC RTNOPTHY: CPT | Performed by: NURSE PRACTITIONER

## 2019-10-04 PROCEDURE — G8417 CALC BMI ABV UP PARAM F/U: HCPCS | Performed by: NURSE PRACTITIONER

## 2019-10-04 PROCEDURE — 1036F TOBACCO NON-USER: CPT | Performed by: NURSE PRACTITIONER

## 2019-10-04 PROCEDURE — G8427 DOCREV CUR MEDS BY ELIG CLIN: HCPCS | Performed by: NURSE PRACTITIONER

## 2019-10-04 PROCEDURE — G8484 FLU IMMUNIZE NO ADMIN: HCPCS | Performed by: NURSE PRACTITIONER

## 2019-10-04 ASSESSMENT — ENCOUNTER SYMPTOMS
RESPIRATORY NEGATIVE: 1
CONSTIPATION: 0
COUGH: 0
SHORTNESS OF BREATH: 0

## 2019-10-11 ENCOUNTER — OFFICE VISIT (OUTPATIENT)
Dept: BARIATRICS/WEIGHT MGMT | Age: 49
End: 2019-10-11
Payer: COMMERCIAL

## 2019-10-11 VITALS
SYSTOLIC BLOOD PRESSURE: 128 MMHG | HEIGHT: 65 IN | BODY MASS INDEX: 48.82 KG/M2 | HEART RATE: 82 BPM | DIASTOLIC BLOOD PRESSURE: 80 MMHG | WEIGHT: 293 LBS

## 2019-10-11 DIAGNOSIS — M19.90 ARTHRITIS: ICD-10-CM

## 2019-10-11 DIAGNOSIS — I10 ESSENTIAL HYPERTENSION: Primary | ICD-10-CM

## 2019-10-11 DIAGNOSIS — M54.9 CHRONIC BACK PAIN, UNSPECIFIED BACK LOCATION, UNSPECIFIED BACK PAIN LATERALITY: ICD-10-CM

## 2019-10-11 DIAGNOSIS — G89.29 BILATERAL CHRONIC KNEE PAIN: ICD-10-CM

## 2019-10-11 DIAGNOSIS — F32.A DEPRESSION, UNSPECIFIED DEPRESSION TYPE: ICD-10-CM

## 2019-10-11 DIAGNOSIS — M25.562 BILATERAL CHRONIC KNEE PAIN: ICD-10-CM

## 2019-10-11 DIAGNOSIS — E66.9 DIABETES MELLITUS TYPE 2 IN OBESE (HCC): ICD-10-CM

## 2019-10-11 DIAGNOSIS — E11.69 DIABETES MELLITUS TYPE 2 IN OBESE (HCC): ICD-10-CM

## 2019-10-11 DIAGNOSIS — F20.9 SCHIZOPHRENIA, UNSPECIFIED TYPE (HCC): ICD-10-CM

## 2019-10-11 DIAGNOSIS — E78.5 HYPERLIPIDEMIA, UNSPECIFIED HYPERLIPIDEMIA TYPE: ICD-10-CM

## 2019-10-11 DIAGNOSIS — E28.2 PCOS (POLYCYSTIC OVARIAN SYNDROME): ICD-10-CM

## 2019-10-11 DIAGNOSIS — E66.01 MORBID OBESITY WITH BMI OF 50.0-59.9, ADULT (HCC): ICD-10-CM

## 2019-10-11 DIAGNOSIS — N39.41 URGE INCONTINENCE OF URINE: ICD-10-CM

## 2019-10-11 DIAGNOSIS — M25.561 BILATERAL CHRONIC KNEE PAIN: ICD-10-CM

## 2019-10-11 DIAGNOSIS — M17.0 PRIMARY OSTEOARTHRITIS OF BOTH KNEES: Chronic | ICD-10-CM

## 2019-10-11 DIAGNOSIS — G89.29 CHRONIC BACK PAIN, UNSPECIFIED BACK LOCATION, UNSPECIFIED BACK PAIN LATERALITY: ICD-10-CM

## 2019-10-11 PROBLEM — R73.03 PREDIABETES: Status: RESOLVED | Noted: 2018-01-11 | Resolved: 2019-10-11

## 2019-10-11 PROCEDURE — 2022F DILAT RTA XM EVC RTNOPTHY: CPT | Performed by: NURSE PRACTITIONER

## 2019-10-11 PROCEDURE — 3044F HG A1C LEVEL LT 7.0%: CPT | Performed by: NURSE PRACTITIONER

## 2019-10-11 PROCEDURE — 99213 OFFICE O/P EST LOW 20 MIN: CPT | Performed by: NURSE PRACTITIONER

## 2019-10-11 PROCEDURE — 3017F COLORECTAL CA SCREEN DOC REV: CPT | Performed by: NURSE PRACTITIONER

## 2019-10-11 PROCEDURE — 1036F TOBACCO NON-USER: CPT | Performed by: NURSE PRACTITIONER

## 2019-10-11 PROCEDURE — G8484 FLU IMMUNIZE NO ADMIN: HCPCS | Performed by: NURSE PRACTITIONER

## 2019-10-11 PROCEDURE — G8417 CALC BMI ABV UP PARAM F/U: HCPCS | Performed by: NURSE PRACTITIONER

## 2019-10-11 PROCEDURE — G8427 DOCREV CUR MEDS BY ELIG CLIN: HCPCS | Performed by: NURSE PRACTITIONER

## 2019-10-14 ENCOUNTER — OFFICE VISIT (OUTPATIENT)
Dept: DERMATOLOGY | Age: 49
End: 2019-10-14
Payer: COMMERCIAL

## 2019-10-14 VITALS — HEIGHT: 64 IN | OXYGEN SATURATION: 100 % | HEART RATE: 63 BPM | WEIGHT: 293 LBS | BODY MASS INDEX: 50.02 KG/M2

## 2019-10-14 DIAGNOSIS — L82.1 DERMATOSIS PAPULOSA NIGRA: ICD-10-CM

## 2019-10-14 DIAGNOSIS — L81.9 DYSCHROMIA: Primary | ICD-10-CM

## 2019-10-14 DIAGNOSIS — L30.4 INTERTRIGO: ICD-10-CM

## 2019-10-14 PROCEDURE — 3017F COLORECTAL CA SCREEN DOC REV: CPT | Performed by: DERMATOLOGY

## 2019-10-14 PROCEDURE — G8427 DOCREV CUR MEDS BY ELIG CLIN: HCPCS | Performed by: DERMATOLOGY

## 2019-10-14 PROCEDURE — 99202 OFFICE O/P NEW SF 15 MIN: CPT | Performed by: DERMATOLOGY

## 2019-10-14 PROCEDURE — 1036F TOBACCO NON-USER: CPT | Performed by: DERMATOLOGY

## 2019-10-14 PROCEDURE — G8484 FLU IMMUNIZE NO ADMIN: HCPCS | Performed by: DERMATOLOGY

## 2019-10-14 PROCEDURE — G8417 CALC BMI ABV UP PARAM F/U: HCPCS | Performed by: DERMATOLOGY

## 2019-10-14 RX ORDER — TRIAMCINOLONE ACETONIDE 0.25 MG/G
CREAM TOPICAL
Qty: 80 G | Refills: 2 | Status: SHIPPED | OUTPATIENT
Start: 2019-10-14

## 2019-10-14 RX ORDER — TRAVOPROST 0.004 %
DROPS OPHTHALMIC (EYE)
Refills: 1 | COMMUNITY
Start: 2019-10-04

## 2019-10-18 ENCOUNTER — OFFICE VISIT (OUTPATIENT)
Dept: BARIATRICS/WEIGHT MGMT | Age: 49
End: 2019-10-18
Payer: COMMERCIAL

## 2019-10-18 VITALS
BODY MASS INDEX: 50.02 KG/M2 | WEIGHT: 293 LBS | SYSTOLIC BLOOD PRESSURE: 138 MMHG | DIASTOLIC BLOOD PRESSURE: 82 MMHG | HEART RATE: 84 BPM | HEIGHT: 64 IN

## 2019-10-18 DIAGNOSIS — M54.9 CHRONIC BACK PAIN, UNSPECIFIED BACK LOCATION, UNSPECIFIED BACK PAIN LATERALITY: ICD-10-CM

## 2019-10-18 DIAGNOSIS — M25.562 BILATERAL CHRONIC KNEE PAIN: ICD-10-CM

## 2019-10-18 DIAGNOSIS — E66.9 DIABETES MELLITUS TYPE 2 IN OBESE (HCC): Primary | ICD-10-CM

## 2019-10-18 DIAGNOSIS — E78.5 HYPERLIPIDEMIA, UNSPECIFIED HYPERLIPIDEMIA TYPE: ICD-10-CM

## 2019-10-18 DIAGNOSIS — F20.9 SCHIZOPHRENIA, UNSPECIFIED TYPE (HCC): ICD-10-CM

## 2019-10-18 DIAGNOSIS — G89.29 CHRONIC BACK PAIN, UNSPECIFIED BACK LOCATION, UNSPECIFIED BACK PAIN LATERALITY: ICD-10-CM

## 2019-10-18 DIAGNOSIS — I10 ESSENTIAL HYPERTENSION: ICD-10-CM

## 2019-10-18 DIAGNOSIS — N39.41 URGE INCONTINENCE OF URINE: ICD-10-CM

## 2019-10-18 DIAGNOSIS — M17.0 PRIMARY OSTEOARTHRITIS OF BOTH KNEES: Chronic | ICD-10-CM

## 2019-10-18 DIAGNOSIS — E66.01 MORBID OBESITY WITH BMI OF 60.0-69.9, ADULT (HCC): ICD-10-CM

## 2019-10-18 DIAGNOSIS — E11.69 DIABETES MELLITUS TYPE 2 IN OBESE (HCC): Primary | ICD-10-CM

## 2019-10-18 DIAGNOSIS — G89.29 BILATERAL CHRONIC KNEE PAIN: ICD-10-CM

## 2019-10-18 DIAGNOSIS — E28.2 PCOS (POLYCYSTIC OVARIAN SYNDROME): ICD-10-CM

## 2019-10-18 DIAGNOSIS — M19.90 ARTHRITIS: ICD-10-CM

## 2019-10-18 DIAGNOSIS — M25.561 BILATERAL CHRONIC KNEE PAIN: ICD-10-CM

## 2019-10-18 PROCEDURE — 99213 OFFICE O/P EST LOW 20 MIN: CPT | Performed by: NURSE PRACTITIONER

## 2019-10-18 PROCEDURE — G8427 DOCREV CUR MEDS BY ELIG CLIN: HCPCS | Performed by: NURSE PRACTITIONER

## 2019-10-18 PROCEDURE — 2022F DILAT RTA XM EVC RTNOPTHY: CPT | Performed by: NURSE PRACTITIONER

## 2019-10-18 PROCEDURE — 3044F HG A1C LEVEL LT 7.0%: CPT | Performed by: NURSE PRACTITIONER

## 2019-10-18 PROCEDURE — 3017F COLORECTAL CA SCREEN DOC REV: CPT | Performed by: NURSE PRACTITIONER

## 2019-10-18 PROCEDURE — G8484 FLU IMMUNIZE NO ADMIN: HCPCS | Performed by: NURSE PRACTITIONER

## 2019-10-18 PROCEDURE — G8417 CALC BMI ABV UP PARAM F/U: HCPCS | Performed by: NURSE PRACTITIONER

## 2019-10-18 PROCEDURE — 1036F TOBACCO NON-USER: CPT | Performed by: NURSE PRACTITIONER

## 2019-10-25 ENCOUNTER — OFFICE VISIT (OUTPATIENT)
Dept: BARIATRICS/WEIGHT MGMT | Age: 49
End: 2019-10-25
Payer: COMMERCIAL

## 2019-10-25 VITALS
HEART RATE: 76 BPM | DIASTOLIC BLOOD PRESSURE: 80 MMHG | HEIGHT: 64 IN | SYSTOLIC BLOOD PRESSURE: 134 MMHG | BODY MASS INDEX: 50.02 KG/M2 | WEIGHT: 293 LBS

## 2019-10-25 DIAGNOSIS — M25.562 BILATERAL CHRONIC KNEE PAIN: ICD-10-CM

## 2019-10-25 DIAGNOSIS — E66.01 MORBID OBESITY WITH BMI OF 60.0-69.9, ADULT (HCC): ICD-10-CM

## 2019-10-25 DIAGNOSIS — M25.561 BILATERAL CHRONIC KNEE PAIN: ICD-10-CM

## 2019-10-25 DIAGNOSIS — E28.2 PCOS (POLYCYSTIC OVARIAN SYNDROME): ICD-10-CM

## 2019-10-25 DIAGNOSIS — M19.90 ARTHRITIS: ICD-10-CM

## 2019-10-25 DIAGNOSIS — E66.9 DIABETES MELLITUS TYPE 2 IN OBESE (HCC): ICD-10-CM

## 2019-10-25 DIAGNOSIS — G89.29 CHRONIC BACK PAIN, UNSPECIFIED BACK LOCATION, UNSPECIFIED BACK PAIN LATERALITY: ICD-10-CM

## 2019-10-25 DIAGNOSIS — F20.9 SCHIZOPHRENIA, UNSPECIFIED TYPE (HCC): ICD-10-CM

## 2019-10-25 DIAGNOSIS — I10 ESSENTIAL HYPERTENSION: ICD-10-CM

## 2019-10-25 DIAGNOSIS — E11.69 DIABETES MELLITUS TYPE 2 IN OBESE (HCC): ICD-10-CM

## 2019-10-25 DIAGNOSIS — M17.0 PRIMARY OSTEOARTHRITIS OF BOTH KNEES: Primary | Chronic | ICD-10-CM

## 2019-10-25 DIAGNOSIS — G89.29 BILATERAL CHRONIC KNEE PAIN: ICD-10-CM

## 2019-10-25 DIAGNOSIS — E78.5 HYPERLIPIDEMIA, UNSPECIFIED HYPERLIPIDEMIA TYPE: ICD-10-CM

## 2019-10-25 DIAGNOSIS — M54.9 CHRONIC BACK PAIN, UNSPECIFIED BACK LOCATION, UNSPECIFIED BACK PAIN LATERALITY: ICD-10-CM

## 2019-10-25 PROCEDURE — 2022F DILAT RTA XM EVC RTNOPTHY: CPT | Performed by: NURSE PRACTITIONER

## 2019-10-25 PROCEDURE — G8484 FLU IMMUNIZE NO ADMIN: HCPCS | Performed by: NURSE PRACTITIONER

## 2019-10-25 PROCEDURE — 1036F TOBACCO NON-USER: CPT | Performed by: NURSE PRACTITIONER

## 2019-10-25 PROCEDURE — 3044F HG A1C LEVEL LT 7.0%: CPT | Performed by: NURSE PRACTITIONER

## 2019-10-25 PROCEDURE — 99213 OFFICE O/P EST LOW 20 MIN: CPT | Performed by: NURSE PRACTITIONER

## 2019-10-25 PROCEDURE — 3017F COLORECTAL CA SCREEN DOC REV: CPT | Performed by: NURSE PRACTITIONER

## 2019-10-25 PROCEDURE — G8427 DOCREV CUR MEDS BY ELIG CLIN: HCPCS | Performed by: NURSE PRACTITIONER

## 2019-10-25 PROCEDURE — G8417 CALC BMI ABV UP PARAM F/U: HCPCS | Performed by: NURSE PRACTITIONER

## 2019-10-30 ENCOUNTER — OFFICE VISIT (OUTPATIENT)
Dept: PULMONOLOGY | Age: 49
End: 2019-10-30
Payer: COMMERCIAL

## 2019-10-30 VITALS
BODY MASS INDEX: 48.82 KG/M2 | SYSTOLIC BLOOD PRESSURE: 115 MMHG | DIASTOLIC BLOOD PRESSURE: 65 MMHG | HEIGHT: 65 IN | WEIGHT: 293 LBS | OXYGEN SATURATION: 98 % | RESPIRATION RATE: 16 BRPM | HEART RATE: 89 BPM

## 2019-10-30 DIAGNOSIS — G47.33 OBSTRUCTIVE SLEEP APNEA: Primary | ICD-10-CM

## 2019-10-30 DIAGNOSIS — J45.20 MILD INTERMITTENT ASTHMA WITHOUT COMPLICATION: ICD-10-CM

## 2019-10-30 PROCEDURE — 94010 BREATHING CAPACITY TEST: CPT | Performed by: INTERNAL MEDICINE

## 2019-10-30 PROCEDURE — 1036F TOBACCO NON-USER: CPT | Performed by: INTERNAL MEDICINE

## 2019-10-30 PROCEDURE — G8484 FLU IMMUNIZE NO ADMIN: HCPCS | Performed by: INTERNAL MEDICINE

## 2019-10-30 PROCEDURE — 3017F COLORECTAL CA SCREEN DOC REV: CPT | Performed by: INTERNAL MEDICINE

## 2019-10-30 PROCEDURE — 99214 OFFICE O/P EST MOD 30 MIN: CPT | Performed by: INTERNAL MEDICINE

## 2019-10-30 PROCEDURE — G8427 DOCREV CUR MEDS BY ELIG CLIN: HCPCS | Performed by: INTERNAL MEDICINE

## 2019-10-30 PROCEDURE — G8417 CALC BMI ABV UP PARAM F/U: HCPCS | Performed by: INTERNAL MEDICINE

## 2019-10-30 ASSESSMENT — SLEEP AND FATIGUE QUESTIONNAIRES
HOW LIKELY ARE YOU TO NOD OFF OR FALL ASLEEP WHILE LYING DOWN TO REST IN THE AFTERNOON WHEN CIRCUMSTANCES PERMIT: 3
HOW LIKELY ARE YOU TO NOD OFF OR FALL ASLEEP WHEN YOU ARE A PASSENGER IN A CAR FOR AN HOUR WITHOUT A BREAK: 0
HOW LIKELY ARE YOU TO NOD OFF OR FALL ASLEEP WHILE WATCHING TV: 3
HOW LIKELY ARE YOU TO NOD OFF OR FALL ASLEEP WHILE SITTING QUIETLY AFTER LUNCH WITHOUT ALCOHOL: 2
HOW LIKELY ARE YOU TO NOD OFF OR FALL ASLEEP WHILE SITTING AND READING: 2
HOW LIKELY ARE YOU TO NOD OFF OR FALL ASLEEP WHILE SITTING INACTIVE IN A PUBLIC PLACE: 1
HOW LIKELY ARE YOU TO NOD OFF OR FALL ASLEEP WHILE SITTING AND TALKING TO SOMEONE: 3
HOW LIKELY ARE YOU TO NOD OFF OR FALL ASLEEP IN A CAR, WHILE STOPPED FOR A FEW MINUTES IN TRAFFIC: 0
ESS TOTAL SCORE: 14

## 2019-11-18 ENCOUNTER — HOSPITAL ENCOUNTER (OUTPATIENT)
Age: 49
Setting detail: OUTPATIENT SURGERY
Discharge: HOME | End: 2019-11-18
Attending: ANESTHESIOLOGY | Admitting: ANESTHESIOLOGY
Payer: COMMERCIAL

## 2019-11-18 VITALS
OXYGEN SATURATION: 97 % | HEART RATE: 65 BPM | RESPIRATION RATE: 12 BRPM | WEIGHT: 293 LBS | BODY MASS INDEX: 48.82 KG/M2 | HEIGHT: 65 IN | TEMPERATURE: 97.3 F | SYSTOLIC BLOOD PRESSURE: 138 MMHG | DIASTOLIC BLOOD PRESSURE: 78 MMHG

## 2019-11-18 LAB — GLUCOSE BLD-MCNC: 67 MG/DL (ref 65–105)

## 2019-11-18 PROCEDURE — 20611 DRAIN/INJ JOINT/BURSA W/US: CPT | Performed by: ANESTHESIOLOGY

## 2019-11-18 PROCEDURE — 82947 ASSAY GLUCOSE BLOOD QUANT: CPT

## 2019-11-18 PROCEDURE — 7100000010 HC PHASE II RECOVERY - FIRST 15 MIN: Performed by: ANESTHESIOLOGY

## 2019-11-18 PROCEDURE — 2709999900 HC NON-CHARGEABLE SUPPLY: Performed by: ANESTHESIOLOGY

## 2019-11-18 PROCEDURE — 3600000050 HC PAIN LEVEL 1 BASE: Performed by: ANESTHESIOLOGY

## 2019-11-18 PROCEDURE — 7100000011 HC PHASE II RECOVERY - ADDTL 15 MIN: Performed by: ANESTHESIOLOGY

## 2019-11-18 PROCEDURE — 6360000002 HC RX W HCPCS: Performed by: ANESTHESIOLOGY

## 2019-11-18 RX ORDER — SODIUM CHLORIDE 0.9 % (FLUSH) 0.9 %
10 SYRINGE (ML) INJECTION EVERY 12 HOURS SCHEDULED
Status: DISCONTINUED | OUTPATIENT
Start: 2019-11-18 | End: 2019-11-18 | Stop reason: HOSPADM

## 2019-11-18 RX ORDER — SODIUM CHLORIDE 0.9 % (FLUSH) 0.9 %
10 SYRINGE (ML) INJECTION PRN
Status: DISCONTINUED | OUTPATIENT
Start: 2019-11-18 | End: 2019-11-18 | Stop reason: HOSPADM

## 2019-11-18 ASSESSMENT — PAIN - FUNCTIONAL ASSESSMENT: PAIN_FUNCTIONAL_ASSESSMENT: 0-10

## 2019-11-18 ASSESSMENT — PAIN SCALES - GENERAL: PAINLEVEL_OUTOF10: 0

## 2019-12-04 ENCOUNTER — HOSPITAL ENCOUNTER (OUTPATIENT)
Age: 49
Setting detail: SPECIMEN
Discharge: HOME | End: 2019-12-04
Payer: COMMERCIAL

## 2019-12-04 LAB
ABSOLUTE EOS #: 0.2 K/UL (ref 0–0.44)
ABSOLUTE IMMATURE GRANULOCYTE: 0.03 K/UL (ref 0–0.3)
ABSOLUTE LYMPH #: 2.64 K/UL (ref 1.1–3.7)
ABSOLUTE MONO #: 0.63 K/UL (ref 0.1–1.2)
ALBUMIN SERPL-MCNC: 3.8 G/DL (ref 3.5–5.2)
ALBUMIN/GLOBULIN RATIO: 1 (ref 1–2.5)
ALP BLD-CCNC: 90 U/L (ref 35–104)
ALT SERPL-CCNC: 18 U/L (ref 5–33)
ANION GAP SERPL CALCULATED.3IONS-SCNC: 13 MMOL/L (ref 9–17)
AST SERPL-CCNC: 16 U/L
BASOPHILS # BLD: 1 % (ref 0–2)
BASOPHILS ABSOLUTE: 0.07 K/UL (ref 0–0.2)
BILIRUB SERPL-MCNC: 0.19 MG/DL (ref 0.3–1.2)
BUN BLDV-MCNC: 16 MG/DL (ref 6–20)
BUN/CREAT BLD: ABNORMAL (ref 9–20)
CALCIUM SERPL-MCNC: 9.4 MG/DL (ref 8.6–10.4)
CHLORIDE BLD-SCNC: 104 MMOL/L (ref 98–107)
CHOLESTEROL/HDL RATIO: 3.9
CHOLESTEROL: 191 MG/DL
CO2: 26 MMOL/L (ref 20–31)
CREAT SERPL-MCNC: 0.81 MG/DL (ref 0.5–0.9)
DIFFERENTIAL TYPE: ABNORMAL
EOSINOPHILS RELATIVE PERCENT: 3 % (ref 1–4)
GFR AFRICAN AMERICAN: >60 ML/MIN
GFR NON-AFRICAN AMERICAN: >60 ML/MIN
GFR SERPL CREATININE-BSD FRML MDRD: ABNORMAL ML/MIN/{1.73_M2}
GFR SERPL CREATININE-BSD FRML MDRD: ABNORMAL ML/MIN/{1.73_M2}
GLUCOSE BLD-MCNC: 93 MG/DL (ref 70–99)
HCT VFR BLD CALC: 39.7 % (ref 36.3–47.1)
HDLC SERPL-MCNC: 49 MG/DL
HEMOGLOBIN: 12.2 G/DL (ref 11.9–15.1)
IMMATURE GRANULOCYTES: 0 %
LDL CHOLESTEROL: 130 MG/DL (ref 0–130)
LYMPHOCYTES # BLD: 33 % (ref 24–43)
MCH RBC QN AUTO: 27.5 PG (ref 25.2–33.5)
MCHC RBC AUTO-ENTMCNC: 30.7 G/DL (ref 28.4–34.8)
MCV RBC AUTO: 89.6 FL (ref 82.6–102.9)
MONOCYTES # BLD: 8 % (ref 3–12)
NRBC AUTOMATED: 0 PER 100 WBC
PDW BLD-RTO: 15.7 % (ref 11.8–14.4)
PLATELET # BLD: 248 K/UL (ref 138–453)
PLATELET ESTIMATE: ABNORMAL
PMV BLD AUTO: 11.4 FL (ref 8.1–13.5)
POTASSIUM SERPL-SCNC: 4 MMOL/L (ref 3.7–5.3)
RBC # BLD: 4.43 M/UL (ref 3.95–5.11)
RBC # BLD: ABNORMAL 10*6/UL
SEG NEUTROPHILS: 55 % (ref 36–65)
SEGMENTED NEUTROPHILS ABSOLUTE COUNT: 4.37 K/UL (ref 1.5–8.1)
SODIUM BLD-SCNC: 143 MMOL/L (ref 135–144)
TOTAL PROTEIN: 7.6 G/DL (ref 6.4–8.3)
TRIGL SERPL-MCNC: 62 MG/DL
VLDLC SERPL CALC-MCNC: NORMAL MG/DL (ref 1–30)
WBC # BLD: 7.9 K/UL (ref 3.5–11.3)
WBC # BLD: ABNORMAL 10*3/UL

## 2019-12-05 LAB
ESTIMATED AVERAGE GLUCOSE: 123 MG/DL
HBA1C MFR BLD: 5.9 % (ref 4–6)

## 2019-12-13 ENCOUNTER — OFFICE VISIT (OUTPATIENT)
Dept: BARIATRICS/WEIGHT MGMT | Age: 49
End: 2019-12-13
Payer: COMMERCIAL

## 2019-12-13 VITALS
WEIGHT: 293 LBS | HEART RATE: 74 BPM | HEIGHT: 65 IN | BODY MASS INDEX: 48.82 KG/M2 | SYSTOLIC BLOOD PRESSURE: 126 MMHG | DIASTOLIC BLOOD PRESSURE: 80 MMHG

## 2019-12-13 DIAGNOSIS — E28.2 PCOS (POLYCYSTIC OVARIAN SYNDROME): ICD-10-CM

## 2019-12-13 DIAGNOSIS — F32.A DEPRESSION, UNSPECIFIED DEPRESSION TYPE: ICD-10-CM

## 2019-12-13 DIAGNOSIS — N39.41 URGE INCONTINENCE OF URINE: ICD-10-CM

## 2019-12-13 DIAGNOSIS — M25.562 BILATERAL CHRONIC KNEE PAIN: ICD-10-CM

## 2019-12-13 DIAGNOSIS — M25.561 BILATERAL CHRONIC KNEE PAIN: ICD-10-CM

## 2019-12-13 DIAGNOSIS — M17.0 PRIMARY OSTEOARTHRITIS OF BOTH KNEES: Chronic | ICD-10-CM

## 2019-12-13 DIAGNOSIS — F20.9 SCHIZOPHRENIA, UNSPECIFIED TYPE (HCC): ICD-10-CM

## 2019-12-13 DIAGNOSIS — G89.29 BILATERAL CHRONIC KNEE PAIN: ICD-10-CM

## 2019-12-13 DIAGNOSIS — E66.01 MORBID OBESITY WITH BMI OF 50.0-59.9, ADULT (HCC): ICD-10-CM

## 2019-12-13 DIAGNOSIS — M54.9 CHRONIC BACK PAIN, UNSPECIFIED BACK LOCATION, UNSPECIFIED BACK PAIN LATERALITY: ICD-10-CM

## 2019-12-13 DIAGNOSIS — E78.5 HYPERLIPIDEMIA, UNSPECIFIED HYPERLIPIDEMIA TYPE: ICD-10-CM

## 2019-12-13 DIAGNOSIS — E11.69 DIABETES MELLITUS TYPE 2 IN OBESE (HCC): Primary | ICD-10-CM

## 2019-12-13 DIAGNOSIS — M19.90 ARTHRITIS: ICD-10-CM

## 2019-12-13 DIAGNOSIS — G89.29 CHRONIC BACK PAIN, UNSPECIFIED BACK LOCATION, UNSPECIFIED BACK PAIN LATERALITY: ICD-10-CM

## 2019-12-13 DIAGNOSIS — I10 ESSENTIAL HYPERTENSION: ICD-10-CM

## 2019-12-13 DIAGNOSIS — E66.9 DIABETES MELLITUS TYPE 2 IN OBESE (HCC): Primary | ICD-10-CM

## 2019-12-13 PROCEDURE — 3044F HG A1C LEVEL LT 7.0%: CPT | Performed by: NURSE PRACTITIONER

## 2019-12-13 PROCEDURE — 99213 OFFICE O/P EST LOW 20 MIN: CPT | Performed by: NURSE PRACTITIONER

## 2019-12-13 PROCEDURE — G8427 DOCREV CUR MEDS BY ELIG CLIN: HCPCS | Performed by: NURSE PRACTITIONER

## 2019-12-13 PROCEDURE — 3017F COLORECTAL CA SCREEN DOC REV: CPT | Performed by: NURSE PRACTITIONER

## 2019-12-13 PROCEDURE — G8417 CALC BMI ABV UP PARAM F/U: HCPCS | Performed by: NURSE PRACTITIONER

## 2019-12-13 PROCEDURE — 2022F DILAT RTA XM EVC RTNOPTHY: CPT | Performed by: NURSE PRACTITIONER

## 2019-12-13 PROCEDURE — G8484 FLU IMMUNIZE NO ADMIN: HCPCS | Performed by: NURSE PRACTITIONER

## 2019-12-13 PROCEDURE — 1036F TOBACCO NON-USER: CPT | Performed by: NURSE PRACTITIONER

## 2020-01-14 ENCOUNTER — OFFICE VISIT (OUTPATIENT)
Dept: BARIATRICS/WEIGHT MGMT | Age: 50
End: 2020-01-14
Payer: COMMERCIAL

## 2020-01-14 VITALS
WEIGHT: 293 LBS | DIASTOLIC BLOOD PRESSURE: 66 MMHG | HEIGHT: 65 IN | HEART RATE: 78 BPM | SYSTOLIC BLOOD PRESSURE: 118 MMHG | BODY MASS INDEX: 48.82 KG/M2

## 2020-01-14 PROCEDURE — G8484 FLU IMMUNIZE NO ADMIN: HCPCS | Performed by: NURSE PRACTITIONER

## 2020-01-14 PROCEDURE — G8417 CALC BMI ABV UP PARAM F/U: HCPCS | Performed by: NURSE PRACTITIONER

## 2020-01-14 PROCEDURE — 3046F HEMOGLOBIN A1C LEVEL >9.0%: CPT | Performed by: NURSE PRACTITIONER

## 2020-01-14 PROCEDURE — 99213 OFFICE O/P EST LOW 20 MIN: CPT | Performed by: NURSE PRACTITIONER

## 2020-01-14 PROCEDURE — 3017F COLORECTAL CA SCREEN DOC REV: CPT | Performed by: NURSE PRACTITIONER

## 2020-01-14 PROCEDURE — 1036F TOBACCO NON-USER: CPT | Performed by: NURSE PRACTITIONER

## 2020-01-14 PROCEDURE — 2022F DILAT RTA XM EVC RTNOPTHY: CPT | Performed by: NURSE PRACTITIONER

## 2020-01-14 PROCEDURE — G8427 DOCREV CUR MEDS BY ELIG CLIN: HCPCS | Performed by: NURSE PRACTITIONER

## 2020-01-14 NOTE — PROGRESS NOTES
Batsheva Schneider MD at [address] [facility] NERV Bilateral 6/10/2019    INJECTION SYNVISC FLUORO GUIDED FABIOLA KNEES performed by Laith Reid MD at [address] [facility] [address] [facility] NERV Bilateral 11/18/2019    INJECTION SYNVISC BILATERAL KNEE performed by Laith Reid MD at [address] [facility] HYSTEROSCOPY  11/14/2017    exc endometrial polyps    OTHER SURGICAL HISTORY Right 10/17/14    excision dorsal exostosis rt foot    OTHER SURGICAL HISTORY Bilateral 06/10/2019    INJECTION SYNVISC FLUORO GUIDED FABIOLA KNEES (Bilateral )       Family History:  Family History   Problem Relation Age of Onset    Diabetes Mother     Heart Failure Mother     Diabetes Maternal Grandmother     Heart Failure Maternal Grandmother     No Known Problems Maternal Grandfather     No Known Problems Paternal Grandmother     No Known Problems Paternal Grandfather     Cancer Paternal Aunt        Social History:  Social History     Socioeconomic History    Marital status: Single     Spouse name: Not on file    Number of children: 0    Years of education: Not on file    Highest education level: Not on file   Occupational History    Not on file   Social Needs    Financial resource strain: Not on file    Food insecurity:     Worry: Not on file     Inability: Not on file    Transportation needs:     Medical: Not on file     Non-medical: Not on file   Tobacco Use    Smoking status: Never Smoker    Smokeless tobacco: Never Used    Tobacco comment: associates smoking   Substance and Sexual Activity    Alcohol use: No    Drug use: No    Sexual activity: Never   Lifestyle    Physical activity:     Days per week: Not on file     Minutes per session: Not on file    Stress: Not on file   Relationships    Social connections:     Talks on phone: Not on file     Gets together: Not on file     Attends Scientologist service: Not on file     Active member of club or organization: Not on file     Attends meetings of clubs or organizations: Not on file     Relationship status: Not on file    Intimate partner violence:     Fear of current or ex partner: Not on file     Emotionally abused: Not on file     Physically abused: Not on file     Forced sexual activity: Not on file   Other Topics Concern    Not on file   Social History Narrative    Not on file       Current Medications:  Current Outpatient Medications   Medication Sig Dispense Refill    TRAVATAN Z 0.004 % SOLN ophthalmic solution PLACE 1 DROP IN BOTH EYES EACH NIGHT  1    triamcinolone (KENALOG) 0.025 % cream Apply to rash twice on back of neck twice daily 80 g 2    tretinoin (RETIN-A) 0.025 % cream Apply pea sized amount to face, chest and back nightly 45 g 3    diclofenac sodium (VOLTAREN) 1 % GEL Apply 4 g topically 4 times daily 4 Tube 3    bisacodyl (DULCOLAX) 5 MG EC tablet TAKE 4 TABS AT 10 AM THE DAY PRIOR TO COLONOSCOPY 4 tablet 0    Elastic Bandages & Supports (KNEE BRACE ADJUSTABLE HINGED) MISC Use as directed 1 each 0    Elastic Bandages & Supports (KNEE BRACE) MISC 2 Units by Does not apply route daily 2 each 0    gabapentin (NEURONTIN) 300 MG capsule Take 1 capsule by mouth. .      fluticasone (FLOVENT HFA) 110 MCG/ACT inhaler Inhale 1 puff into the lungs 2 times daily 1 Inhaler 11    albuterol sulfate  (90 Base) MCG/ACT inhaler Inhale 2 puffs into the lungs every 6 hours as needed for Wheezing 1 Inhaler 3    ibuprofen (ADVIL;MOTRIN) 800 MG tablet Take 1 tablet by mouth every 6 hours as needed for Pain 30 tablet 0    DICLOFENAC POTASSIUM PO Take 50 mg by mouth 3 times daily as needed      ARIPiprazole (ABILIFY) 15 MG tablet Take 15 mg by mouth nightly       traZODone (DESYREL) 50 MG tablet Take 50 mg by mouth nightly      pravastatin (PRAVACHOL) 80 MG tablet Take 80 mg by mouth nightly       oxybutynin (DITROPAN-XL) 10 MG extended release tablet Take 10 mg by mouth daily      potassium chloride (MICRO-K) 10 MEQ CR capsule Take 10 mEq by mouth daily.       Out   ? Make Social Cues Work for [place]   ? Ways to Stay Motivated   ? Preparing for Long Term Self-Management   ? Take Charge of Your Lifestyle  X ? Mindful Eating, Mindful Movement   ? Manage Your Stress   ? Sit Less for Health   ? More Volume, Fewer Calories   ? Stay Active   ? Balance Your Thoughts   ? Heart Health    ? Looking Back and Looking Forward    Total time:  90 minutes, greater than 50% of visit spent in group counseling/education. Assessment:       Diagnosis Orders   1. Essential hypertension     2. Primary osteoarthritis of both knees     3. Hyperlipidemia, unspecified hyperlipidemia type     4. Chronic back pain, unspecified back location, unspecified back pain laterality     5. PCOS (polycystic ovarian syndrome)     6. Morbid obesity with BMI of 50.0-59.9, adult ([facility]     7. Schizophrenia, unspecified type ([facility]     8. Depression, unspecified depression type     9. Bilateral chronic knee pain     10. Diabetes mellitus type 2 in obese [facility])         Plan:      Track food and weight. Return to clinic as per group medical appointment schedule. Follow-up:  Return in about 1 month (around 2/14/2020). Orders:  No orders of the defined types were placed in this encounter. Prescriptions:  No orders of the defined types were placed in this encounter.       Electronically signed by:  Rosalie Casas CNP

## 2020-01-17 ENCOUNTER — OFFICE VISIT (OUTPATIENT)
Dept: PAIN MANAGEMENT | Age: 50
End: 2020-01-17
Payer: COMMERCIAL

## 2020-01-17 VITALS
BODY MASS INDEX: 48.82 KG/M2 | HEART RATE: 70 BPM | WEIGHT: 293 LBS | SYSTOLIC BLOOD PRESSURE: 125 MMHG | HEIGHT: 65 IN | DIASTOLIC BLOOD PRESSURE: 78 MMHG

## 2020-01-17 PROCEDURE — 99214 OFFICE O/P EST MOD 30 MIN: CPT | Performed by: NURSE PRACTITIONER

## 2020-01-17 ASSESSMENT — ENCOUNTER SYMPTOMS
CONSTIPATION: 0
SHORTNESS OF BREATH: 1
COUGH: 0
CHANGE IN BOWEL HABIT: 0

## 2020-01-17 NOTE — PROGRESS NOTES
Patient is here today to f/u on injection    Chief Complaint   Patient presents with    Follow Up After Procedure     11/18/19 INJECTION SYNVISC BILATERAL KNEE    Knee Pain     PMH  chronic bilateral knee pain  Diagnosed with knee osteoarthritis  Failed conservative measures  Had short-term pain relief with a steroid injection  She is not a surgical candidate because of morbid obesity - she does follow with [facility] for PT and nutrition classes but not interested in bariatric surgery  Had series of Synvisc injection and reported  more than 50% improvement in knee pain  Currently using topical NSAIDs over knee  Able to ambulate better  No side effects  Currently going to [facility] so did not start PT       Procedures  11/18/19   Injection of Bilateral knee with US Guidance. # 1 OF SERIES OF 3  With 40% relief    HPI  Knee Pain    There was no injury mechanism. The pain is present in the left knee and right knee. The quality of the pain is described as shooting and aching. The pain is at a severity of 2/10. The pain is mild. The pain has been constant since onset. The symptoms are aggravated by movement and weight bearing. She has tried NSAIDs, acetaminophen and rest for the symptoms. The treatment provided mild relief. Outcome   Any improvement of activity?   Yes   Any side effects (appetite,leg cramping,facial fleshing): None   Increase of pain:  No  Pain score Today:  1  % of pain relief: 40  Pain diary (medial branch block): No      Pain score Today:  1  Adverse effects (Constipation / Nausea / Sedation / sexual Dysfunction / others) : No  Mood: good  Sleep pattern and quality: good  Activity level: good      Lab Results   Component Value Date    LABA1C 5.9 12/04/2019         Past Medical History, Past Surgical History, Social History, Allergies and Medications, reviewed and updated in EPIC as indicated             Past Medical History:   Diagnosis Date    Asthma     Mild intermittent asthma without Apply 4 g topically 4 times daily, Disp: 4 Tube, Rfl: 3    fluticasone (FLOVENT HFA) 110 MCG/ACT inhaler, Inhale 1 puff into the lungs 2 times daily, Disp: 1 Inhaler, Rfl: 11    Family History   Problem Relation Age of Onset    Diabetes Mother     Heart Failure Mother     Diabetes Maternal Grandmother     Heart Failure Maternal Grandmother     No Known Problems Maternal Grandfather     No Known Problems Paternal Grandmother     No Known Problems Paternal Grandfather     Cancer Paternal Aunt        Social History     Socioeconomic History    Marital status: Single     Spouse name: Not on file    Number of children: 0    Years of education: Not on file    Highest education level: Not on file   Occupational History    Not on file   Social Needs    Financial resource strain: Not on file    Food insecurity:     Worry: Not on file     Inability: Not on file    Transportation needs:     Medical: Not on file     Non-medical: Not on file   Tobacco Use    Smoking status: Never Smoker    Smokeless tobacco: Never Used    Tobacco comment: associates smoking   Substance and Sexual Activity    Alcohol use: No    Drug use: No    Sexual activity: Never   Lifestyle    Physical activity:     Days per week: Not on file     Minutes per session: Not on file    Stress: Not on file   Relationships    Social connections:     Talks on phone: Not on file     Gets together: Not on file     Attends Jain service: Not on file     Active member of club or organization: Not on file     Attends meetings of clubs or organizations: Not on file     Relationship status: Not on file    Intimate partner violence:     Fear of current or ex partner: Not on file     Emotionally abused: Not on file     Physically abused: Not on file     Forced sexual activity: Not on file   Other Topics Concern    Not on file   Social History Narrative    Not on file       Review of Systems:  Review of Systems   Constitution: Positive for night sweats. Negative for chills and fever. Cardiovascular: Negative for chest pain. Respiratory: Positive for shortness of breath. Negative for cough. Musculoskeletal: Positive for arthritis, joint pain, muscle weakness and myalgias. Negative for falls and joint swelling. Knee pain (bilateral)   Gastrointestinal: Negative for change in bowel habit and constipation. Genitourinary: Negative for bladder incontinence. Psychiatric/Behavioral: Positive for depression. Negative for suicidal ideas. Physical Exam:  /78   Pulse 70   Ht 5' 5\" (1.651 m)   Wt (!) 354 lb 3.2 oz (160.7 kg)   LMP  (LMP Unknown) Comment: last period was before March 2018  BMI 58.94 kg/m²     Physical Exam  Constitutional:       Comments: Obese BMI 58.94   Cardiovascular:      Rate and Rhythm: Normal rate. Pulmonary:      Effort: Pulmonary effort is normal.   Musculoskeletal:      Right knee: She exhibits decreased range of motion. Left knee: She exhibits decreased range of motion. Skin:     General: Skin is warm and dry. Neurological:      Mental Status: She is alert and oriented to person, place, and time. Assessment:  Problem List Items Addressed This Visit     Primary osteoarthritis of both knees - Primary (Chronic)    Bilateral chronic knee pain             Treatment Plan:  Patient relates current medications are helping the pain. Patient reports taking pain medications as prescribed, denies obtaining medications from different sources and denies use of illegal drugs. Patient denies side effects from medications like nausea, vomiting, constipation or drowsiness. Patient reports current activities of daily living are possible due to medications and would like to continue them. As always, we encourage daily stretching and strengthening exercises, and recommend minimizing use of pain medications unless patient cannot get through daily activities due to pain.      Pt is unsure why last 2 Synvisc injections were not done - will schedule today

## 2020-01-20 ENCOUNTER — TELEPHONE (OUTPATIENT)
Dept: PAIN MANAGEMENT | Age: 50
End: 2020-01-20

## 2020-02-10 ENCOUNTER — HOSPITAL ENCOUNTER (OUTPATIENT)
Age: 50
Setting detail: OUTPATIENT SURGERY
Discharge: HOME | End: 2020-02-10
Attending: ANESTHESIOLOGY | Admitting: ANESTHESIOLOGY
Payer: COMMERCIAL

## 2020-02-10 VITALS
BODY MASS INDEX: 48.82 KG/M2 | HEIGHT: 65 IN | RESPIRATION RATE: 12 BRPM | OXYGEN SATURATION: 97 % | TEMPERATURE: 97.9 F | HEART RATE: 64 BPM | SYSTOLIC BLOOD PRESSURE: 137 MMHG | DIASTOLIC BLOOD PRESSURE: 73 MMHG | WEIGHT: 293 LBS

## 2020-02-10 PROCEDURE — 3600000050 HC PAIN LEVEL 1 BASE: Performed by: ANESTHESIOLOGY

## 2020-02-10 PROCEDURE — 20611 DRAIN/INJ JOINT/BURSA W/US: CPT | Performed by: ANESTHESIOLOGY

## 2020-02-10 PROCEDURE — 6360000002 HC RX W HCPCS: Performed by: ANESTHESIOLOGY

## 2020-02-10 PROCEDURE — 2709999900 HC NON-CHARGEABLE SUPPLY: Performed by: ANESTHESIOLOGY

## 2020-02-10 PROCEDURE — 7100000010 HC PHASE II RECOVERY - FIRST 15 MIN: Performed by: ANESTHESIOLOGY

## 2020-02-10 RX ORDER — SODIUM CHLORIDE 0.9 % (FLUSH) 0.9 %
10 SYRINGE (ML) INJECTION PRN
Status: DISCONTINUED | OUTPATIENT
Start: 2020-02-10 | End: 2020-02-10 | Stop reason: HOSPADM

## 2020-02-10 RX ORDER — SODIUM CHLORIDE 0.9 % (FLUSH) 0.9 %
10 SYRINGE (ML) INJECTION EVERY 12 HOURS SCHEDULED
Status: DISCONTINUED | OUTPATIENT
Start: 2020-02-10 | End: 2020-02-10 | Stop reason: HOSPADM

## 2020-02-10 ASSESSMENT — PAIN SCALES - GENERAL: PAINLEVEL_OUTOF10: 2

## 2020-02-10 ASSESSMENT — PAIN DESCRIPTION - DESCRIPTORS: DESCRIPTORS: CONSTANT;DULL

## 2020-02-10 ASSESSMENT — PAIN - FUNCTIONAL ASSESSMENT: PAIN_FUNCTIONAL_ASSESSMENT: 0-10

## 2020-02-10 NOTE — H&P
& Supports (KNEE BRACE ADJUSTABLE HINGED) MISC Use as directed 3/27/19   Jesron Conley MD   Elastic Bandages & Supports (KNEE BRACE) MISC 2 Units by Does not apply route daily 3/7/19 3/6/20  Jerson Conley MD   gabapentin (NEURONTIN) 300 MG capsule Take 1 capsule by mouth. .    Historical Provider, MD   fluticasone (FLOVENT HFA) 110 MCG/ACT inhaler Inhale 1 puff into the lungs 2 times daily 4/3/18 6/27/19  Maria Luisa North MD   albuterol sulfate  (90 Base) MCG/ACT inhaler Inhale 2 puffs into the lungs every 6 hours as needed for Wheezing 2/2/18   SINTIA Myers CNP   ibuprofen (ADVIL;MOTRIN) 800 MG tablet Take 1 tablet by mouth every 6 hours as needed for Pain 32/91/97   Luigi Victoriaots,    DICLOFENAC POTASSIUM PO Take 50 mg by mouth 3 times daily as needed    Historical Provider, MD   ARIPiprazole (ABILIFY) 15 MG tablet Take 15 mg by mouth nightly     Historical Provider, MD   traZODone (DESYREL) 50 MG tablet Take 50 mg by mouth nightly    Historical Provider, MD   pravastatin (PRAVACHOL) 80 MG tablet Take 80 mg by mouth nightly     Historical Provider, MD   oxybutynin (DITROPAN-XL) 10 MG extended release tablet Take 10 mg by mouth daily    Historical Provider, MD   potassium chloride (MICRO-K) 10 MEQ CR capsule Take 10 mEq by mouth daily. Historical Provider, MD   valsartan (DIOVAN) 40 MG tablet Take 40 mg by mouth nightly Patient takes at night    Historical Provider, MD   metFORMIN (GLUCOPHAGE) 500 MG tablet Take 500 mg by mouth daily (with breakfast). Historical Provider, MD   hydrochlorothiazide (MICROZIDE) 12.5 MG capsule Take 12.5 mg by mouth nightly     Historical Provider, MD   amLODIPine (NORVASC) 10 MG tablet Take 10 mg by mouth nightly Patient takes at night    Historical Provider, MD         This is a 47 y.o. female who is pleasant, cooperative, alert and oriented x3, in no acute distress.   MORBIDLY OBESE   Heart: Heart sounds are normal.  HR regular rate and rhythm without murmur, bearing. She has tried NSAIDs, acetaminophen and rest for the symptoms. The treatment provided mild relief. Outcome              Any improvement of activity?   Yes              Any side effects (appetite,leg cramping,facial fleshing): None              Increase of pain:  No  Pain score Today:  1  % of pain relief: 40  Pain diary (medial branch block): No        Pain score Today:  1  Adverse effects (Constipation / Nausea / Sedation / sexual Dysfunction / others) : No  Mood: good  Sleep pattern and quality: good  Activity level: good              Lab Results   Component Value Date     LABA1C 5.9 12/04/2019            Past Medical History, Past Surgical History, Social History, Allergies and Medications, reviewed and updated in EPIC as indicated              Past Medical History        Past Medical History:   Diagnosis Date    Asthma       Mild intermittent asthma without complication    Astigmatism, regular 4/12/2017    Bronchitis 2009    Chronic back pain      Depression 5/1/2014    Diabetes mellitus ([facility] [address]       Type II    Frequency of urination      Hyperlipidemia      Hypertension      Increased frequency of urination 8/27/2018    Morbid obesity with body mass index (BMI) of 50.0 to 59.9 in adult ([address]      Myopia 4/12/2017    OAG (open angle glaucoma) suspect, low risk 5/4/2017    Palpitations       occasional    Schizophrenia ([address]      Sleep apnea since Oct. 2017     CPAP nightly    Urge incontinence of urine 8/27/2018    Wears glasses              Past Surgical History         Past Surgical History:   Procedure Laterality Date    DILATION AND CURETTAGE OF UTERUS N/A 11/14/2017     OPERATIVE HYSTEROSCOPY WITH MYOSURE, EXCISION ENDOMETRIAL POLYPS performed by Rebeca Keita MD at [facility] Bilateral 3/25/2019     BILATERAL KNEE FLORO GUIDED STEROID INJECTION performed by Laith Reid MD at [facility] Bilateral 5/13/2019     SYNVISC  MEDICATION INJECTION   FLUORO GUIDED  FABIOLA KNEES performed by Omar Lockett MD at [address] [place] [address] NERV Bilateral 5/20/2019     INJECTION SYNVISC  FLUORO GUIDED FABIOLA KNEES performed by Omar Lockett MD at [address] [place] [address] NERV Bilateral 6/10/2019     INJECTION SYNVISC FLUORO GUIDED FABIOLA KNEES performed by Omar Lockett MD at [address] [place] [address] NERV Bilateral 11/18/2019     INJECTION SYNVISC BILATERAL KNEE performed by Omar Lockett MD at [address] [place] [address] HYSTEROSCOPY   11/14/2017     exc endometrial polyps    OTHER SURGICAL HISTORY Right 10/17/14     excision dorsal exostosis rt foot    OTHER SURGICAL HISTORY Bilateral 06/10/2019     INJECTION SYNVISC FLUORO GUIDED FABIOLA KNEES (Bilateral )                  Allergies   Allergen Reactions    Lisinopril         Makes her cough           Current Medication      Current Outpatient Medications:     TRAVATAN Z 0.004 % SOLN ophthalmic solution, PLACE 1 DROP IN BOTH EYES EACH NIGHT, Disp: , Rfl: 1    triamcinolone (KENALOG) 0.025 % cream, Apply to rash twice on back of neck twice daily, Disp: 80 g, Rfl: 2    tretinoin (RETIN-A) 0.025 % cream, Apply pea sized amount to face, chest and back nightly, Disp: 45 g, Rfl: 3    bisacodyl (DULCOLAX) 5 MG EC tablet, TAKE 4 TABS AT 10 AM THE DAY PRIOR TO COLONOSCOPY, Disp: 4 tablet, Rfl: 0    Elastic Bandages & Supports (KNEE BRACE ADJUSTABLE HINGED) MISC, Use as directed, Disp: 1 each, Rfl: 0    Elastic Bandages & Supports (KNEE BRACE) MISC, 2 Units by Does not apply route daily, Disp: 2 each, Rfl: 0    gabapentin (NEURONTIN) 300 MG capsule, Take 1 capsule by mouth. ., Disp: , Rfl:     albuterol sulfate  (90 Base) MCG/ACT inhaler, Inhale 2 puffs into the lungs every 6 hours as needed for Wheezing, Disp: 1 Inhaler, Rfl: 3    ibuprofen (ADVIL;MOTRIN) 800 MG tablet, Take 1 tablet by mouth every 6 hours as needed for Pain, Disp: 30 tablet, Rfl: 0    DICLOFENAC POTASSIUM PO, Take 50 mg by mouth 3 times daily as needed, Disp: , Rfl:     ARIPiprazole (ABILIFY) 15 MG tablet, Take 15 mg by mouth nightly , Disp: , Rfl:     traZODone (DESYREL) 50 MG tablet, Take 50 mg by mouth nightly, Disp: , Rfl:     pravastatin (PRAVACHOL) 80 MG tablet, Take 80 mg by mouth nightly , Disp: , Rfl:     oxybutynin (DITROPAN-XL) 10 MG extended release tablet, Take 10 mg by mouth daily, Disp: , Rfl:     potassium chloride (MICRO-K) 10 MEQ CR capsule, Take 10 mEq by mouth daily. , Disp: , Rfl:     valsartan (DIOVAN) 40 MG tablet, Take 40 mg by mouth nightly Patient takes at night, Disp: , Rfl:     metFORMIN (GLUCOPHAGE) 500 MG tablet, Take 500 mg by mouth daily (with breakfast). , Disp: , Rfl:     hydrochlorothiazide (MICROZIDE) 12.5 MG capsule, Take 12.5 mg by mouth nightly , Disp: , Rfl:     amLODIPine (NORVASC) 10 MG tablet, Take 10 mg by mouth nightly Patient takes at night, Disp: , Rfl:     diclofenac sodium (VOLTAREN) 1 % GEL, Apply 4 g topically 4 times daily, Disp: 4 Tube, Rfl: 3    fluticasone (FLOVENT HFA) 110 MCG/ACT inhaler, Inhale 1 puff into the lungs 2 times daily, Disp: 1 Inhaler, Rfl: 11        Family History         Family History   Problem Relation Age of Onset    Diabetes Mother      Heart Failure Mother      Diabetes Maternal Grandmother      Heart Failure Maternal Grandmother      No Known Problems Maternal Grandfather      No Known Problems Paternal Grandmother      No Known Problems Paternal Grandfather      Cancer Paternal Aunt              Social History               Socioeconomic History    Marital status: Single       Spouse name: Not on file    Number of children: 0    Years of education: Not on file    Highest education level: Not on file   Occupational History    Not on file   Social Needs    Financial resource strain: Not on file    Food insecurity:       Worry: Not on file       Inability: Not on file   Affresol needs: Medical: Not on file       Non-medical: Not on file   Tobacco Use    Smoking status: Never Smoker    Smokeless tobacco: Never Used    Tobacco comment: associates smoking   Substance and Sexual Activity    Alcohol use: No    Drug use: No    Sexual activity: Never   Lifestyle    Physical activity:       Days per week: Not on file       Minutes per session: Not on file    Stress: Not on file   Relationships    Social connections:       Talks on phone: Not on file       Gets together: Not on file       Attends Christian service: Not on file       Active member of club or organization: Not on file       Attends meetings of clubs or organizations: Not on file       Relationship status: Not on file    Intimate partner violence:       Fear of current or ex partner: Not on file       Emotionally abused: Not on file       Physically abused: Not on file       Forced sexual activity: Not on file   Other Topics Concern    Not on file   Social History Narrative    Not on file            Review of Systems:  Review of Systems   Constitution: Positive for night sweats. Negative for chills and fever. Cardiovascular: Negative for chest pain. Respiratory: Positive for shortness of breath. Negative for cough. Musculoskeletal: Positive for arthritis, joint pain, muscle weakness and myalgias. Negative for falls and joint swelling. Knee pain (bilateral)   Gastrointestinal: Negative for change in bowel habit and constipation. Genitourinary: Negative for bladder incontinence. Psychiatric/Behavioral: Positive for depression. Negative for suicidal ideas. Physical Exam:  /78   Pulse 70   Ht 5' 5\" (1.651 m)   Wt (!) 354 lb 3.2 oz (160.7 kg)   LMP  (LMP Unknown) Comment: last period was before March 2018  BMI 58.94 kg/m²      Physical Exam  Constitutional:       Comments: Obese BMI 58.94   Cardiovascular:      Rate and Rhythm: Normal rate.    Pulmonary:      Effort: Pulmonary effort is

## 2020-02-10 NOTE — OP NOTE
Preoperative Diagnosis: Osteoarthritis of the Bilateral knee. Postoperative Diagnosis: Osteoarthritis of the Bilateral knee. Procedure Performed:  Injection of Bilateral knee with US Guidance. SYNVISC # 2 OF SERIES OF 3      Indication for the Procedure: The patient has Bilateral knee pain not responding to conservative treatment diagnosed with Knee OA  The procedure and the risks were discussed with the patient and an informed consent was obtained. Procedure: The patient is placed in supine/sitting position. Skin over the Bilateral knee was prepped with Betadine and draped in sterile manner. Using [address] with a high frequency linear probe area was scanned to identify the supra patellar recess. Then skin and deep tissues lateral to the patellar tendon just above the tibial plateau were infiltrated with 2 ml of 1% lidocaine. The #22-gauge  spinal needle was introduced through the skin wheal. Then the needle was advanced with US guidance into the supra patellar recess. Finally 2 ml of  SYNVISC was injected into the joint. The needle is removed and a Band-Aid was placed over the needle insertion site. procedure was first performed on the right side and was then repeated on the left side with same technique using ultrasound guidance    Patient tolerated the procedure well and the vital signs remained stable. Patient will be seen in the pain clinic for follow up and further planning.     Electronically signed by Michael Kim MD on 2/10/2020 at 4:08 PM

## 2020-02-11 ENCOUNTER — OFFICE VISIT (OUTPATIENT)
Dept: BARIATRICS/WEIGHT MGMT | Age: 50
End: 2020-02-11
Payer: COMMERCIAL

## 2020-02-11 VITALS
WEIGHT: 293 LBS | HEIGHT: 65 IN | BODY MASS INDEX: 48.82 KG/M2 | HEART RATE: 76 BPM | DIASTOLIC BLOOD PRESSURE: 70 MMHG | SYSTOLIC BLOOD PRESSURE: 116 MMHG

## 2020-02-11 PROCEDURE — 3017F COLORECTAL CA SCREEN DOC REV: CPT | Performed by: NURSE PRACTITIONER

## 2020-02-11 PROCEDURE — G8427 DOCREV CUR MEDS BY ELIG CLIN: HCPCS | Performed by: NURSE PRACTITIONER

## 2020-02-11 PROCEDURE — G8417 CALC BMI ABV UP PARAM F/U: HCPCS | Performed by: NURSE PRACTITIONER

## 2020-02-11 PROCEDURE — 99213 OFFICE O/P EST LOW 20 MIN: CPT | Performed by: NURSE PRACTITIONER

## 2020-02-11 PROCEDURE — 3046F HEMOGLOBIN A1C LEVEL >9.0%: CPT | Performed by: NURSE PRACTITIONER

## 2020-02-11 PROCEDURE — 2022F DILAT RTA XM EVC RTNOPTHY: CPT | Performed by: NURSE PRACTITIONER

## 2020-02-11 PROCEDURE — 1036F TOBACCO NON-USER: CPT | Performed by: NURSE PRACTITIONER

## 2020-02-11 PROCEDURE — G8484 FLU IMMUNIZE NO ADMIN: HCPCS | Performed by: NURSE PRACTITIONER

## 2020-02-11 NOTE — PROGRESS NOTES
Eneida Luo MD at [address] [facility] NERV Bilateral 6/10/2019    INJECTION SYNVISC FLUORO GUIDED FABIOLA KNEES performed by Brenda Becerril MD at [address] [facility] [address] NERV Bilateral 11/18/2019    INJECTION SYNVISC BILATERAL KNEE performed by Brenda Becerril MD at [facility] HYSTEROSCOPY  11/14/2017    exc endometrial polyps    OTHER SURGICAL HISTORY Right 10/17/14    excision dorsal exostosis rt foot    OTHER SURGICAL HISTORY Bilateral 06/10/2019    INJECTION SYNVISC FLUORO GUIDED FABIOLA KNEES (Bilateral )       Family History:  Family History   Problem Relation Age of Onset    Diabetes Mother     Heart Failure Mother     Diabetes Maternal Grandmother     Heart Failure Maternal Grandmother     No Known Problems Maternal Grandfather     No Known Problems Paternal Grandmother     No Known Problems Paternal Grandfather     Cancer Paternal Aunt        Social History:  Social History     Socioeconomic History    Marital status: Single     Spouse name: Not on file    Number of children: 0    Years of education: Not on file    Highest education level: Not on file   Occupational History    Not on file   Social Needs    Financial resource strain: Not on file    Food insecurity:     Worry: Not on file     Inability: Not on file    Transportation needs:     Medical: Not on file     Non-medical: Not on file   Tobacco Use    Smoking status: Never Smoker    Smokeless tobacco: Never Used    Tobacco comment: associates smoking   Substance and Sexual Activity    Alcohol use: No    Drug use: No    Sexual activity: Never   Lifestyle    Physical activity:     Days per week: Not on file     Minutes per session: Not on file    Stress: Not on file   Relationships    Social connections:     Talks on phone: Not on file     Gets together: Not on file     Attends Pentecostalism service: Not on file     Active member of club or organization: Not on file     Attends meetings of clubs or organizations: valsartan (DIOVAN) 40 MG tablet Take 40 mg by mouth nightly Patient takes at night      metFORMIN (GLUCOPHAGE) 500 MG tablet Take 500 mg by mouth daily (with breakfast).  hydrochlorothiazide (MICROZIDE) 12.5 MG capsule Take 12.5 mg by mouth nightly       amLODIPine (NORVASC) 10 MG tablet Take 10 mg by mouth nightly Patient takes at night       No current facility-administered medications for this visit. Vital Signs:  /70 (Site: Right Upper Arm, Position: Sitting, Cuff Size: Large Adult)   Pulse 76   Ht 5' 5\" (1.651 m)   Wt (!) 355 lb (161 kg)   LMP  (LMP Unknown) Comment: last period was before March 2018  BMI 59.08 kg/m²     BMI/Height/Weight:  Body mass index is 59.08 kg/m². Review of Systems  Constitutional: Weight gain      Objective:      Physical Exam   Vital signs reviewed. General Appearance: Well-developed and well-nourished. No acute distress. Skin: Warm, dry. Head: Normocephalic. Pulmonary/Chest: Normal respiratory effort. Musculoskeletal: Movement x4. Neurological:  Alert and oriented. Individual goal for this encounter:  I want to lose weight, eat right, and goal to get down to 130 lbs and drop sizes. X ? Vital signs reviewed and discussed with patient. ? Labs/EKG reviewed and discussed with patient. ? Blood sugar log reviewed and discussed with patient. ? Physical activity discussed. ? Medication changes recommended. ? Smoking cessation discussed. X ? Specific questions/concerns addressed. Specific group medical question(s) addressed in this encounter:  Discussion about cholesterol. Group discussion topic for this encounter:     ? Archermilo Renteria   ? Be a Fat & Calorie    ? Healthy Eating   ? Move Those Muscles   ? Tip the Calorie Balance   ? Take charge of What's Around You   ? Problem Solving   ? Step Up Your Physical Activity Plan   ? Manage Slips and Self-Defeating Thoughts   ? [address]   ?

## 2020-02-17 ENCOUNTER — APPOINTMENT (OUTPATIENT)
Dept: GENERAL RADIOLOGY | Age: 50
End: 2020-02-17
Attending: ANESTHESIOLOGY
Payer: COMMERCIAL

## 2020-02-17 ENCOUNTER — HOSPITAL ENCOUNTER (OUTPATIENT)
Age: 50
Setting detail: OUTPATIENT SURGERY
Discharge: HOME | End: 2020-02-17
Attending: ANESTHESIOLOGY | Admitting: ANESTHESIOLOGY
Payer: COMMERCIAL

## 2020-02-17 VITALS
HEART RATE: 68 BPM | BODY MASS INDEX: 48.82 KG/M2 | SYSTOLIC BLOOD PRESSURE: 116 MMHG | TEMPERATURE: 96.8 F | WEIGHT: 293 LBS | OXYGEN SATURATION: 100 % | RESPIRATION RATE: 15 BRPM | HEIGHT: 65 IN | DIASTOLIC BLOOD PRESSURE: 74 MMHG

## 2020-02-17 LAB — GLUCOSE BLD-MCNC: 81 MG/DL (ref 65–105)

## 2020-02-17 PROCEDURE — 6360000002 HC RX W HCPCS: Performed by: ANESTHESIOLOGY

## 2020-02-17 PROCEDURE — 7100000011 HC PHASE II RECOVERY - ADDTL 15 MIN: Performed by: ANESTHESIOLOGY

## 2020-02-17 PROCEDURE — 2709999900 HC NON-CHARGEABLE SUPPLY: Performed by: ANESTHESIOLOGY

## 2020-02-17 PROCEDURE — 20611 DRAIN/INJ JOINT/BURSA W/US: CPT | Performed by: ANESTHESIOLOGY

## 2020-02-17 PROCEDURE — 82947 ASSAY GLUCOSE BLOOD QUANT: CPT

## 2020-02-17 PROCEDURE — 7100000010 HC PHASE II RECOVERY - FIRST 15 MIN: Performed by: ANESTHESIOLOGY

## 2020-02-17 PROCEDURE — 3600000050 HC PAIN LEVEL 1 BASE: Performed by: ANESTHESIOLOGY

## 2020-02-17 RX ORDER — SODIUM CHLORIDE 0.9 % (FLUSH) 0.9 %
10 SYRINGE (ML) INJECTION EVERY 12 HOURS SCHEDULED
Status: DISCONTINUED | OUTPATIENT
Start: 2020-02-17 | End: 2020-02-17 | Stop reason: HOSPADM

## 2020-02-17 RX ORDER — SODIUM CHLORIDE 0.9 % (FLUSH) 0.9 %
10 SYRINGE (ML) INJECTION PRN
Status: DISCONTINUED | OUTPATIENT
Start: 2020-02-17 | End: 2020-02-17 | Stop reason: HOSPADM

## 2020-02-17 ASSESSMENT — PAIN SCALES - GENERAL
PAINLEVEL_OUTOF10: 2
PAINLEVEL_OUTOF10: 4
PAINLEVEL_OUTOF10: 2
PAINLEVEL_OUTOF10: 4

## 2020-02-17 ASSESSMENT — PAIN - FUNCTIONAL ASSESSMENT: PAIN_FUNCTIONAL_ASSESSMENT: 0-10

## 2020-02-17 ASSESSMENT — PAIN DESCRIPTION - DESCRIPTORS: DESCRIPTORS: ACHING

## 2020-02-17 NOTE — H&P
History and Physical Update    Pt Name: Laurance Osler  MRN: [id number]  YOB: 1970  Date of evaluation: 2/17/2020      [x] I have reviewed the WEIGHT MANAGEMENT OFFICE VISIT  OF 2/11/2020  which meets the criteria for an Interval History and Physical note COPIED BELOW. SHE HAS CHRONIC KNEE PAIN ASSOCIATED WITH MORBID OBESITY. [x] I have examined  Alma Delia Goldsmith  There are no changes to the patient who is scheduled for a BILATERAL SYNVISC INJECTIONS  TO THE KNEES  The patient denies health changes, fever, chills, productive cough, SOB, chest pain, open sores or wounds. SHE DOES NOT TAKE BLOOD THINNERS, SHE HAS DIABETES BLOOD SUGAR TODAY IS 81 . Vital signs: BP (!) 159/76   Pulse 68   Temp 98.2 °F (36.8 °C) (Oral)   Resp 20   Ht 5' 5\" (1.651 m)   Wt (!) 357 lb (161.9 kg)   LMP  (LMP Unknown) Comment: last period was before March 2018  SpO2 98%   BMI 59.41 kg/m²     Allergies:  Lisinopril    Medications:    Prior to Admission medications    Medication Sig Start Date End Date Taking? Authorizing Provider   TRAVATAN Z 0.004 % SOLN ophthalmic solution PLACE 1 DROP IN BOTH EYES EACH NIGHT 10/4/19  Yes Historical Provider, MD   gabapentin (NEURONTIN) 300 MG capsule Take 1 capsule by mouth. Litzy Farmer Historical Provider, MD   ibuprofen (ADVIL;MOTRIN) 800 MG tablet Take 1 tablet by mouth every 6 hours as needed for Pain 11/14/17  Yes Monika Alonso,    DICLOFENAC POTASSIUM PO Take 50 mg by mouth 3 times daily as needed   Yes Historical Provider, MD   ARIPiprazole (ABILIFY) 15 MG tablet Take 15 mg by mouth nightly    Yes Historical Provider, MD   traZODone (DESYREL) 50 MG tablet Take 50 mg by mouth nightly   Yes Historical Provider, MD   pravastatin (PRAVACHOL) 80 MG tablet Take 80 mg by mouth nightly    Yes Historical Provider, MD   oxybutynin (DITROPAN-XL) 10 MG extended release tablet Take 10 mg by mouth daily   Yes Historical Provider, MD   potassium chloride (MICRO-K) 10 MEQ CR capsule Take 10 mEq by results for input(s): HGB, HCT, WBC, MCV, PLT, NA, K, CL, CO2, BUN, CREATININE, GLUCOSE, INR, PROTIME, APTT, AST, ALT, LABALBU, HCG in the last 720 hours. [address] , ACNP-BC  Electronically signed 2/17/2020 at 1:04 PM             Date:  2/11/2020          Related encounter: Office Visit from 2/11/2020 in [facility]         Signed        Expand All Collapse All     Show:Clear all  [x]Manual[x]Template[x]Copied    Added by:  [x]Lashonda Lovell, APRN - CNP    []Sera for details       Group Lifestyle Balance Follow-up Progress Note        Subjective      Patient is here for group medical appointment for Group Lifestyle Balance weight management program follow-up for the chronic conditions of DM Type 2, HTN, HLD, Chronic Back Pain, Schizophrenia and Depression (has [facility]), Arthritis, Chronic Bilateral Knee Pain. Patient continues on the program and tolerating well. Total weight gain of 3 lbs to date. No current issues. Allergies: Allergies   Allergen Reactions    Lisinopril         Makes her cough         Past Medical History:     Past Medical History        Past Medical History:   Diagnosis Date    Asthma       Mild intermittent asthma without complication    Astigmatism, regular 4/12/2017    Bronchitis 2009    Chronic back pain      Depression 5/1/2014    Diabetes mellitus ([address]       Type II    Frequency of urination      Hyperlipidemia      Hypertension      Increased frequency of urination 8/27/2018    Morbid obesity with body mass index (BMI) of 50.0 to 59.9 in adult ([address]      Myopia 4/12/2017    OAG (open angle glaucoma) suspect, low risk 5/4/2017    Palpitations       occasional    Schizophrenia ([address]      Sleep apnea since Oct. 2017     CPAP nightly    Urge incontinence of urine 8/27/2018    Wears glasses        .      Past Surgical History:  Past Surgical History         Past Surgical History:   Procedure Not on file    Transportation needs:       Medical: Not on file       Non-medical: Not on file   Tobacco Use    Smoking status: Never Smoker    Smokeless tobacco: Never Used    Tobacco comment: associates smoking   Substance and Sexual Activity    Alcohol use: No    Drug use: No    Sexual activity: Never   Lifestyle    Physical activity:       Days per week: Not on file       Minutes per session: Not on file    Stress: Not on file   Relationships    Social connections:       Talks on phone: Not on file       Gets together: Not on file       Attends Church service: Not on file       Active member of club or organization: Not on file       Attends meetings of clubs or organizations: Not on file       Relationship status: Not on file    Intimate partner violence:       Fear of current or ex partner: Not on file       Emotionally abused: Not on file       Physically abused: Not on file       Forced sexual activity: Not on file   Other Topics Concern    Not on file   Social History Narrative    Not on file            Current Medications:  Current Facility-Administered Medications          Current Outpatient Medications   Medication Sig Dispense Refill    diclofenac sodium (VOLTAREN) 1 % GEL Apply 4 g topically 4 times daily 4 Tube 3    TRAVATAN Z 0.004 % SOLN ophthalmic solution PLACE 1 DROP IN BOTH EYES EACH NIGHT   1    triamcinolone (KENALOG) 0.025 % cream Apply to rash twice on back of neck twice daily 80 g 2    tretinoin (RETIN-A) 0.025 % cream Apply pea sized amount to face, chest and back nightly 45 g 3    bisacodyl (DULCOLAX) 5 MG EC tablet TAKE 4 TABS AT 10 AM THE DAY PRIOR TO COLONOSCOPY 4 tablet 0    Elastic Bandages & Supports (KNEE BRACE ADJUSTABLE HINGED) MISC Use as directed 1 each 0    Elastic Bandages & Supports (KNEE BRACE) MISC 2 Units by Does not apply route daily 2 each 0    gabapentin (NEURONTIN) 300 MG capsule Take 1 capsule by mouth. .        fluticasone (FLOVENT HFA) 110 MCG/ACT inhaler Inhale 1 puff into the lungs 2 times daily 1 Inhaler 11    albuterol sulfate  (90 Base) MCG/ACT inhaler Inhale 2 puffs into the lungs every 6 hours as needed for Wheezing 1 Inhaler 3    ibuprofen (ADVIL;MOTRIN) 800 MG tablet Take 1 tablet by mouth every 6 hours as needed for Pain 30 tablet 0    DICLOFENAC POTASSIUM PO Take 50 mg by mouth 3 times daily as needed        ARIPiprazole (ABILIFY) 15 MG tablet Take 15 mg by mouth nightly         traZODone (DESYREL) 50 MG tablet Take 50 mg by mouth nightly        pravastatin (PRAVACHOL) 80 MG tablet Take 80 mg by mouth nightly         oxybutynin (DITROPAN-XL) 10 MG extended release tablet Take 10 mg by mouth daily        potassium chloride (MICRO-K) 10 MEQ CR capsule Take 10 mEq by mouth daily.  valsartan (DIOVAN) 40 MG tablet Take 40 mg by mouth nightly Patient takes at night        metFORMIN (GLUCOPHAGE) 500 MG tablet Take 500 mg by mouth daily (with breakfast).  hydrochlorothiazide (MICROZIDE) 12.5 MG capsule Take 12.5 mg by mouth nightly         amLODIPine (NORVASC) 10 MG tablet Take 10 mg by mouth nightly Patient takes at night          No current facility-administered medications for this visit. Vital Signs:  /70 (Site: Right Upper Arm, Position: Sitting, Cuff Size: Large Adult)   Pulse 76   Ht 5' 5\" (1.651 m)   Wt (!) 355 lb (161 kg)   LMP  (LMP Unknown) Comment: last period was before March 2018  BMI 59.08 kg/m²      BMI/Height/Weight:  Body mass index is 59.08 kg/m². Review of Systems  Constitutional: Weight gain        Objective:       Physical Exam   Vital signs reviewed. General Appearance: Well-developed and well-nourished. No acute distress. Skin: Warm, dry. Head: Normocephalic. Pulmonary/Chest: Normal respiratory effort. Musculoskeletal: Movement x4. Neurological:  Alert and oriented.             Individual goal for this encounter:  I want to lose weight, eat right, and goal

## 2020-02-17 NOTE — OP NOTE
Preoperative Diagnosis: Osteoarthritis of the Bilateral knee. Postoperative Diagnosis: Osteoarthritis of the Bilateral knee. Procedure Performed:  Injection of Bilateral knee with US Guidance. SYNVISC # 3    BLOOD LOSS; NONE      Indication for the Procedure: The patient has Bilateral knee pain not responding to conservative treatment diagnosed with Knee OA  The procedure and the risks were discussed with the patient and an informed consent was obtained. Procedure: The patient is placed in supine/sitting position. Skin over the Bilateral knee was prepped with Betadine and draped in sterile manner. Using [address] with a high frequency linear probe area was scanned to identify the supra patellar recess. Then skin and deep tissues lateral to the patellar tendon just above the tibial plateau were infiltrated with 2  ml of 1% lidocaine. The #22-gauge  spinal needle was introduced through the skin wheal. Then the needle was advanced with US guidance into the supra patellar recess. Finally 2ml of treatment solution containing SYNVISC  were injected into the joint. The needle is removed and a Band-Aid was placed over the needle insertion site. Patient tolerated the procedure well and the vital signs remained stable. Patient will be seen in the pain clinic for follow up and further planning.     Electronically signed by Brenda Becerril MD on 2/17/2020 at 2:16 PM

## 2020-02-18 ENCOUNTER — HOSPITAL ENCOUNTER (EMERGENCY)
Age: 50
Discharge: HOME | End: 2020-02-18
Attending: EMERGENCY MEDICINE
Payer: COMMERCIAL

## 2020-02-18 VITALS
DIASTOLIC BLOOD PRESSURE: 59 MMHG | SYSTOLIC BLOOD PRESSURE: 138 MMHG | HEIGHT: 65 IN | BODY MASS INDEX: 48.82 KG/M2 | WEIGHT: 293 LBS | TEMPERATURE: 98.1 F | HEART RATE: 76 BPM | RESPIRATION RATE: 20 BRPM | OXYGEN SATURATION: 96 %

## 2020-02-18 PROCEDURE — 99282 EMERGENCY DEPT VISIT SF MDM: CPT

## 2020-02-18 RX ORDER — SULFAMETHOXAZOLE AND TRIMETHOPRIM 800; 160 MG/1; MG/1
1 TABLET ORAL 2 TIMES DAILY
Qty: 20 TABLET | Refills: 0 | Status: SHIPPED | OUTPATIENT
Start: 2020-02-18 | End: 2020-02-28

## 2020-02-18 RX ORDER — CEPHALEXIN 500 MG/1
500 CAPSULE ORAL 4 TIMES DAILY
Qty: 40 CAPSULE | Refills: 0 | Status: SHIPPED | OUTPATIENT
Start: 2020-02-18 | End: 2020-02-28

## 2020-02-18 RX ORDER — ACETAMINOPHEN AND CODEINE PHOSPHATE 300; 30 MG/1; MG/1
1-2 TABLET ORAL 3 TIMES DAILY PRN
Qty: 12 TABLET | Refills: 0 | Status: SHIPPED | OUTPATIENT
Start: 2020-02-18 | End: 2020-02-21

## 2020-02-18 ASSESSMENT — PAIN SCALES - GENERAL: PAINLEVEL_OUTOF10: 4

## 2020-02-18 NOTE — ED NOTES
Patient education flyer \"[facility] Taking Antibiotics: What you need to know\" was provided and reviewed. Questions answered and understanding was verbalized by the patient and/or family.         Veronica Domingo RN  02/18/20 [id number]

## 2020-02-18 NOTE — ED PROVIDER NOTES
IBUPROFEN (ADVIL;MOTRIN) 800 MG TABLET    Take 1 tablet by mouth every 6 hours as needed for Pain    METFORMIN (GLUCOPHAGE) 500 MG TABLET    Take 500 mg by mouth daily (with breakfast). OXYBUTYNIN (DITROPAN-XL) 10 MG EXTENDED RELEASE TABLET    Take 10 mg by mouth daily    POTASSIUM CHLORIDE (MICRO-K) 10 MEQ CR CAPSULE    Take 10 mEq by mouth daily.     PRAVASTATIN (PRAVACHOL) 80 MG TABLET    Take 80 mg by mouth nightly     TRAVATAN Z 0.004 % SOLN OPHTHALMIC SOLUTION    PLACE 1 DROP IN BOTH EYES EACH NIGHT    TRAZODONE (DESYREL) 50 MG TABLET    Take 50 mg by mouth nightly    TRETINOIN (RETIN-A) 0.025 % CREAM    Apply pea sized amount to face, chest and back nightly    TRIAMCINOLONE (KENALOG) 0.025 % CREAM    Apply to rash twice on back of neck twice daily    VALSARTAN (DIOVAN) 40 MG TABLET    Take 40 mg by mouth nightly Patient takes at night       PAST MEDICAL HISTORY         Diagnosis Date    Asthma     Mild intermittent asthma without complication    Astigmatism, regular 4/12/2017    Bronchitis 2009    Chronic back pain     on 2/18/20 pt is presently on pain mgmnt    Depression 5/1/2014    Diabetes mellitus ([address]     Type II    Frequency of urination     Hyperlipidemia     Hypertension     Increased frequency of urination 8/27/2018    Morbid obesity with body mass index (BMI) of 50.0 to 59.9 in adult ([address]     Myopia 4/12/2017    OAG (open angle glaucoma) suspect, low risk 5/4/2017    Palpitations     occasional    Schizophrenia ([address]     Sleep apnea since Oct. 2017    CPAP nightly    Urge incontinence of urine 8/27/2018    Wears glasses        SURGICAL HISTORY           Procedure Laterality Date    DILATION AND CURETTAGE OF UTERUS N/A 11/14/2017    OPERATIVE HYSTEROSCOPY WITH MYOSURE, EXCISION ENDOMETRIAL POLYPS performed by Maty Deleon MD at [address] Bilateral 3/25/2019    BILATERAL KNEE FLORO GUIDED STEROID INJECTION performed by Óscar Rudd (!) 357 lb (161.9 kg)      Physical Exam  Constitutional:       Appearance: She is well-developed. HENT:      Head: Normocephalic and atraumatic. Neck:      Musculoskeletal: Normal range of motion and neck supple. Cardiovascular:      Rate and Rhythm: Normal rate and regular rhythm. Pulmonary:      Effort: Pulmonary effort is normal.      Breath sounds: Normal breath sounds. Abdominal:      Palpations: Abdomen is soft. Musculoskeletal: Normal range of motion. Skin:     General: Skin is warm. Findings: No rash. Neurological:      Mental Status: She is alert and oriented to person, place, and time. Psychiatric:         Behavior: Behavior normal.                 DIAGNOSTIC RESULTS     EKG: All EKG's are interpreted by the Emergency Department Physician who either signs or Co-signs this chart in the absence of a cardiologist.        RADIOLOGY:   Non-plain film images such as CT, Ultrasound and MRI are read by the radiologist. Plain radiographic images arevisualized and preliminarily interpreted by the emergency physician with the below findings:        Interpretation per the Radiologist below, if available at thetime of this note:          ED BEDSIDE ULTRASOUND:   Performed by ED Physician - none    LABS:  Labs Reviewed - No data to display    All other labs were within normal range or not returned as of this dictation. EMERGENCY DEPARTMENT COURSE and DIFFERENTIAL DIAGNOSIS/MDM:   Vitals:    Vitals:    02/18/20 1826   BP: (!) 138/59   Pulse: 76   Resp: 20   Temp: 98.1 °F (36.7 °C)   TempSrc: Oral   SpO2: 96%   Weight: (!) 357 lb (161.9 kg)   Height: 5' 5\" (1.651 m)     Area is not ready for incision and drainage. Patient be treated with antibiotics and pain medication discharged home outpatient follow-up. CONSULTS:  None    PROCEDURES:  Procedures        FINAL IMPRESSION      1.  Dermoid cyst of neck          DISPOSITION/PLAN   DISPOSITION Decision To Discharge 02/18/2020 06:50:46

## 2020-03-03 ENCOUNTER — OFFICE VISIT (OUTPATIENT)
Dept: BARIATRICS/WEIGHT MGMT | Age: 50
End: 2020-03-03
Payer: COMMERCIAL

## 2020-03-03 VITALS
DIASTOLIC BLOOD PRESSURE: 80 MMHG | SYSTOLIC BLOOD PRESSURE: 122 MMHG | WEIGHT: 293 LBS | HEART RATE: 76 BPM | HEIGHT: 65 IN | BODY MASS INDEX: 48.82 KG/M2

## 2020-03-03 PROCEDURE — G8484 FLU IMMUNIZE NO ADMIN: HCPCS | Performed by: NURSE PRACTITIONER

## 2020-03-03 PROCEDURE — 1036F TOBACCO NON-USER: CPT | Performed by: NURSE PRACTITIONER

## 2020-03-03 PROCEDURE — 3017F COLORECTAL CA SCREEN DOC REV: CPT | Performed by: NURSE PRACTITIONER

## 2020-03-03 PROCEDURE — G8427 DOCREV CUR MEDS BY ELIG CLIN: HCPCS | Performed by: NURSE PRACTITIONER

## 2020-03-03 PROCEDURE — 2022F DILAT RTA XM EVC RTNOPTHY: CPT | Performed by: NURSE PRACTITIONER

## 2020-03-03 PROCEDURE — 99213 OFFICE O/P EST LOW 20 MIN: CPT | Performed by: NURSE PRACTITIONER

## 2020-03-03 PROCEDURE — 3046F HEMOGLOBIN A1C LEVEL >9.0%: CPT | Performed by: NURSE PRACTITIONER

## 2020-03-03 PROCEDURE — G8417 CALC BMI ABV UP PARAM F/U: HCPCS | Performed by: NURSE PRACTITIONER

## 2020-03-03 NOTE — PROGRESS NOTES
FLUORO GUIDED FABIOLA KNEES performed by Tyra Cantu MD at [address] [facility] NERV Bilateral 6/10/2019    INJECTION SYNVISC FLUORO GUIDED FABIOLA KNEES performed by Tyra Cantu MD at [address] [place] [address] NERV Bilateral 11/18/2019    INJECTION SYNVISC BILATERAL KNEE performed by Tyra Cantu MD at [address] HYSTEROSCOPY  11/14/2017    exc endometrial polyps    OTHER SURGICAL HISTORY Right 10/17/14    excision dorsal exostosis rt foot    OTHER SURGICAL HISTORY Bilateral 06/10/2019    INJECTION SYNVISC FLUORO GUIDED FABIOLA KNEES (Bilateral )       Family History:  Family History   Problem Relation Age of Onset    Diabetes Mother     Heart Failure Mother     Diabetes Maternal Grandmother     Heart Failure Maternal Grandmother     No Known Problems Maternal Grandfather     No Known Problems Paternal Grandmother     No Known Problems Paternal Grandfather     Cancer Paternal Aunt        Social History:  Social History     Socioeconomic History    Marital status: Single     Spouse name: Not on file    Number of children: 0    Years of education: Not on file    Highest education level: Not on file   Occupational History    Not on file   Social Needs    Financial resource strain: Not on file    Food insecurity:     Worry: Not on file     Inability: Not on file    Transportation needs:     Medical: Not on file     Non-medical: Not on file   Tobacco Use    Smoking status: Never Smoker    Smokeless tobacco: Never Used    Tobacco comment: associates smoking   Substance and Sexual Activity    Alcohol use: No    Drug use: No    Sexual activity: Never   Lifestyle    Physical activity:     Days per week: Not on file     Minutes per session: Not on file    Stress: Not on file   Relationships    Social connections:     Talks on phone: Not on file     Gets together: Not on file     Attends Congregational service: Not on file     Active member of club or organization: Not on file Attends meetings of clubs or organizations: Not on file     Relationship status: Not on file    Intimate partner violence:     Fear of current or ex partner: Not on file     Emotionally abused: Not on file     Physically abused: Not on file     Forced sexual activity: Not on file   Other Topics Concern    Not on file   Social History Narrative    Not on file       Current Medications:  Current Outpatient Medications   Medication Sig Dispense Refill    diclofenac sodium (VOLTAREN) 1 % GEL Apply 4 g topically 4 times daily 4 Tube 3    TRAVATAN Z 0.004 % SOLN ophthalmic solution PLACE 1 DROP IN BOTH EYES EACH NIGHT  1    triamcinolone (KENALOG) 0.025 % cream Apply to rash twice on back of neck twice daily 80 g 2    tretinoin (RETIN-A) 0.025 % cream Apply pea sized amount to face, chest and back nightly 45 g 3    bisacodyl (DULCOLAX) 5 MG EC tablet TAKE 4 TABS AT 10 AM THE DAY PRIOR TO COLONOSCOPY 4 tablet 0    Elastic Bandages & Supports (KNEE BRACE ADJUSTABLE HINGED) MISC Use as directed 1 each 0    Elastic Bandages & Supports (KNEE BRACE) MISC 2 Units by Does not apply route daily 2 each 0    gabapentin (NEURONTIN) 300 MG capsule Take 1 capsule by mouth. .      fluticasone (FLOVENT HFA) 110 MCG/ACT inhaler Inhale 1 puff into the lungs 2 times daily 1 Inhaler 11    albuterol sulfate  (90 Base) MCG/ACT inhaler Inhale 2 puffs into the lungs every 6 hours as needed for Wheezing 1 Inhaler 3    ibuprofen (ADVIL;MOTRIN) 800 MG tablet Take 1 tablet by mouth every 6 hours as needed for Pain 30 tablet 0    DICLOFENAC POTASSIUM PO Take 50 mg by mouth 3 times daily as needed      ARIPiprazole (ABILIFY) 15 MG tablet Take 15 mg by mouth nightly       traZODone (DESYREL) 50 MG tablet Take 50 mg by mouth nightly      pravastatin (PRAVACHOL) 80 MG tablet Take 80 mg by mouth nightly       oxybutynin (DITROPAN-XL) 10 MG extended release tablet Take 10 mg by mouth daily      potassium chloride (MICRO-K) 10 MEQ Thoughts   ? [address]   ? Make Social Cues Work for [place]   ? Ways to Stay Motivated   ? Preparing for Long Term Self-Management   ? Take Charge of Your Lifestyle   ? Mindful Eating, Mindful Movement   ? Manage Your Stress   ? Sit Less for Health   ? More Volume, Fewer Calories   ? Stay Active   X? Balance Your Thoughts   ? Heart Health    ? Looking Back and Looking Forward    Total time:  90 minutes, greater than 50% of visit spent in group counseling/education. Assessment:       Diagnosis Orders   1. PCOS (polycystic ovarian syndrome)     2. Morbid obesity with BMI of 50.0-59.9, adult ([facility]     3. Chronic back pain, unspecified back location, unspecified back pain laterality     4. Hyperlipidemia, unspecified hyperlipidemia type     5. Essential hypertension     6. Primary osteoarthritis of both knees     7. Schizophrenia, unspecified type ([facility]     8. Depression, unspecified depression type     9. Bilateral chronic knee pain     10. Diabetes mellitus type 2 in [facility])         Plan:      Track food and weight. Return to clinic as per group medical appointment schedule. Follow-up:  Return in about 1 month (around 4/3/2020). Orders:  No orders of the defined types were placed in this encounter. Prescriptions:  No orders of the defined types were placed in this encounter.       Electronically signed by:  Ladi Vazquez CNP

## 2020-03-13 ENCOUNTER — HOSPITAL ENCOUNTER (OUTPATIENT)
Age: 50
Discharge: HOME | End: 2020-03-13
Payer: COMMERCIAL

## 2020-03-13 LAB
AMPHETAMINE SCREEN URINE: NEGATIVE
BARBITURATE SCREEN URINE: NEGATIVE
BENZODIAZEPINE SCREEN, URINE: NEGATIVE
BUPRENORPHINE URINE: NORMAL
CANNABINOID SCREEN URINE: NEGATIVE
COCAINE METABOLITE, URINE: NEGATIVE
MDMA URINE: NORMAL
METHADONE SCREEN, URINE: NEGATIVE
METHAMPHETAMINE, URINE: NORMAL
OPIATES, URINE: NEGATIVE
OXYCODONE SCREEN URINE: NEGATIVE
PHENCYCLIDINE, URINE: NEGATIVE
PROPOXYPHENE, URINE: NORMAL
TEST INFORMATION: NORMAL
TRICYCLIC ANTIDEPRESSANTS, UR: NORMAL

## 2020-03-13 PROCEDURE — 80307 DRUG TEST PRSMV CHEM ANLYZR: CPT

## 2020-03-16 ENCOUNTER — HOSPITAL ENCOUNTER (EMERGENCY)
Age: 50
Discharge: HOME | End: 2020-03-16
Attending: EMERGENCY MEDICINE
Payer: COMMERCIAL

## 2020-03-16 VITALS
WEIGHT: 293 LBS | RESPIRATION RATE: 16 BRPM | DIASTOLIC BLOOD PRESSURE: 70 MMHG | SYSTOLIC BLOOD PRESSURE: 126 MMHG | TEMPERATURE: 98 F | HEIGHT: 65 IN | BODY MASS INDEX: 48.82 KG/M2 | HEART RATE: 70 BPM | OXYGEN SATURATION: 99 %

## 2020-03-16 PROCEDURE — 99282 EMERGENCY DEPT VISIT SF MDM: CPT

## 2020-03-16 PROCEDURE — 6370000000 HC RX 637 (ALT 250 FOR IP): Performed by: EMERGENCY MEDICINE

## 2020-03-16 RX ORDER — LIDOCAINE 4 G/G
1 PATCH TOPICAL ONCE
Status: DISCONTINUED | OUTPATIENT
Start: 2020-03-16 | End: 2020-03-16 | Stop reason: HOSPADM

## 2020-03-16 ASSESSMENT — PAIN SCALES - GENERAL: PAINLEVEL_OUTOF10: 5

## 2020-03-16 NOTE — ED PROVIDER NOTES
EMERGENCY DEPARTMENT ENCOUNTER    Pt Name: Kim Josue  MRN: [id number]  [place] 1970  Date of evaluation: 3/16/20  CHIEF COMPLAINT       Chief Complaint   Patient presents with    Back Pain     states her front and her back are stiff, that she stood up at the dinner table and could not move. HISTORY OF PRESENT ILLNESS   The patient is a 58-year-old female with multiple comorbidities listed below who presents to the ED complaining of paraspinal muscle tenderness. Started early this afternoon. Pain located right lumbar paraspinal muscles. Pain radiates down right leg. No history of trauma. Patient took ibuprofen with moderate relief. No numbness or tingling in lower extremities. No loss of control of bowel or bladder. She does have a history of borderline diabetes but does not have a history of cancer. No reports of fever, cough, chest pain, shortness of breath, abdominal pain, nausea vomiting, changes in urine or stool. REVIEW OF SYSTEMS     Review of Systems   All other systems reviewed and are negative.     PASTMEDICAL HISTORY     Past Medical History:   Diagnosis Date    Asthma     Mild intermittent asthma without complication    Astigmatism, regular 4/12/2017    Bronchitis 2009    Chronic back pain     on 2/18/20 pt is presently on pain mgmnt    Depression 5/1/2014    Diabetes mellitus ([address]     Type II    Frequency of urination     Hyperlipidemia     Hypertension     Increased frequency of urination 8/27/2018    Morbid obesity with body mass index (BMI) of 50.0 to 59.9 in adult ([address]     Myopia 4/12/2017    OAG (open angle glaucoma) suspect, low risk 5/4/2017    Palpitations     occasional    Schizophrenia ([address]     Sleep apnea since Oct. 2017    CPAP nightly    Urge incontinence of urine 8/27/2018    Wears glasses      SURGICAL HISTORY       Past Surgical History:   Procedure Laterality Date    DILATION AND CURETTAGE OF UTERUS N/A 11/14/2017    OPERATIVE HYSTEROSCOPY WITH MYOSURE, EXCISION ENDOMETRIAL POLYPS performed by Edmundo Aceves MD at [address] NERV Bilateral 3/25/2019    BILATERAL KNEE FLORO GUIDED STEROID INJECTION performed by Trino Alonso MD at [address] [place] [address] NERV Bilateral 5/13/2019    SYNVISC  MEDICATION INJECTION   FLUORO GUIDED  FABIOLA KNEES performed by Trino Alonso MD at [address] [place] [address] NERV Bilateral 5/20/2019    INJECTION SYNVISC  FLUORO GUIDED FABIOLA KNEES performed by Trino Alonso MD at [address] [place] [address] NERV Bilateral 6/10/2019    INJECTION SYNVISC FLUORO GUIDED FABIOLA KNEES performed by Trino Alonso MD at [address] [place] [address] NERV Bilateral 11/18/2019    INJECTION SYNVISC BILATERAL KNEE performed by Trino Alonso MD at [address] HYSTEROSCOPY  11/14/2017    exc endometrial polyps    OTHER SURGICAL HISTORY Right 10/17/14    excision dorsal exostosis rt foot    OTHER SURGICAL HISTORY Bilateral 06/10/2019    INJECTION SYNVISC FLUORO GUIDED FABIOLA KNEES (Bilateral )     CURRENT MEDICATIONS       Previous Medications    ALBUTEROL SULFATE  (90 BASE) MCG/ACT INHALER    Inhale 2 puffs into the lungs every 6 hours as needed for Wheezing    AMLODIPINE (NORVASC) 10 MG TABLET    Take 10 mg by mouth nightly Patient takes at night    ARIPIPRAZOLE (ABILIFY) 15 MG TABLET    Take 15 mg by mouth nightly     BISACODYL (DULCOLAX) 5 MG EC TABLET    TAKE 4 TABS AT 10 AM THE DAY PRIOR TO COLONOSCOPY    DICLOFENAC POTASSIUM PO    Take 50 mg by mouth 3 times daily as needed    DICLOFENAC SODIUM (VOLTAREN) 1 % GEL    Apply 4 g topically 4 times daily    ELASTIC BANDAGES & SUPPORTS (KNEE BRACE ADJUSTABLE HINGED) MISC    Use as directed    ELASTIC BANDAGES & SUPPORTS (KNEE BRACE) MISC    2 Units by Does not apply route daily    FLUTICASONE (FLOVENT HFA) 110 MCG/ACT INHALER    Inhale 1 puff into the lungs 2 times daily    GABAPENTIN (NEURONTIN) 300 MG CAPSULE    Take 1 capsule by mouth. [employer] HYDROCHLOROTHIAZIDE (MICROZIDE) 12.5 MG CAPSULE    Take 12.5 mg by mouth nightly     IBUPROFEN (ADVIL;MOTRIN) 800 MG TABLET    Take 1 tablet by mouth every 6 hours as needed for Pain    METFORMIN (GLUCOPHAGE) 500 MG TABLET    Take 500 mg by mouth daily (with breakfast). OXYBUTYNIN (DITROPAN-XL) 10 MG EXTENDED RELEASE TABLET    Take 10 mg by mouth daily    POTASSIUM CHLORIDE (MICRO-K) 10 MEQ CR CAPSULE    Take 10 mEq by mouth daily. PRAVASTATIN (PRAVACHOL) 80 MG TABLET    Take 80 mg by mouth nightly     TRAVATAN Z 0.004 % SOLN OPHTHALMIC SOLUTION    PLACE 1 DROP IN BOTH EYES EACH NIGHT    TRAZODONE (DESYREL) 50 MG TABLET    Take 50 mg by mouth nightly    TRETINOIN (RETIN-A) 0.025 % CREAM    Apply pea sized amount to face, chest and back nightly    TRIAMCINOLONE (KENALOG) 0.025 % CREAM    Apply to rash twice on back of neck twice daily    VALSARTAN (DIOVAN) 40 MG TABLET    Take 40 mg by mouth nightly Patient takes at night     ALLERGIES     is allergic to lisinopril. FAMILY HISTORY     She indicated that her mother is . She indicated that her father is . She indicated that her brother is alive. She indicated that her maternal grandmother is . She indicated that the status of her maternal grandfather is unknown. She indicated that the status of her paternal grandmother is unknown. She indicated that the status of her paternal grandfather is unknown. She indicated that her paternal aunt is .      SOCIAL HISTORY       Social History     Tobacco Use    Smoking status: Never Smoker    Smokeless tobacco: Never Used    Tobacco comment: associates smoking   Substance Use Topics    Alcohol use: No    Drug use: No     PHYSICAL EXAM     INITIAL VITALS: /66   Pulse 71   Temp 98 °F (36.7 °C) (Oral)   Resp 16   Ht 5' 5\" (1.651 m)   Wt (!) 352 lb (159.7 kg)   LMP  (LMP Unknown) Comment: last period was before 2018  SpO2 99% BMI 58.58 kg/m²    Physical Exam  Constitutional:       Appearance: Normal appearance. HENT:      Head: Normocephalic and atraumatic. Nose: Nose normal.   Neck:      Musculoskeletal: Normal range of motion. Comments: No midline neck tenderness  Cardiovascular:      Rate and Rhythm: Normal rate. Abdominal:      General: Abdomen is flat. Musculoskeletal:         General: No swelling, deformity or signs of injury. Comments: No midline back tenderness. Mild right paraspinal lumbar muscle tenderness. Positive right straight leg raise. Neurological:      General: No focal deficit present. Mental Status: She is alert and oriented to person, place, and time. Comments: No saddle anesthesia. Lower extremity sensory 5/5  bilaterally, motor 5/5 bilaterally. Able to walk on toes, able to walk on heels, bilaterally. MEDICAL DECISION MAKING:   The patient is hemodynamically stable, afebrile, nontoxic-appearing. Physical exam notable for right paraspinal muscle tenderness. Positive right straight leg raise. Based on history and exam likely musculoskeletal strain. Low suspicion for cauda equina, epidural abscess. ED plan lidocaine patch, discharge. CRITICAL CARE:       PROCEDURES:    Procedures    DIAGNOSTIC RESULTS   EKG:All EKG's are interpreted by the Emergency Department Physician who either signs or Co-signs this chart in the absence of a cardiologist.        RADIOLOGY:All plain film, CT, MRI, and formal ultrasound images (except ED bedside ultrasound) are read by the radiologist, see reports below, unless otherwisenoted in MDM or here. No orders to display     LABS: All lab results were reviewed by myself, and all abnormals are listed below.   Labs Reviewed - No data to display    EMERGENCY DEPARTMENTCOURSE:         Vitals:    Vitals:    03/16/20 1948   BP: 128/66   Pulse: 71   Resp: 16   Temp: 98 °F (36.7 °C)   TempSrc: Oral   SpO2: 99%   Weight: (!) 352 lb (159.7 kg) Height: 5' 5\" (1.651 m)       The patient was given the following medications while in the emergency department:  Orders Placed This Encounter   Medications    lidocaine 4 % external patch 1 patch     CONSULTS:  None    FINAL IMPRESSION      1. Strain of lumbar region, initial encounter          DISPOSITION/PLAN   DISPOSITION Decision To Discharge 03/16/2020 08:07:34 PM      PATIENT REFERRED TO:  No follow-up provider specified.   DISCHARGE MEDICATIONS:  New Prescriptions    No medications on file     Sergio Zapata MD  Attending Emergency Physician                   Mika Sandy MD  03/16/20 2007       Mika Sandy MD  03/16/20 2008

## 2020-03-17 NOTE — ED NOTES
Pt alert and oriented able to make needs known able to follow direction.  Pt states she just got finished eating dinner and her back started to hurt      Titi Andersons, RN  03/16/20 2006

## 2020-04-22 ENCOUNTER — TELEPHONE (OUTPATIENT)
Dept: PAIN MANAGEMENT | Age: 50
End: 2020-04-22

## 2020-04-30 ENCOUNTER — VIRTUAL VISIT (OUTPATIENT)
Dept: PAIN MANAGEMENT | Age: 50
End: 2020-04-30
Payer: COMMERCIAL

## 2020-04-30 VITALS — BODY MASS INDEX: 58.58 KG/M2 | HEIGHT: 65 IN

## 2020-04-30 PROCEDURE — 99442 PR PHYS/QHP TELEPHONE EVALUATION 11-20 MIN: CPT | Performed by: ANESTHESIOLOGY

## 2020-04-30 ASSESSMENT — ENCOUNTER SYMPTOMS
BACK PAIN: 1
COUGH: 1

## 2020-04-30 NOTE — PROGRESS NOTES
The patient is a 47 y. o. Non-/non  female. Chief Complaint   Patient presents with    Follow Up After Procedure    Knee Pain     bilateral    Foot Pain     bilateral        HPI      S/P:INJECTION SYNVISC FABIOLA KNEES DOS 2/10/20 &2/17/20      Outcome   Any improvement of activity?   No   Any side effects (appetite,leg cramping,facial fleshing):no   Increase of pain:  No  Pain score Today:  8  % of pain relief:50%   Pain diary (medial branch block): No      Past Medical History:   Diagnosis Date    Asthma     Mild intermittent asthma without complication    Astigmatism, regular 4/12/2017    Bronchitis 2009    Chronic back pain     on 2/18/20 pt is presently on pain mgmnt    Depression 5/1/2014    Diabetes mellitus ([facility]     Type II    Frequency of urination     Hyperlipidemia     Hypertension     Increased frequency of urination 8/27/2018    Morbid obesity with body mass index (BMI) of 50.0 to 59.9 in adult ([address]     Myopia 4/12/2017    OAG (open angle glaucoma) suspect, low risk 5/4/2017    Palpitations     occasional    Schizophrenia ([address]     Sleep apnea since Oct. 2017    CPAP nightly    Urge incontinence of urine 8/27/2018    Wears glasses       Past Surgical History:   Procedure Laterality Date    DILATION AND CURETTAGE OF UTERUS N/A 11/14/2017    OPERATIVE HYSTEROSCOPY WITH MYOSURE, EXCISION ENDOMETRIAL POLYPS performed by Duran Renae MD at [address] [place] [address] NERV Bilateral 3/25/2019    BILATERAL KNEE FLORO GUIDED STEROID INJECTION performed by Juliet Epstein MD at [address] [place] [address] NERV Bilateral 5/13/2019    SYNVISC  MEDICATION INJECTION   FLUORO GUIDED  FABIOLA KNEES performed by Juliet Epstein MD at [address] [place] [address] NERV Bilateral 5/20/2019    INJECTION SYNVISC  FLUORO GUIDED FABIOLA KNEES performed by Juliet Epstein MD at [address] [place] [address] NERV Bilateral 6/10/2019    INJECTION SYNVISC

## 2020-06-09 ENCOUNTER — HOSPITAL ENCOUNTER (OUTPATIENT)
Dept: MAMMOGRAPHY | Age: 50
Discharge: HOME | End: 2020-06-11
Payer: COMMERCIAL

## 2020-06-09 PROCEDURE — 77063 BREAST TOMOSYNTHESIS BI: CPT

## 2020-06-16 ENCOUNTER — OFFICE VISIT (OUTPATIENT)
Dept: BARIATRICS/WEIGHT MGMT | Age: 50
End: 2020-06-16
Payer: COMMERCIAL

## 2020-06-16 VITALS
BODY MASS INDEX: 48.82 KG/M2 | HEIGHT: 65 IN | WEIGHT: 293 LBS | DIASTOLIC BLOOD PRESSURE: 80 MMHG | SYSTOLIC BLOOD PRESSURE: 126 MMHG | HEART RATE: 76 BPM

## 2020-06-16 PROBLEM — E66.01 MORBID OBESITY WITH BMI OF 60.0-69.9, ADULT (HCC): Status: ACTIVE | Noted: 2020-06-16

## 2020-06-16 PROCEDURE — 99213 OFFICE O/P EST LOW 20 MIN: CPT | Performed by: NURSE PRACTITIONER

## 2020-06-16 PROCEDURE — 2022F DILAT RTA XM EVC RTNOPTHY: CPT | Performed by: NURSE PRACTITIONER

## 2020-06-16 PROCEDURE — 3046F HEMOGLOBIN A1C LEVEL >9.0%: CPT | Performed by: NURSE PRACTITIONER

## 2020-06-16 PROCEDURE — G8427 DOCREV CUR MEDS BY ELIG CLIN: HCPCS | Performed by: NURSE PRACTITIONER

## 2020-06-16 PROCEDURE — 3017F COLORECTAL CA SCREEN DOC REV: CPT | Performed by: NURSE PRACTITIONER

## 2020-06-16 PROCEDURE — G8417 CALC BMI ABV UP PARAM F/U: HCPCS | Performed by: NURSE PRACTITIONER

## 2020-06-16 PROCEDURE — 1036F TOBACCO NON-USER: CPT | Performed by: NURSE PRACTITIONER

## 2020-06-16 NOTE — PROGRESS NOTES
Attends meetings of clubs or organizations: Not on file     Relationship status: Not on file    Intimate partner violence     Fear of current or ex partner: Not on file     Emotionally abused: Not on file     Physically abused: Not on file     Forced sexual activity: Not on file   Other Topics Concern    Not on file   Social History Narrative    Not on file       Current Medications:  Current Outpatient Medications   Medication Sig Dispense Refill    diclofenac sodium (VOLTAREN) 1 % GEL Apply 4 g topically 4 times daily 4 Tube 3    TRAVATAN Z 0.004 % SOLN ophthalmic solution PLACE 1 DROP IN BOTH EYES EACH NIGHT  1    triamcinolone (KENALOG) 0.025 % cream Apply to rash twice on back of neck twice daily 80 g 2    tretinoin (RETIN-A) 0.025 % cream Apply pea sized amount to face, chest and back nightly 45 g 3    bisacodyl (DULCOLAX) 5 MG EC tablet TAKE 4 TABS AT 10 AM THE DAY PRIOR TO COLONOSCOPY 4 tablet 0    Elastic Bandages & Supports (KNEE BRACE ADJUSTABLE HINGED) MISC Use as directed 1 each 0    Elastic Bandages & Supports (KNEE BRACE) MISC 2 Units by Does not apply route daily 2 each 0    gabapentin (NEURONTIN) 300 MG capsule Take 1 capsule by mouth. .      fluticasone (FLOVENT HFA) 110 MCG/ACT inhaler Inhale 1 puff into the lungs 2 times daily 1 Inhaler 11    albuterol sulfate  (90 Base) MCG/ACT inhaler Inhale 2 puffs into the lungs every 6 hours as needed for Wheezing 1 Inhaler 3    ibuprofen (ADVIL;MOTRIN) 800 MG tablet Take 1 tablet by mouth every 6 hours as needed for Pain 30 tablet 0    DICLOFENAC POTASSIUM PO Take 50 mg by mouth 3 times daily as needed      ARIPiprazole (ABILIFY) 15 MG tablet Take 15 mg by mouth nightly       traZODone (DESYREL) 50 MG tablet Take 50 mg by mouth nightly      pravastatin (PRAVACHOL) 80 MG tablet Take 80 mg by mouth nightly       oxybutynin (DITROPAN-XL) 10 MG extended release tablet Take 10 mg by mouth daily      potassium chloride (MICRO-K) 10 MEQ

## 2020-07-07 ENCOUNTER — OFFICE VISIT (OUTPATIENT)
Dept: BARIATRICS/WEIGHT MGMT | Age: 50
End: 2020-07-07
Payer: COMMERCIAL

## 2020-07-07 VITALS
DIASTOLIC BLOOD PRESSURE: 76 MMHG | TEMPERATURE: 97.7 F | WEIGHT: 293 LBS | SYSTOLIC BLOOD PRESSURE: 118 MMHG | HEIGHT: 65 IN | BODY MASS INDEX: 48.82 KG/M2 | HEART RATE: 84 BPM

## 2020-07-07 PROCEDURE — G8417 CALC BMI ABV UP PARAM F/U: HCPCS | Performed by: NURSE PRACTITIONER

## 2020-07-07 PROCEDURE — 3017F COLORECTAL CA SCREEN DOC REV: CPT | Performed by: NURSE PRACTITIONER

## 2020-07-07 PROCEDURE — 1036F TOBACCO NON-USER: CPT | Performed by: NURSE PRACTITIONER

## 2020-07-07 PROCEDURE — 2022F DILAT RTA XM EVC RTNOPTHY: CPT | Performed by: NURSE PRACTITIONER

## 2020-07-07 PROCEDURE — 99213 OFFICE O/P EST LOW 20 MIN: CPT | Performed by: NURSE PRACTITIONER

## 2020-07-07 PROCEDURE — G8427 DOCREV CUR MEDS BY ELIG CLIN: HCPCS | Performed by: NURSE PRACTITIONER

## 2020-07-07 PROCEDURE — 3046F HEMOGLOBIN A1C LEVEL >9.0%: CPT | Performed by: NURSE PRACTITIONER

## 2020-07-07 NOTE — PROGRESS NOTES
Group Lifestyle Balance Follow-up Progress Note      Subjective     Patient is here for group medical appointment for Group Lifestyle Balance weight management program follow-up for the chronic conditions of DM Type 2, HTN, HLD, Chronic Back Pain, Schizophrenia and Depression (has [facility] with [facility] 1992), Arthritis, Chronic Bilateral Knee Pain. Patient continues on the program and tolerating well. Total weight gain of 11 lbs to date. No current issues. Allergies: Allergies   Allergen Reactions    Lisinopril      Makes her cough       Past Medical History:     Past Medical History:   Diagnosis Date    Asthma     Mild intermittent asthma without complication    Astigmatism, regular 4/12/2017    Bronchitis 2009    Chronic back pain     on 2/18/20 pt is presently on pain mgmnt    Depression 5/1/2014    Diabetes mellitus ([facility]     Type II    Frequency of urination     Hyperlipidemia     Hypertension     Increased frequency of urination 8/27/2018    Morbid obesity with body mass index (BMI) of 50.0 to 59.9 in adult ([address]     Myopia 4/12/2017    OAG (open angle glaucoma) suspect, low risk 5/4/2017    Palpitations     occasional    Schizophrenia ([address]     Sleep apnea since Oct. 2017    CPAP nightly    Urge incontinence of urine 8/27/2018    Wears glasses    .     Past Surgical History:  Past Surgical History:   Procedure Laterality Date    DILATION AND CURETTAGE OF UTERUS N/A 11/14/2017    OPERATIVE HYSTEROSCOPY WITH MYOSURE, EXCISION ENDOMETRIAL POLYPS performed by Ronald Hollis MD at [address] [place] [address] [place] [address] NERV Bilateral 3/25/2019    BILATERAL KNEE FLORO GUIDED STEROID INJECTION performed by Mani Cruz MD at [address] [place] [address] NERV Bilateral 5/13/2019    SYNVISC  MEDICATION INJECTION   FLUORO GUIDED  FABIOLA KNEES performed by Mani Cruz MD at [address] [place] [address] NERV Bilateral 5/20/2019    INJECTION SYNVISC FLUORO GUIDED FABIOLA KNEES performed by Mani Cruz MD at [address] [facility] NERV Bilateral 6/10/2019    INJECTION SYNVISC FLUORO GUIDED FABIOLA KNEES performed by Mani Cruz MD at [facility] [place] [address] NERV Bilateral 11/18/2019    INJECTION SYNVISC BILATERAL KNEE performed by Mani Cruz MD at [facility] HYSTEROSCOPY  11/14/2017    exc endometrial polyps    OTHER SURGICAL HISTORY Right 10/17/14    excision dorsal exostosis rt foot    OTHER SURGICAL HISTORY Bilateral 06/10/2019    INJECTION SYNVISC FLUORO GUIDED FABIOLA KNEES (Bilateral )       Family History:  Family History   Problem Relation Age of Onset    Diabetes Mother     Heart Failure Mother     Diabetes Maternal Grandmother     Heart Failure Maternal Grandmother     No Known Problems Maternal Grandfather     No Known Problems Paternal Grandmother     No Known Problems Paternal Grandfather     Cancer Paternal Aunt        Social History:  Social History     Socioeconomic History    Marital status: Single     Spouse name: Not on file    Number of children: 0    Years of education: Not on file    Highest education level: Not on file   Occupational History    Not on file   Social Needs    Financial resource strain: Not on file    Food insecurity     Worry: Not on file     Inability: Not on file   [employer] [place] needs     Medical: Not on file     Non-medical: Not on file   Tobacco Use    Smoking status: Never Smoker    Smokeless tobacco: Never Used    Tobacco comment: associates smoking   Substance and Sexual Activity    Alcohol use: No    Drug use: No    Sexual activity: Never   Lifestyle    Physical activity     Days per week: Not on file     Minutes per session: Not on file    Stress: Not on file   Relationships    Social connections     Talks on phone: Not on file     Gets together: Not on file     Attends [employer] service: Not on file     Active member of club or organization: Not on file Attends meetings of clubs or organizations: Not on file     Relationship status: Not on file    Intimate partner violence     Fear of current or ex partner: Not on file     Emotionally abused: Not on file     Physically abused: Not on file     Forced sexual activity: Not on file   Other Topics Concern    Not on file   Social History Narrative    Not on file       Current Medications:  Current Outpatient Medications   Medication Sig Dispense Refill    diclofenac sodium (VOLTAREN) 1 % GEL Apply 4 g topically 4 times daily 4 Tube 3    TRAVATAN Z 0.004 % SOLN ophthalmic solution PLACE 1 DROP IN BOTH EYES EACH NIGHT  1    triamcinolone (KENALOG) 0.025 % cream Apply to rash twice on back of neck twice daily 80 g 2    tretinoin (RETIN-A) 0.025 % cream Apply pea sized amount to face, chest and back nightly 45 g 3    bisacodyl (DULCOLAX) 5 MG EC tablet TAKE 4 TABS AT 10 AM THE DAY PRIOR TO COLONOSCOPY 4 tablet 0    Elastic Bandages & Supports (KNEE BRACE ADJUSTABLE HINGED) MISC Use as directed 1 each 0    Elastic Bandages & Supports (KNEE BRACE) MISC 2 Units by Does not apply route daily 2 each 0    gabapentin (NEURONTIN) 300 MG capsule Take 1 capsule by mouth. .      fluticasone (FLOVENT HFA) 110 MCG/ACT inhaler Inhale 1 puff into the lungs 2 times daily 1 Inhaler 11    albuterol sulfate  (90 Base) MCG/ACT inhaler Inhale 2 puffs into the lungs every 6 hours as needed for Wheezing 1 Inhaler 3    ibuprofen (ADVIL;MOTRIN) 800 MG tablet Take 1 tablet by mouth every 6 hours as needed for Pain 30 tablet 0    DICLOFENAC POTASSIUM PO Take 50 mg by mouth 3 times daily as needed      ARIPiprazole (ABILIFY) 15 MG tablet Take 15 mg by mouth nightly       traZODone (DESYREL) 50 MG tablet Take 50 mg by mouth nightly      pravastatin (PRAVACHOL) 80 MG tablet Take 80 mg by mouth nightly       oxybutynin (DITROPAN-XL) 10 MG extended release tablet Take 10 mg by mouth daily      potassium chloride (MICRO-K) 10 MEQ Slips and Self-Defeating Thoughts   ? [address]   ? Make Social Cues Work for [employer]   ? Ways to Stay Motivated   ? Preparing for Long Term Self-Management   ? Take Charge of Your Lifestyle   ? Mindful Eating, Mindful Movement   ? Manage Your Stress   ? Sit Less for Health   ? More Volume, Fewer Calories   X? Stay Active   ? Balance Your Thoughts   ? Heart Health    ? Looking Back and Looking Forward    Total time:  90 minutes, greater than 50% of visit spent in group counseling/education. Assessment:       Diagnosis Orders   1. Diabetes mellitus type 2 in obese ([facility]     2. Essential hypertension     3. Hyperlipidemia, unspecified hyperlipidemia type     4. Schizophrenia, unspecified type ([facility]     5. Depression, unspecified depression type     6. Morbid obesity with BMI of 60.0-69.9, adult ([facility]     7. Primary osteoarthritis of both knees     8. Chronic back pain, unspecified back location, unspecified back pain laterality     9. PCOS (polycystic ovarian syndrome)         Plan:      Track food and weight. Return to clinic as per group medical appointment schedule. Follow-up:  Return in about 1 month (around 8/7/2020). Orders:  No orders of the defined types were placed in this encounter. Prescriptions:  No orders of the defined types were placed in this encounter.       Electronically signed by:  Denis Sneed CNP

## 2020-07-09 ENCOUNTER — HOSPITAL ENCOUNTER (OUTPATIENT)
Age: 50
Discharge: HOME | End: 2020-07-09
Payer: COMMERCIAL

## 2020-07-09 LAB
ANION GAP SERPL CALCULATED.3IONS-SCNC: 12 MMOL/L (ref 9–17)
BUN BLDV-MCNC: 13 MG/DL (ref 6–20)
BUN/CREAT BLD: ABNORMAL (ref 9–20)
CALCIUM SERPL-MCNC: 9.1 MG/DL (ref 8.6–10.4)
CHLORIDE BLD-SCNC: 102 MMOL/L (ref 98–107)
CO2: 26 MMOL/L (ref 20–31)
CREAT SERPL-MCNC: 0.81 MG/DL (ref 0.5–0.9)
GFR AFRICAN AMERICAN: >60 ML/MIN
GFR NON-AFRICAN AMERICAN: >60 ML/MIN
GFR SERPL CREATININE-BSD FRML MDRD: ABNORMAL ML/MIN/{1.73_M2}
GFR SERPL CREATININE-BSD FRML MDRD: ABNORMAL ML/MIN/{1.73_M2}
GLUCOSE BLD-MCNC: 94 MG/DL (ref 70–99)
MAGNESIUM: 2.1 MG/DL (ref 1.6–2.6)
POTASSIUM SERPL-SCNC: 3.6 MMOL/L (ref 3.7–5.3)
SODIUM BLD-SCNC: 140 MMOL/L (ref 135–144)

## 2020-07-09 PROCEDURE — 80048 BASIC METABOLIC PNL TOTAL CA: CPT

## 2020-07-09 PROCEDURE — 36415 COLL VENOUS BLD VENIPUNCTURE: CPT

## 2020-07-09 PROCEDURE — 83735 ASSAY OF MAGNESIUM: CPT

## 2020-07-16 ENCOUNTER — HOSPITAL ENCOUNTER (OUTPATIENT)
Dept: VASCULAR LAB | Age: 50
Discharge: HOME | End: 2020-07-16
Payer: COMMERCIAL

## 2020-07-16 PROCEDURE — 93970 EXTREMITY STUDY: CPT

## 2020-08-04 ENCOUNTER — OFFICE VISIT (OUTPATIENT)
Dept: BARIATRICS/WEIGHT MGMT | Age: 50
End: 2020-08-04
Payer: COMMERCIAL

## 2020-08-04 ENCOUNTER — OFFICE VISIT (OUTPATIENT)
Dept: PAIN MANAGEMENT | Age: 50
End: 2020-08-04
Payer: COMMERCIAL

## 2020-08-04 VITALS
DIASTOLIC BLOOD PRESSURE: 74 MMHG | SYSTOLIC BLOOD PRESSURE: 120 MMHG | HEART RATE: 82 BPM | HEIGHT: 65 IN | BODY MASS INDEX: 48.82 KG/M2 | WEIGHT: 293 LBS

## 2020-08-04 VITALS
HEIGHT: 65 IN | HEART RATE: 74 BPM | DIASTOLIC BLOOD PRESSURE: 74 MMHG | OXYGEN SATURATION: 98 % | TEMPERATURE: 97.6 F | SYSTOLIC BLOOD PRESSURE: 122 MMHG | BODY MASS INDEX: 60.41 KG/M2

## 2020-08-04 PROCEDURE — 1036F TOBACCO NON-USER: CPT | Performed by: NURSE PRACTITIONER

## 2020-08-04 PROCEDURE — G8427 DOCREV CUR MEDS BY ELIG CLIN: HCPCS | Performed by: ANESTHESIOLOGY

## 2020-08-04 PROCEDURE — G8427 DOCREV CUR MEDS BY ELIG CLIN: HCPCS | Performed by: NURSE PRACTITIONER

## 2020-08-04 PROCEDURE — 99213 OFFICE O/P EST LOW 20 MIN: CPT | Performed by: NURSE PRACTITIONER

## 2020-08-04 PROCEDURE — 3046F HEMOGLOBIN A1C LEVEL >9.0%: CPT | Performed by: NURSE PRACTITIONER

## 2020-08-04 PROCEDURE — G8417 CALC BMI ABV UP PARAM F/U: HCPCS | Performed by: NURSE PRACTITIONER

## 2020-08-04 PROCEDURE — 1036F TOBACCO NON-USER: CPT | Performed by: ANESTHESIOLOGY

## 2020-08-04 PROCEDURE — G8417 CALC BMI ABV UP PARAM F/U: HCPCS | Performed by: ANESTHESIOLOGY

## 2020-08-04 PROCEDURE — 3017F COLORECTAL CA SCREEN DOC REV: CPT | Performed by: NURSE PRACTITIONER

## 2020-08-04 PROCEDURE — 2022F DILAT RTA XM EVC RTNOPTHY: CPT | Performed by: NURSE PRACTITIONER

## 2020-08-04 PROCEDURE — 99214 OFFICE O/P EST MOD 30 MIN: CPT | Performed by: ANESTHESIOLOGY

## 2020-08-04 PROCEDURE — 3017F COLORECTAL CA SCREEN DOC REV: CPT | Performed by: ANESTHESIOLOGY

## 2020-08-04 RX ORDER — FLUTICASONE FUROATE AND VILANTEROL TRIFENATATE 100; 25 UG/1; UG/1
1 POWDER RESPIRATORY (INHALATION) DAILY
COMMUNITY
End: 2022-04-29 | Stop reason: SDUPTHER

## 2020-08-04 RX ORDER — MELOXICAM 7.5 MG/1
7.5 TABLET ORAL DAILY
COMMUNITY

## 2020-08-04 RX ORDER — LIDOCAINE 40 MG/G
CREAM TOPICAL PRN
COMMUNITY

## 2020-08-04 ASSESSMENT — ENCOUNTER SYMPTOMS
BACK PAIN: 1
SHORTNESS OF BREATH: 1
COUGH: 1

## 2020-08-04 NOTE — PROGRESS NOTES
The patient is a 45 y. o. Non-/non  female. Chief Complaint   Patient presents with    Knee Pain        HPI    62-year-old woman was seen in our clinic for chronic knee pain  Onset of symptom many years ago  Pain is located over both knees aggravated with standing and walking  Pain is constant  Patient is diagnosed with severe tricompartmental knee arthritis  She is not a surgical candidate because of morbid obesity  She has tried physical therapy NSAIDs and knee cortisone injection  She has been on Visco supplementation and find it very helpful  Last injection was 6 months ago  Patient is interested in scheduling her injection  She is currently using topical NSAIDs  Medication Refill: Voltaren gel    Pain score Today:  7  Adverse effects (Constipation / Nausea / Sedation / sexual Dysfunction / others) : none  Mood: good  Sleep pattern and quality: good  Activity level: fair    Pill count Today:no  Last dose taken: 2 months ago, taking prn  OARRS report reviewed today: yes  ER/Hospitalizations/PCP visit related to pain since last visit:no   Any legal problems e.g. DUI etc.:No  Satisfied with current management: Yes    Opioid Contract:meredith  Last Urine Dug screen dated none      Past Medical History, Past Surgical History, Social History, Allergies and Medications reviewed and updated in EPIC as indicated    Family History reviewed and is noncontributory.         Past Medical History:   Diagnosis Date    Asthma     Mild intermittent asthma without complication    Astigmatism, regular 4/12/2017    Bronchitis 2009    Chronic back pain     on 2/18/20 pt is presently on pain mgmnt    Depression 5/1/2014    Diabetes mellitus ([facility]     Type II    Frequency of urination     Hyperlipidemia     Hypertension     Increased frequency of urination 8/27/2018    Morbid obesity with body mass index (BMI) of 50.0 to 59.9 in adult ([address]     Myopia 4/12/2017    OAG (open angle glaucoma) suspect, low risk 5/4/2017    Palpitations     occasional    Schizophrenia ([address]     Sleep apnea since Oct. 2017    CPAP nightly    Urge incontinence of urine 8/27/2018    Wears glasses       Past Surgical History:   Procedure Laterality Date    DILATION AND CURETTAGE OF UTERUS N/A 11/14/2017    OPERATIVE HYSTEROSCOPY WITH [facility], EXCISION ENDOMETRIAL POLYPS performed by Nehemias Tierney MD at [address] NERV Bilateral 3/25/2019    BILATERAL KNEE FLORO GUIDED STEROID INJECTION performed by Luis Nye MD at [address] [place] [address] NERV Bilateral 5/13/2019    SYNVISC  MEDICATION INJECTION   FLUORO GUIDED  FABIOLA KNEES performed by Luis Nye MD at [address] [facility] NERV Bilateral 5/20/2019    INJECTION SYNVISC  FLUORO GUIDED FABIOLA KNEES performed by Luis Nye MD at [address] [place] [address] NERV Bilateral 6/10/2019    INJECTION SYNVISC FLUORO GUIDED FABIOLA KNEES performed by Luis Nye MD at [address] [place] [address] NERV Bilateral 11/18/2019    INJECTION SYNVISC BILATERAL KNEE performed by Luis Nye MD at [address] HYSTEROSCOPY  11/14/2017    exc endometrial polyps    OTHER SURGICAL HISTORY Right 10/17/14    excision dorsal exostosis rt foot    OTHER SURGICAL HISTORY Bilateral 06/10/2019    INJECTION SYNVISC FLUORO GUIDED FABIOLA KNEES (Bilateral )     Social History     Socioeconomic History    Marital status: Single     Spouse name: None    Number of children: 0    Years of education: None    Highest education level: None   Occupational History    None   Social Needs    Financial resource strain: None    Food insecurity     Worry: None     Inability: None    Transportation needs     Medical: None     Non-medical: None   Tobacco Use    Smoking status: Never Smoker    Smokeless tobacco: Never Used    Tobacco comment: associates smoking   Substance and Sexual Activity    Alcohol use: No    Drug use: No    Sexual activity: Never   Lifestyle    Physical activity     Days per week: None     Minutes per session: None    Stress: None   Relationships    Social connections     Talks on phone: None     Gets together: None     Attends Christian service: None     Active member of club or organization: None     Attends meetings of clubs or organizations: None     Relationship status: None    Intimate partner violence     Fear of current or ex partner: None     Emotionally abused: None     Physically abused: None     Forced sexual activity: None   Other Topics Concern    None   Social History Narrative    None     Family History   Problem Relation Age of Onset    Diabetes Mother     Heart Failure Mother     Diabetes Maternal Grandmother     Heart Failure Maternal Grandmother     No Known Problems Maternal Grandfather     No Known Problems Paternal Grandmother     No Known Problems Paternal Grandfather     Cancer Paternal Aunt      Allergies   Allergen Reactions    Lisinopril      Makes her cough     Lisinopril   Vitals:    08/04/20 0800   BP: 122/74   Pulse: 74   Temp: 97.6 °F (36.4 °C)   SpO2: 98%     Current Outpatient Medications   Medication Sig Dispense Refill    meloxicam (MOBIC) 7.5 MG tablet Take 7.5 mg by mouth daily      lidocaine (LMX) 4 % cream Apply topically as needed      fluticasone-vilanterol (BREO ELLIPTA) 100-25 MCG/INH AEPB inhaler Inhale 1 puff into the lungs daily      diclofenac sodium (VOLTAREN) 1 % GEL Apply 4 g topically 4 times daily 4 Tube 3    Tens Unit MISC by Does not apply route 1 each 0    TRAVATAN Z 0.004 % SOLN ophthalmic solution PLACE 1 DROP IN BOTH EYES EACH NIGHT  1    triamcinolone (KENALOG) 0.025 % cream Apply to rash twice on back of neck twice daily 80 g 2    tretinoin (RETIN-A) 0.025 % cream Apply pea sized amount to face, chest and back nightly 45 g 3    bisacodyl (DULCOLAX) 5 MG EC tablet TAKE 4 TABS AT 10 AM THE DAY PRIOR TO COLONOSCOPY 4 tablet 0    gabapentin (NEURONTIN) 300 MG capsule Take 1 capsule by mouth. .      fluticasone (FLOVENT HFA) 110 MCG/ACT inhaler Inhale 1 puff into the lungs 2 times daily 1 Inhaler 11    albuterol sulfate  (90 Base) MCG/ACT inhaler Inhale 2 puffs into the lungs every 6 hours as needed for Wheezing 1 Inhaler 3    ibuprofen (ADVIL;MOTRIN) 800 MG tablet Take 1 tablet by mouth every 6 hours as needed for Pain 30 tablet 0    DICLOFENAC POTASSIUM PO Take 50 mg by mouth 3 times daily as needed      ARIPiprazole (ABILIFY) 15 MG tablet Take 15 mg by mouth nightly       traZODone (DESYREL) 50 MG tablet Take 50 mg by mouth nightly      pravastatin (PRAVACHOL) 80 MG tablet Take 80 mg by mouth nightly       oxybutynin (DITROPAN-XL) 10 MG extended release tablet Take 10 mg by mouth daily      potassium chloride (MICRO-K) 10 MEQ CR capsule Take 10 mEq by mouth daily.  valsartan (DIOVAN) 40 MG tablet Take 40 mg by mouth nightly Patient takes at night      metFORMIN (GLUCOPHAGE) 500 MG tablet Take 500 mg by mouth daily (with breakfast).  hydrochlorothiazide (MICROZIDE) 12.5 MG capsule Take 12.5 mg by mouth nightly       amLODIPine (NORVASC) 10 MG tablet Take 10 mg by mouth nightly Patient takes at night      Elastic Bandages & Supports (KNEE BRACE ADJUSTABLE HINGED) MISC Use as directed 1 each 0    Elastic Bandages & Supports (KNEE BRACE) MISC 2 Units by Does not apply route daily 2 each 0     No current facility-administered medications for this visit. Review of Systems   Constitutional: Negative. Negative for fever. Respiratory: Positive for cough and shortness of breath. Musculoskeletal: Positive for arthralgias, back pain, gait problem, joint swelling, myalgias and neck pain. Psychiatric/Behavioral: Negative. Objective:  General Appearance:  Well-appearing, in no acute distress, uncomfortable and in pain.     Vital signs: (most recent): Blood pressure 122/74, pulse 74, temperature 97.6 °F (36.4 °C), temperature source Temporal, height 5' 5\" (1.651 m), SpO2 98 %. Vital signs are normal.  No fever. Output: Producing urine and producing stool. HEENT: Normal HEENT exam.    Lungs:  Normal effort and normal respiratory rate. Breath sounds clear to auscultation. She is not in respiratory distress. Heart: Normal rate. Extremities: Normal range of motion. There is no deformity. Neurological: Patient is alert and oriented to person, place and time. Patient has normal coordination. Pupils:  Pupils are equal, round, and reactive to light. Pupils are equal.   Skin:  Warm and dry. No rash or cyanosis. Assessment & Plan  1. Primary osteoarthritis of both knees    2. Morbid obesity with BMI of 60.0-69.9, adult ([facility] [address]    3.  Medication management        Orders Placed This Encounter   Procedures    IR ARTHR/ASP/INJ MAJOR JT/BURSA W US      Orders Placed This Encounter   Medications    diclofenac sodium (VOLTAREN) 1 % GEL     Sig: Apply 4 g topically 4 times daily     Dispense:  4 Tube     Refill:  3    Tens Unit MISC     Sig: by Does not apply route     Dispense:  1 each     Refill:  0          Electronically signed by Renetta Martinez MD on 8/4/2020 at 9:04 AM

## 2020-08-04 NOTE — PROGRESS NOTES
Group Lifestyle Balance Follow-up Progress Note      Subjective     Patient is here for group medical appointment for Group Lifestyle Balance weight management program follow-up for the chronic conditions of DM Type 2, HTN, HLD, Chronic Back Pain, Schizophrenia and Depression (has followed with [address] since 1992), Arthritis, Chronic Bilateral Knee Pain. Patient continues on the program and tolerating well. Total weight gain of 13 lbs to date. No current issues. Allergies: Allergies   Allergen Reactions    Lisinopril      Makes her cough       Past Medical History:     Past Medical History:   Diagnosis Date    Asthma     Mild intermittent asthma without complication    Astigmatism, regular 4/12/2017    Bronchitis 2009    Chronic back pain     on 2/18/20 pt is presently on pain mgmnt    Depression 5/1/2014    Diabetes mellitus ([address]     Type II    Frequency of urination     Hyperlipidemia     Hypertension     Increased frequency of urination 8/27/2018    Morbid obesity with body mass index (BMI) of 50.0 to 59.9 in adult ([address]     Myopia 4/12/2017    OAG (open angle glaucoma) suspect, low risk 5/4/2017    Palpitations     occasional    Schizophrenia ([address]     Sleep apnea since Oct. 2017    CPAP nightly    Urge incontinence of urine 8/27/2018    Wears glasses    .     Past Surgical History:  Past Surgical History:   Procedure Laterality Date    DILATION AND CURETTAGE OF UTERUS N/A 11/14/2017    OPERATIVE HYSTEROSCOPY WITH MYOSURE, EXCISION ENDOMETRIAL POLYPS performed by Aisha Kayser, MD at [address] [place] [address] [place] [address] NERV Bilateral 3/25/2019    BILATERAL KNEE FLORO GUIDED STEROID INJECTION performed by Lexus Colbert MD at [address] [place] [address] NERV Bilateral 5/13/2019    SYNVISC  MEDICATION INJECTION   FLUORO GUIDED  FABIOLA KNEES performed by Lexus Colbert MD at [address] [place] [address] NERV Bilateral 5/20/2019    INJECTION SYNVISC FLUORO GUIDED FABIOLA KNEES performed by Emigdio Araujo MD at [address] [facility] NERV Bilateral 6/10/2019    INJECTION SYNVISC FLUORO GUIDED FABIOLA KNEES performed by Emigdio Araujo MD at [address] [place] [address] NERV Bilateral 11/18/2019    INJECTION SYNVISC BILATERAL KNEE performed by Emigdio Araujo MD at [address] HYSTEROSCOPY  11/14/2017    exc endometrial polyps    OTHER SURGICAL HISTORY Right 10/17/14    excision dorsal exostosis rt foot    OTHER SURGICAL HISTORY Bilateral 06/10/2019    INJECTION SYNVISC FLUORO GUIDED FABIOLA KNEES (Bilateral )       Family History:  Family History   Problem Relation Age of Onset    Diabetes Mother     Heart Failure Mother     Diabetes Maternal Grandmother     Heart Failure Maternal Grandmother     No Known Problems Maternal Grandfather     No Known Problems Paternal Grandmother     No Known Problems Paternal Grandfather     Cancer Paternal Aunt        Social History:  Social History     Socioeconomic History    Marital status: Single     Spouse name: Not on file    Number of children: 0    Years of education: Not on file    Highest education level: Not on file   Occupational History    Not on file   Social Needs    Financial resource strain: Not on file    Food insecurity     Worry: Not on file     Inability: Not on file   [employer] needs     Medical: Not on file     Non-medical: Not on file   Tobacco Use    Smoking status: Never Smoker    Smokeless tobacco: Never Used    Tobacco comment: associates smoking   Substance and Sexual Activity    Alcohol use: No    Drug use: No    Sexual activity: Never   Lifestyle    Physical activity     Days per week: Not on file     Minutes per session: Not on file    Stress: Not on file   Relationships    Social connections     Talks on phone: Not on file     Gets together: Not on file     Attends Sabianism service: Not on file     Active member of club or organization: Not on file this encounter:     ? Welcome Irina Reyna   ? Be a Fat & Calorie    ? Healthy Eating   ? Move Those Muscles   ? Tip the Calorie Balance   ? Take charge of What's Around You   ? Problem Solving   ? Step Up Your Physical Activity Plan   ? Manage Slips and Self-Defeating Thoughts   ? [address]   ? Make Social Cues Work for [place]    ? Ways to Stay Motivated   ? Preparing for Long Term Self-Management   ? Take Charge of Your Lifestyle   ? Mindful Eating, Mindful Movement   ? Manage Your Stress   ? Sit Less for Health  X ? More Volume, Fewer Calories   ? Stay Active   ? Balance Your Thoughts   ? Heart Health    ? Looking Back and Looking Forward    Total time:  90 minutes, greater than 50% of visit spent in group counseling/education. Assessment:       Diagnosis Orders   1. Diabetes mellitus type 2 in obese ([facility]     2. Depression, unspecified depression type     3. Schizophrenia, unspecified type ([facility]     4. Hyperlipidemia, unspecified hyperlipidemia type     5. Essential hypertension     6. Morbid obesity with BMI of 60.0-69.9, adult ([facility] [facility]     7. Chronic back pain, unspecified back location, unspecified back pain laterality     8. PCOS (polycystic ovarian syndrome)     9. Arthritis     10. Bilateral chronic knee pain         Plan:      Track food and weight. Return to clinic as per group medical appointment schedule. Follow-up:  Return in about 1 month (around 9/4/2020). Orders:  No orders of the defined types were placed in this encounter. Prescriptions:  No orders of the defined types were placed in this encounter.       Electronically signed by:  Vipin Munoz CNP

## 2020-08-13 ENCOUNTER — HOSPITAL ENCOUNTER (OUTPATIENT)
Age: 50
Setting detail: SPECIMEN
Discharge: HOME | End: 2020-08-13
Payer: COMMERCIAL

## 2020-08-13 PROCEDURE — G0480 DRUG TEST DEF 1-7 CLASSES: HCPCS

## 2020-08-13 PROCEDURE — 80307 DRUG TEST PRSMV CHEM ANLYZR: CPT

## 2020-08-17 ENCOUNTER — HOSPITAL ENCOUNTER (OUTPATIENT)
Age: 50
Setting detail: OUTPATIENT SURGERY
Discharge: HOME | End: 2020-08-17
Attending: ANESTHESIOLOGY | Admitting: ANESTHESIOLOGY
Payer: COMMERCIAL

## 2020-08-17 ENCOUNTER — APPOINTMENT (OUTPATIENT)
Dept: GENERAL RADIOLOGY | Age: 50
End: 2020-08-17
Attending: ANESTHESIOLOGY
Payer: COMMERCIAL

## 2020-08-17 VITALS
DIASTOLIC BLOOD PRESSURE: 78 MMHG | OXYGEN SATURATION: 100 % | HEART RATE: 76 BPM | RESPIRATION RATE: 18 BRPM | TEMPERATURE: 96.8 F | BODY MASS INDEX: 48.82 KG/M2 | WEIGHT: 293 LBS | SYSTOLIC BLOOD PRESSURE: 128 MMHG | HEIGHT: 65 IN

## 2020-08-17 LAB
GABAPENTIN QNT URINE: 98.9 UG/ML
GLUCOSE BLD-MCNC: 87 MG/DL (ref 65–105)

## 2020-08-17 PROCEDURE — 20611 DRAIN/INJ JOINT/BURSA W/US: CPT | Performed by: ANESTHESIOLOGY

## 2020-08-17 PROCEDURE — 7100000011 HC PHASE II RECOVERY - ADDTL 15 MIN: Performed by: ANESTHESIOLOGY

## 2020-08-17 PROCEDURE — 3600000053 HC PAIN LEVEL 2 ADDL 15 MIN: Performed by: ANESTHESIOLOGY

## 2020-08-17 PROCEDURE — 82947 ASSAY GLUCOSE BLOOD QUANT: CPT

## 2020-08-17 PROCEDURE — 2709999900 HC NON-CHARGEABLE SUPPLY: Performed by: ANESTHESIOLOGY

## 2020-08-17 PROCEDURE — 7100000010 HC PHASE II RECOVERY - FIRST 15 MIN: Performed by: ANESTHESIOLOGY

## 2020-08-17 PROCEDURE — 3600000052 HC PAIN LEVEL 2 BASE: Performed by: ANESTHESIOLOGY

## 2020-08-17 PROCEDURE — 2500000003 HC RX 250 WO HCPCS: Performed by: ANESTHESIOLOGY

## 2020-08-17 PROCEDURE — 6360000002 HC RX W HCPCS: Performed by: ANESTHESIOLOGY

## 2020-08-17 RX ORDER — LIDOCAINE HYDROCHLORIDE 10 MG/ML
INJECTION, SOLUTION INFILTRATION; PERINEURAL PRN
Status: DISCONTINUED | OUTPATIENT
Start: 2020-08-17 | End: 2020-08-17 | Stop reason: ALTCHOICE

## 2020-08-17 RX ORDER — SODIUM CHLORIDE 0.9 % (FLUSH) 0.9 %
10 SYRINGE (ML) INJECTION EVERY 12 HOURS SCHEDULED
Status: DISCONTINUED | OUTPATIENT
Start: 2020-08-17 | End: 2020-08-17 | Stop reason: HOSPADM

## 2020-08-17 ASSESSMENT — PAIN - FUNCTIONAL ASSESSMENT: PAIN_FUNCTIONAL_ASSESSMENT: 0-10

## 2020-08-17 ASSESSMENT — PAIN SCALES - GENERAL
PAINLEVEL_OUTOF10: 8
PAINLEVEL_OUTOF10: 0

## 2020-08-17 ASSESSMENT — PAIN DESCRIPTION - DESCRIPTORS: DESCRIPTORS: SORE

## 2020-08-17 NOTE — H&P
History and Physical Update    Pt Name: Brandi Schroeder  MRN: [id number]  YOB: 1970  Date of evaluation: 8/17/2020      [x] I have reviewed the Pain Management Progress Note by Dr Alma Werner dated 8/4/20 in epic which meets the criteria for an Interval History and Physical note and is attached below. [x] I have examined  Alma Delia Goldsmith  There are no changes to the patient who is scheduled for a Local U/S guided bilateral synvisc knee injections by Dr Alma Werner for bilateral knee OA. The patient denies health changes, fever, chills, cough, wheezing, increased SOB, chest pain, open sores or wounds. +DM POC BS 80  Patient is local and took Ibuprofen 800mg today and diclofenac     Vital signs: BP (!) 150/74   Pulse 77   Temp 97.5 °F (36.4 °C) (Temporal)   Resp 20   Ht 5' 5\" (1.651 m)   Wt (!) 365 lb (165.6 kg)   LMP  (LMP Unknown) Comment: last period was before March 2018  SpO2 100%   BMI 60.74 kg/m²     Allergies:  Lisinopril    Medications:    Prior to Admission medications    Medication Sig Start Date End Date Taking? Authorizing Provider   meloxicam (MOBIC) 7.5 MG tablet Take 7.5 mg by mouth daily   Yes Historical Provider, MD   TRAVATAN Z 0.004 % SOLN ophthalmic solution PLACE 1 DROP IN BOTH EYES EACH NIGHT 10/4/19  Yes Historical Provider, MD   gabapentin (NEURONTIN) 300 MG capsule Take 1 capsule by mouth. Shawnee Ye Historical Provider, MD   ibuprofen (ADVIL;MOTRIN) 800 MG tablet Take 1 tablet by mouth every 6 hours as needed for Pain 11/14/17  Yes Tiffany Flores,    DICLOFENAC POTASSIUM PO Take 50 mg by mouth 3 times daily as needed   Yes Historical Provider, MD   ARIPiprazole (ABILIFY) 15 MG tablet Take 15 mg by mouth nightly    Yes Historical Provider, MD   traZODone (DESYREL) 50 MG tablet Take 50 mg by mouth nightly   Yes Historical Provider, MD   pravastatin (PRAVACHOL) 80 MG tablet Take 80 mg by mouth nightly    Yes Historical Provider, MD   oxybutynin (DITROPAN-XL) 10 MG extended pain since last visit:no   Any legal problems e.g. DUI etc.:No  Satisfied with current management: Yes     Opioid Contract:meredith  Last Urine Dug screen dated none        Past Medical History, Past Surgical History, Social History, Allergies and Medications reviewed and updated in EPIC as indicated     Family History reviewed and is noncontributory.            Past Medical History        Past Medical History:   Diagnosis Date    Asthma       Mild intermittent asthma without complication    Astigmatism, regular 4/12/2017    Bronchitis 2009    Chronic back pain       on 2/18/20 pt is presently on pain mgmnt    Depression 5/1/2014    Diabetes mellitus ([address]       Type II    Frequency of urination      Hyperlipidemia      Hypertension      Increased frequency of urination 8/27/2018    Morbid obesity with body mass index (BMI) of 50.0 to 59.9 in adult ([address]      Myopia 4/12/2017    OAG (open angle glaucoma) suspect, low risk 5/4/2017    Palpitations       occasional    Schizophrenia ([address]      Sleep apnea since Oct. 2017     CPAP nightly    Urge incontinence of urine 8/27/2018    Wears glasses           Past Surgical History         Past Surgical History:   Procedure Laterality Date    DILATION AND CURETTAGE OF UTERUS N/A 11/14/2017     OPERATIVE HYSTEROSCOPY WITH MYOSURE, EXCISION ENDOMETRIAL POLYPS performed by Alirio Li MD at [facility] NERV Bilateral 3/25/2019     BILATERAL KNEE FLORO GUIDED STEROID INJECTION performed by Case Lane MD at [address] [place] [address] NERV Bilateral 5/13/2019     SYNVISC  MEDICATION INJECTION   FLUORO GUIDED  FABIOLA KNEES performed by Case Lane MD at [address] [place] [address] NERV Bilateral 5/20/2019     INJECTION SYNVISC  FLUORO GUIDED FABIOLA KNEES performed by Case Lane MD at [address] [place] [address] NERV Bilateral 6/10/2019     INJECTION SYNVISC FLUORO GUIDED FABIOLA KNEES performed by Kandy Perez MD at [address] [facility] [address] [place] [address] NERV Bilateral 11/18/2019     INJECTION SYNVISC BILATERAL KNEE performed by Kandy Perez MD at [address] [facility] [address] HYSTEROSCOPY   11/14/2017     exc endometrial polyps    OTHER SURGICAL HISTORY Right 10/17/14     excision dorsal exostosis rt foot    OTHER SURGICAL HISTORY Bilateral 06/10/2019     INJECTION SYNVISC FLUORO GUIDED FABIOLA KNEES (Bilateral )        Social History   Social History            Socioeconomic History    Marital status: Single       Spouse name: None    Number of children: 0    Years of education: None    Highest education level: None   Occupational History    None   Social Needs    Financial resource strain: None    Food insecurity       Worry: None       Inability: None    Transportation needs       Medical: None       Non-medical: None   Tobacco Use    Smoking status: Never Smoker    Smokeless tobacco: Never Used    Tobacco comment: associates smoking   Substance and Sexual Activity    Alcohol use: No    Drug use: No    Sexual activity: Never   Lifestyle    Physical activity       Days per week: None       Minutes per session: None    Stress: None   Relationships    Social connections       Talks on phone: None       Gets together: None       Attends Catholic service: None       Active member of club or organization: None       Attends meetings of clubs or organizations: None       Relationship status: None    Intimate partner violence       Fear of current or ex partner: None       Emotionally abused: None       Physically abused: None       Forced sexual activity: None   Other Topics Concern    None   Social History Narrative    None        Family History         Family History   Problem Relation Age of Onset    Diabetes Mother      Heart Failure Mother      Diabetes Maternal Grandmother      Heart Failure Maternal Grandmother      No Known Problems Maternal Grandfather      No Known Problems Paternal Grandmother      No Known Problems Paternal Grandfather      Cancer Paternal Aunt                Allergies   Allergen Reactions    Lisinopril         Makes her cough     Lisinopril       Vitals:     08/04/20 0800   BP: 122/74   Pulse: 74   Temp: 97.6 °F (36.4 °C)   SpO2: 98%     Current Facility-Administered Medications          Current Outpatient Medications   Medication Sig Dispense Refill    meloxicam (MOBIC) 7.5 MG tablet Take 7.5 mg by mouth daily        lidocaine (LMX) 4 % cream Apply topically as needed        fluticasone-vilanterol (BREO ELLIPTA) 100-25 MCG/INH AEPB inhaler Inhale 1 puff into the lungs daily        diclofenac sodium (VOLTAREN) 1 % GEL Apply 4 g topically 4 times daily 4 Tube 3    Tens Unit MISC by Does not apply route 1 each 0    TRAVATAN Z 0.004 % SOLN ophthalmic solution PLACE 1 DROP IN BOTH EYES EACH NIGHT   1    triamcinolone (KENALOG) 0.025 % cream Apply to rash twice on back of neck twice daily 80 g 2    tretinoin (RETIN-A) 0.025 % cream Apply pea sized amount to face, chest and back nightly 45 g 3    bisacodyl (DULCOLAX) 5 MG EC tablet TAKE 4 TABS AT 10 AM THE DAY PRIOR TO COLONOSCOPY 4 tablet 0    gabapentin (NEURONTIN) 300 MG capsule Take 1 capsule by mouth. .        fluticasone (FLOVENT HFA) 110 MCG/ACT inhaler Inhale 1 puff into the lungs 2 times daily 1 Inhaler 11    albuterol sulfate  (90 Base) MCG/ACT inhaler Inhale 2 puffs into the lungs every 6 hours as needed for Wheezing 1 Inhaler 3    ibuprofen (ADVIL;MOTRIN) 800 MG tablet Take 1 tablet by mouth every 6 hours as needed for Pain 30 tablet 0    DICLOFENAC POTASSIUM PO Take 50 mg by mouth 3 times daily as needed        ARIPiprazole (ABILIFY) 15 MG tablet Take 15 mg by mouth nightly         traZODone (DESYREL) 50 MG tablet Take 50 mg by mouth nightly        pravastatin (PRAVACHOL) 80 MG tablet Take 80 mg by mouth nightly         oxybutynin (DITROPAN-XL) 10 MG extended release tablet Take 10 mg by Medication management            Orders Placed This Encounter   Procedures    IR ARTHR/ASP/INJ MAJOR JT/BURSA W US      Encounter Medications         Orders Placed This Encounter   Medications    diclofenac sodium (VOLTAREN) 1 % GEL       Sig: Apply 4 g topically 4 times daily       Dispense:  4 Tube       Refill:  3    Tens Unit MISC       Sig: by Does not apply route       Dispense:  1 each       Refill:  0               Electronically signed by Luis Nye MD on 8/4/2020 at 9:04 AM            Revision History

## 2020-08-17 NOTE — OP NOTE
Operative Note      Patient: Leann Denton  YOB: 1970  MRN: [id number]    Date of Procedure: 8/17/2020    Pre-Op Diagnosis: DX OA FABIOLA KNEES    Post-Op Diagnosis: Same       Procedure(s):  1ST -U/S GUIDED KNEE SYNVISC INJECTION    Surgeon(s):  Lexus Colbert MD    Assistant:   * No surgical staff found *    Anesthesia: Local    Estimated Blood Loss (mL): Minimal    Complications: None    Specimens:   * No specimens in log *    Implants:  * No implants in log *      Drains: * No LDAs found *    Findings: n/a    Detailed Description of Procedure:       Preoperative Diagnosis: Osteoarthritis of the Bilateral knee. Postoperative Diagnosis: Osteoarthritis of the Bilateral knee. Procedure Performed:  Injection of Bilateral knee with US Guidance. Indication for the Procedure: The patient has Bilateral knee pain not responding to conservative treatment diagnosed with Knee OA  The procedure and the risks were discussed with the patient and an informed consent was obtained. Procedure: The patient is placed in supine/sitting position. Skin over the Bilateral knee was prepped with Betadine and draped in sterile manner. Using [address] with a high frequency linear probe area was scanned to identify the supra patellar recess. Then skin and deep tissues lateral to the patellar tendon just above the tibial plateau were infiltrated with 2  ml of 1% lidocaine. The #22-gauge  spinal needle was introduced through the skin wheal. Then the needle was advanced with US guidance into the supra patellar recess. Finally 3 ml of treatment solution containing 2 mL of Synvisc were injected into the joint. The needle is removed and a Band-Aid was placed over the needle insertion site. Procedure was first performed on the left side and was then repeated on the right side with same technique    Patient tolerated the procedure well and the vital signs remained stable.  Patient will be seen in the pain clinic for follow up and further planning.     Electronically signed by Saulo Farr MD on 8/17/2020 at 1:59 PM          Electronically signed by Saulo Farr MD on 8/17/2020 at 1:58 PM unknown

## 2020-08-24 ENCOUNTER — HOSPITAL ENCOUNTER (OUTPATIENT)
Age: 50
Setting detail: OUTPATIENT SURGERY
Discharge: HOME | End: 2020-08-24
Attending: ANESTHESIOLOGY | Admitting: ANESTHESIOLOGY
Payer: COMMERCIAL

## 2020-08-24 VITALS
DIASTOLIC BLOOD PRESSURE: 75 MMHG | WEIGHT: 293 LBS | OXYGEN SATURATION: 100 % | TEMPERATURE: 96.8 F | HEIGHT: 65 IN | BODY MASS INDEX: 48.82 KG/M2 | SYSTOLIC BLOOD PRESSURE: 140 MMHG | RESPIRATION RATE: 18 BRPM | HEART RATE: 75 BPM

## 2020-08-24 LAB — GLUCOSE BLD-MCNC: 93 MG/DL (ref 65–105)

## 2020-08-24 PROCEDURE — 7100000011 HC PHASE II RECOVERY - ADDTL 15 MIN: Performed by: ANESTHESIOLOGY

## 2020-08-24 PROCEDURE — 82947 ASSAY GLUCOSE BLOOD QUANT: CPT

## 2020-08-24 PROCEDURE — 3600000052 HC PAIN LEVEL 2 BASE: Performed by: ANESTHESIOLOGY

## 2020-08-24 PROCEDURE — 7100000010 HC PHASE II RECOVERY - FIRST 15 MIN: Performed by: ANESTHESIOLOGY

## 2020-08-24 PROCEDURE — 2709999900 HC NON-CHARGEABLE SUPPLY: Performed by: ANESTHESIOLOGY

## 2020-08-24 PROCEDURE — 6360000002 HC RX W HCPCS: Performed by: ANESTHESIOLOGY

## 2020-08-24 PROCEDURE — 20611 DRAIN/INJ JOINT/BURSA W/US: CPT | Performed by: ANESTHESIOLOGY

## 2020-08-24 ASSESSMENT — PAIN SCALES - GENERAL
PAINLEVEL_OUTOF10: 8
PAINLEVEL_OUTOF10: 8
PAINLEVEL_OUTOF10: 0

## 2020-08-24 ASSESSMENT — PAIN - FUNCTIONAL ASSESSMENT: PAIN_FUNCTIONAL_ASSESSMENT: 0-10

## 2020-08-24 ASSESSMENT — PAIN DESCRIPTION - DESCRIPTORS: DESCRIPTORS: ACHING

## 2020-08-24 NOTE — OP NOTE
Operative Note      Patient: Mike Hyatt  YOB: 1970  MRN: [id number]    Date of Procedure: 8/24/2020    Pre-Op Diagnosis: DX OA FABIOLA KNEES    Post-Op Diagnosis: Same       Procedure(s):  2ND -U/S GUIDED KNEE SYNVISC INJECTION    Surgeon(s):  Cindy Almonte MD    Assistant:   * No surgical staff found *    Anesthesia: None    Estimated Blood Loss (mL): Minimal    Complications: None    Specimens:   * No specimens in log *    Implants:  * No implants in log *      Drains: * No LDAs found *    Findings: n/a    Detailed Description of Procedure:       Preoperative Diagnosis: Osteoarthritis of the Bilateral knee. Postoperative Diagnosis: Osteoarthritis of the Bilateral knee. Procedure Performed:  Injection of Bilateral knee with US Guidance. Indication for the Procedure: The patient has Bilateral knee pain not responding to conservative treatment diagnosed with Knee OA  The procedure and the risks were discussed with the patient and an informed consent was obtained. Procedure: The patient is placed in supine/sitting position. Skin over the Bilateral knee was prepped with Betadine and draped in sterile manner. Using [address] with a high frequency linear probe area was scanned to identify the supra patellar recess. Then skin and deep tissues lateral to the patellar tendon just above the tibial plateau were infiltrated with 2  ml of 1% lidocaine. The #22-gauge  spinal needle was introduced through the skin wheal. Then the needle was advanced with US guidance into the supra patellar recess. Finally treatment solution containing 2 ml of SYNVISC were injected into the joint. The needle is removed and a Band-Aid was placed over the needle insertion site. It was first performed on the left knee and was then repeated on the right side with same technique  Patient tolerated the procedure well and the vital signs remained stable.  Patient will be seen in the pain clinic for follow up and

## 2020-08-24 NOTE — H&P
Take 12.5 mg by mouth nightly     Historical Provider, MD   amLODIPine (NORVASC) 10 MG tablet Take 10 mg by mouth nightly Patient takes at night    Historical Provider, MD       This is a 45 y.o. female who is pleasant, cooperative, alert and oriented x 3, in no acute distress. Heart: Distant heart sounds. Regular rate and rhythm without murmur, gallop, or rub. Lungs: Normal respiratory effort, unlabored, and clear to auscultation without wheezes or rales bilaterally. Abdomen: Rotund. Soft, non-tender, and active bowel sounds. Pedal pulses: are palpable bilaterally. Moderate edema in bilateral lower extremities. Labs:  No results for input(s): HGB, HCT, WBC, MCV, PLT, NA, K, CL, CO2, BUN, CREATININE, GLUCOSE, INR, PROTIME, APTT, AST, ALT, LABALBU, HCG in the last 720 hours. No results for input(s): COVID19 in the last 720 hours. Alondra PATRICIO, FNP-BC  Electronically signed 8/24/2020 at 10:55 Veronica Peña MD    Physician    Specialty:  Pain Management    Progress Notes       Signed    Encounter Date:  8/4/2020          Related encounter: Office Visit from 8/4/2020 in [facility]          Signed        Expand All Collapse All     Show:Clear all  [x]Manual[x]Template[]Copied    Added by:  [x]Cata Boland MD    []Sera for details   The patient is a 45 y. o. Non-/non  female.      Chief Complaint   Patient presents with    Knee Pain         HPI     58-year-old woman was seen in our clinic for chronic knee pain  Onset of symptom many years ago  Pain is located over both knees aggravated with standing and walking  Pain is constant  Patient is diagnosed with severe tricompartmental knee arthritis  She is not a surgical candidate because of morbid obesity  She has tried physical therapy NSAIDs and knee cortisone injection  She has been on Visco supplementation and find it very helpful  Last injection was 6 months ago  Patient is Angus Hopkins MD at [address] [place] [address] NERV Bilateral 5/13/2019     SYNVISC  MEDICATION INJECTION   FLUORO GUIDED  FABIOLA KNEES performed by Angus Hopkins MD at [address] [place] [address] NERV Bilateral 5/20/2019     INJECTION SYNVISC  FLUORO GUIDED FABIOLA KNEES performed by Angus Hopkins MD at [address] [place] [address] 37 NERV Bilateral 6/10/2019     INJECTION SYNVISC FLUORO GUIDED FABIOLA KNEES performed by Angus Hopkins MD at [address] [place] [address] 37 NERV Bilateral 11/18/2019     INJECTION SYNVISC BILATERAL KNEE performed by Angus Hopkins MD at [address] [place] [address] HYSTEROSCOPY   11/14/2017     exc endometrial polyps    OTHER SURGICAL HISTORY Right 10/17/14     excision dorsal exostosis rt foot    OTHER SURGICAL HISTORY Bilateral 06/10/2019     INJECTION SYNVISC FLUORO GUIDED FABIOLA KNEES (Bilateral )        Social History   Social History            Socioeconomic History    Marital status: Single       Spouse name: None    Number of children: 0    Years of education: None    Highest education level: None   Occupational History    None   Social Needs    Financial resource strain: None    Food insecurity       Worry: None       Inability: None    Transportation needs       Medical: None       Non-medical: None   Tobacco Use    Smoking status: Never Smoker    Smokeless tobacco: Never Used    Tobacco comment: associates smoking   Substance and Sexual Activity    Alcohol use: No    Drug use: No    Sexual activity: Never   Lifestyle    Physical activity       Days per week: None       Minutes per session: None    Stress: None   Relationships    Social connections       Talks on phone: None       Gets together: None       Attends Holiness service: None       Active member of club or organization: None       Attends meetings of clubs or organizations: None       Relationship status: None    Intimate partner violence       Fear of current or ex partner: None Emotionally abused: None       Physically abused: None       Forced sexual activity: None   Other Topics Concern    None   Social History Narrative    None        Family History         Family History   Problem Relation Age of Onset    Diabetes Mother      Heart Failure Mother      Diabetes Maternal Grandmother      Heart Failure Maternal Grandmother      No Known Problems Maternal Grandfather      No Known Problems Paternal Grandmother      No Known Problems Paternal Grandfather      Cancer Paternal Aunt                Allergies   Allergen Reactions    Lisinopril         Makes her cough     Lisinopril       Vitals:     08/04/20 0800   BP: 122/74   Pulse: 74   Temp: 97.6 °F (36.4 °C)   SpO2: 98%     Current Facility-Administered Medications          Current Outpatient Medications   Medication Sig Dispense Refill    meloxicam (MOBIC) 7.5 MG tablet Take 7.5 mg by mouth daily        lidocaine (LMX) 4 % cream Apply topically as needed        fluticasone-vilanterol (BREO ELLIPTA) 100-25 MCG/INH AEPB inhaler Inhale 1 puff into the lungs daily        diclofenac sodium (VOLTAREN) 1 % GEL Apply 4 g topically 4 times daily 4 Tube 3    Tens Unit MISC by Does not apply route 1 each 0    TRAVATAN Z 0.004 % SOLN ophthalmic solution PLACE 1 DROP IN BOTH EYES EACH NIGHT   1    triamcinolone (KENALOG) 0.025 % cream Apply to rash twice on back of neck twice daily 80 g 2    tretinoin (RETIN-A) 0.025 % cream Apply pea sized amount to face, chest and back nightly 45 g 3    bisacodyl (DULCOLAX) 5 MG EC tablet TAKE 4 TABS AT 10 AM THE DAY PRIOR TO COLONOSCOPY 4 tablet 0    gabapentin (NEURONTIN) 300 MG capsule Take 1 capsule by mouth. .        fluticasone (FLOVENT HFA) 110 MCG/ACT inhaler Inhale 1 puff into the lungs 2 times daily 1 Inhaler 11    albuterol sulfate  (90 Base) MCG/ACT inhaler Inhale 2 puffs into the lungs every 6 hours as needed for Wheezing 1 Inhaler 3    ibuprofen (ADVIL;MOTRIN) 800 MG tablet Take 1 tablet by mouth every 6 hours as needed for Pain 30 tablet 0    DICLOFENAC POTASSIUM PO Take 50 mg by mouth 3 times daily as needed        ARIPiprazole (ABILIFY) 15 MG tablet Take 15 mg by mouth nightly         traZODone (DESYREL) 50 MG tablet Take 50 mg by mouth nightly        pravastatin (PRAVACHOL) 80 MG tablet Take 80 mg by mouth nightly         oxybutynin (DITROPAN-XL) 10 MG extended release tablet Take 10 mg by mouth daily        potassium chloride (MICRO-K) 10 MEQ CR capsule Take 10 mEq by mouth daily.  valsartan (DIOVAN) 40 MG tablet Take 40 mg by mouth nightly Patient takes at night        metFORMIN (GLUCOPHAGE) 500 MG tablet Take 500 mg by mouth daily (with breakfast).  hydrochlorothiazide (MICROZIDE) 12.5 MG capsule Take 12.5 mg by mouth nightly         amLODIPine (NORVASC) 10 MG tablet Take 10 mg by mouth nightly Patient takes at night        Elastic Bandages & Supports (KNEE BRACE ADJUSTABLE HINGED) MISC Use as directed 1 each 0    Elastic Bandages & Supports (KNEE BRACE) MISC 2 Units by Does not apply route daily 2 each 0      No current facility-administered medications for this visit. Review of Systems   Constitutional: Negative. Negative for fever. Respiratory: Positive for cough and shortness of breath. Musculoskeletal: Positive for arthralgias, back pain, gait problem, joint swelling, myalgias and neck pain. Psychiatric/Behavioral: Negative. Objective:  General Appearance:  Well-appearing, in no acute distress, uncomfortable and in pain. Vital signs: (most recent): Blood pressure 122/74, pulse 74, temperature 97.6 °F (36.4 °C), temperature source Temporal, height 5' 5\" (1.651 m), SpO2 98 %. Vital signs are normal.  No fever. Output: Producing urine and producing stool. HEENT: Normal HEENT exam.    Lungs:  Normal effort and normal respiratory rate. Breath sounds clear to auscultation. She is not in respiratory distress.     Heart:

## 2020-08-31 ENCOUNTER — HOSPITAL ENCOUNTER (OUTPATIENT)
Age: 50
Setting detail: OUTPATIENT SURGERY
Discharge: HOME | End: 2020-08-31
Attending: ANESTHESIOLOGY | Admitting: ANESTHESIOLOGY
Payer: COMMERCIAL

## 2020-08-31 VITALS
DIASTOLIC BLOOD PRESSURE: 54 MMHG | OXYGEN SATURATION: 100 % | SYSTOLIC BLOOD PRESSURE: 126 MMHG | BODY MASS INDEX: 48.82 KG/M2 | HEIGHT: 65 IN | RESPIRATION RATE: 21 BRPM | WEIGHT: 293 LBS | HEART RATE: 77 BPM | TEMPERATURE: 97.7 F

## 2020-08-31 LAB — GLUCOSE BLD-MCNC: 103 MG/DL (ref 65–105)

## 2020-08-31 PROCEDURE — 3600000053 HC PAIN LEVEL 2 ADDL 15 MIN: Performed by: ANESTHESIOLOGY

## 2020-08-31 PROCEDURE — 7100000011 HC PHASE II RECOVERY - ADDTL 15 MIN: Performed by: ANESTHESIOLOGY

## 2020-08-31 PROCEDURE — 20611 DRAIN/INJ JOINT/BURSA W/US: CPT | Performed by: ANESTHESIOLOGY

## 2020-08-31 PROCEDURE — 82947 ASSAY GLUCOSE BLOOD QUANT: CPT

## 2020-08-31 PROCEDURE — 6360000002 HC RX W HCPCS: Performed by: ANESTHESIOLOGY

## 2020-08-31 PROCEDURE — 3600000052 HC PAIN LEVEL 2 BASE: Performed by: ANESTHESIOLOGY

## 2020-08-31 PROCEDURE — 7100000010 HC PHASE II RECOVERY - FIRST 15 MIN: Performed by: ANESTHESIOLOGY

## 2020-08-31 PROCEDURE — 2709999900 HC NON-CHARGEABLE SUPPLY: Performed by: ANESTHESIOLOGY

## 2020-08-31 RX ORDER — SODIUM CHLORIDE 0.9 % (FLUSH) 0.9 %
10 SYRINGE (ML) INJECTION 2 TIMES DAILY
Status: DISCONTINUED | OUTPATIENT
Start: 2020-08-31 | End: 2020-09-01 | Stop reason: HOSPADM

## 2020-08-31 ASSESSMENT — PAIN - FUNCTIONAL ASSESSMENT: PAIN_FUNCTIONAL_ASSESSMENT: 0-10

## 2020-08-31 ASSESSMENT — PAIN DESCRIPTION - FREQUENCY: FREQUENCY: CONTINUOUS

## 2020-08-31 ASSESSMENT — PAIN DESCRIPTION - DESCRIPTORS
DESCRIPTORS: ACHING
DESCRIPTORS: SORE

## 2020-08-31 ASSESSMENT — PAIN SCALES - GENERAL
PAINLEVEL_OUTOF10: 0
PAINLEVEL_OUTOF10: 3
PAINLEVEL_OUTOF10: 7

## 2020-08-31 ASSESSMENT — PAIN DESCRIPTION - ORIENTATION: ORIENTATION: RIGHT;LEFT

## 2020-08-31 ASSESSMENT — PAIN DESCRIPTION - PAIN TYPE: TYPE: CHRONIC PAIN

## 2020-08-31 ASSESSMENT — PAIN DESCRIPTION - LOCATION: LOCATION: KNEE

## 2020-08-31 ASSESSMENT — PULMONARY FUNCTION TESTS: PIF_VALUE: 1

## 2020-08-31 NOTE — H&P
History and Physical Update    Pt Name: Iris Knowles  MRN: [id number]  YOB: 1970  Date of evaluation: 8/31/2020      [x] I have reviewed the pain management progress note found in Epic by Dr. Jenniffer Granados from 08/04/2020 which meets the criteria for an Interval History and Physical note. [x] I have examined  Iris Knowles a 45 y.o., female who is scheduled for a third ultrasound guided knee Synvisc injection by Dr. Jenniffer Granados due to bilateral knee OA. The patient denies health changes since her appointment with Dr. Jenniffer Granados on 08/04/2020. S/p first ultrasound guided knee Synvisc injection on 08/17/2020. S/p second ultrasound guided knee Synvisc injection on 08/24/2020. Pt denies fever, chills, productive cough, current SOB, chest pain, open sores, rashes, and wounds. Pt has occasional dyspnea with exertion. History of diabetes. Pt's POC blood glucose today is 103. Diclofenac potassium PO, Mobic, and Advil were last taken on 08/30/2020. Vital signs: /76   Pulse 80   Temp 96.9 °F (36.1 °C) (Temporal)   Resp 18   Ht 5' 5\" (1.651 m)   Wt (!) 365 lb (165.6 kg)   LMP  (LMP Unknown) Comment: last period was before March 2018  SpO2 98%   BMI 60.74 kg/m²      Allergies:  Lisinopril    Past medical history, surgical history, social history, and family history were reviewed and updated in EPIC as indicated. Medications:    Prior to Admission medications    Medication Sig Start Date End Date Taking? Authorizing Provider   meloxicam (MOBIC) 7.5 MG tablet Take 7.5 mg by mouth daily   Yes Historical Provider, MD   lidocaine (LMX) 4 % cream Apply topically as needed   Yes Historical Provider, MD   diclofenac sodium (VOLTAREN) 1 % GEL Apply 4 g topically 4 times daily 8/4/20 10/13/20 Yes León Vieira MD   TRAVATAN Z 0.004 % SOLN ophthalmic solution PLACE 1 DROP IN BOTH EYES EACH NIGHT 10/4/19  Yes Historical Provider, MD   gabapentin (NEURONTIN) 300 MG capsule Take 1 capsule by mouth. .   Yes Historical Provider, MD   ibuprofen (ADVIL;MOTRIN) 800 MG tablet Take 1 tablet by mouth every 6 hours as needed for Pain 11/14/17  Yes Claudeen Buttery,    DICLOFENAC POTASSIUM PO Take 50 mg by mouth 3 times daily as needed   Yes Historical Provider, MD   ARIPiprazole (ABILIFY) 15 MG tablet Take 15 mg by mouth nightly    Yes Historical Provider, MD   traZODone (DESYREL) 50 MG tablet Take 50 mg by mouth nightly   Yes Historical Provider, MD   pravastatin (PRAVACHOL) 80 MG tablet Take 80 mg by mouth nightly    Yes Historical Provider, MD   oxybutynin (DITROPAN-XL) 10 MG extended release tablet Take 10 mg by mouth daily   Yes Historical Provider, MD   potassium chloride (MICRO-K) 10 MEQ CR capsule Take 10 mEq by mouth daily. Yes Historical Provider, MD   valsartan (DIOVAN) 40 MG tablet Take 40 mg by mouth nightly Patient takes at night   Yes Historical Provider, MD   metFORMIN (GLUCOPHAGE) 500 MG tablet Take 500 mg by mouth daily (with breakfast).    Yes Historical Provider, MD   hydrochlorothiazide (MICROZIDE) 12.5 MG capsule Take 12.5 mg by mouth nightly    Yes Historical Provider, MD   amLODIPine (NORVASC) 10 MG tablet Take 10 mg by mouth nightly Patient takes at night   Yes Historical Provider, MD   fluticasone-vilanterol (BREO ELLIPTA) 100-25 MCG/INH AEPB inhaler Inhale 1 puff into the lungs daily    Historical Provider, MD   Tens Unit [address] [facility] by Does not apply route 8/4/20   Reyna Muñiz MD   triamcinolone (KENALOG) 0.025 % cream Apply to rash twice on back of neck twice daily 10/14/19   Keesha Harden MD   tretinoin (RETIN-A) 0.025 % cream Apply pea sized amount to face, chest and back nightly 10/14/19   Keesha Harden MD   bisacodyl (DULCOLAX) 5 MG EC tablet TAKE 4 TABS AT 10 AM THE DAY PRIOR TO COLONOSCOPY 9/27/19   Lam Lama MD   Elastic Bandages & Supports (KNEE BRACE ADJUSTABLE HINGED) MISC Use as directed 3/27/19   Reyna Muñiz MD   Elastic Bandages & Supports (KNEE BRACE) MISC 2 Units by Does not apply route daily 3/7/19 3/6/20  Alfredito Vail MD   fluticasone [facility]) 110 MCG/ACT inhaler Inhale 1 puff into the lungs 2 times daily 4/3/18 8/31/20  Napoleon Mckeon MD   albuterol sulfate  (90 Base) MCG/ACT inhaler Inhale 2 puffs into the lungs every 6 hours as needed for Wheezing 2/2/18   SINTIA Rascon - CNP       This is a 45 y.o. female who is pleasant, cooperative, alert and oriented x 3, in no acute distress. Morbidly obese. Heart: Distant heart sounds. Regular rate and rhythm without murmur, gallop, or rub. Lungs: Diminished in bilateral lower lobes. Normal respiratory effort and unlabored. No wheezes or rales bilaterally. Abdomen: Rotund. Soft, non-tender, and active bowel sounds. Pedal pulses: are palpable bilaterally. Moderate edema in bilateral lower extremities. Labs:  No results for input(s): HGB, HCT, WBC, MCV, PLT, NA, K, CL, CO2, BUN, CREATININE, GLUCOSE, INR, PROTIME, APTT, AST, ALT, LABALBU, HCG in the last 720 hours. No results for input(s): COVID19 in the last 720 hours. Lupis PATRICIO, ETIENNE-BC  Electronically signed 8/31/2020 at 2:02 Geovani Macario MD    Physician    Specialty:  Pain Management    Progress Notes       Signed    Encounter Date:  8/4/2020          Related encounter: Office Visit from 8/4/2020 in [facility] [place]          Signed        Expand All Collapse All     Show:Clear all  [x]Manual[x]Template[]Copied    Added by:  [x]Bethany Boland MD    []Sera for details   The patient is a 45 y. o. Non-/non  female.      Chief Complaint   Patient presents with    Knee Pain         HPI     50-year-old woman was seen in our clinic for chronic knee pain  Onset of symptom many years ago  Pain is located over both knees aggravated with standing and walking  Pain is constant  Patient is diagnosed with severe tricompartmental knee arthritis  She is not a surgical candidate because of morbid obesity  She has tried physical therapy NSAIDs and knee cortisone injection  She has been on Visco supplementation and find it very helpful  Last injection was 6 months ago  Patient is interested in scheduling her injection  She is currently using topical NSAIDs  Medication Refill: Voltaren gel     Pain score Today:  7  Adverse effects (Constipation / Nausea / Sedation / sexual Dysfunction / others) : none  Mood: good  Sleep pattern and quality: good  Activity level: fair     Pill count Today:no  Last dose taken: 2 months ago, taking prn  OARRS report reviewed today: yes  ER/Hospitalizations/PCP visit related to pain since last visit:no   Any legal problems e.g. DUI etc.:No  Satisfied with current management: Yes     Opioid Contract:meredith  Last Urine Dug screen dated none        Past Medical History, Past Surgical History, Social History, Allergies and Medications reviewed and updated in EPIC as indicated     Family History reviewed and is noncontributory.            Past Medical History        Past Medical History:   Diagnosis Date    Asthma       Mild intermittent asthma without complication    Astigmatism, regular 4/12/2017    Bronchitis 2009    Chronic back pain       on 2/18/20 pt is presently on pain mgmnt    Depression 5/1/2014    Diabetes mellitus ([address]       Type II    Frequency of urination      Hyperlipidemia      Hypertension      Increased frequency of urination 8/27/2018    Morbid obesity with body mass index (BMI) of 50.0 to 59.9 in adult ([address]      Myopia 4/12/2017    OAG (open angle glaucoma) suspect, low risk 5/4/2017    Palpitations       occasional    Schizophrenia ([address]      Sleep apnea since Oct. 2017     CPAP nightly    Urge incontinence of urine 8/27/2018    Wears glasses           Past Surgical History         Past Surgical History:   Procedure Laterality Date    DILATION AND CURETTAGE OF UTERUS N/A 11/14/2017     OPERATIVE HYSTEROSCOPY WITH MYOSURE, EXCISION ENDOMETRIAL POLYPS performed by Aisha Kayser, MD at [facility] NERV Bilateral 3/25/2019     BILATERAL KNEE FLORO GUIDED STEROID INJECTION performed by Lexus Colbert MD at [address] [place] [address] 37 NERV Bilateral 5/13/2019     SYNVISC  MEDICATION INJECTION   FLUORO GUIDED  FABIOLA KNEES performed by Lexus Colbert MD at [address] [place] [address] NERV Bilateral 5/20/2019     INJECTION SYNVISC  FLUORO GUIDED FABIOLA KNEES performed by Lexus Colbert MD at [address] [place] [address] NERV Bilateral 6/10/2019     INJECTION SYNVISC FLUORO GUIDED FABIOLA KNEES performed by Lexus Colbert MD at [address] [place] [address] 37 NERV Bilateral 11/18/2019     INJECTION SYNVISC BILATERAL KNEE performed by Lexus Colbert MD at [address] [place] [address]   11/14/2017     exc endometrial polyps    OTHER SURGICAL HISTORY Right 10/17/14     excision dorsal exostosis rt foot    OTHER SURGICAL HISTORY Bilateral 06/10/2019     INJECTION SYNVISC FLUORO GUIDED FABIOLA KNEES (Bilateral )        Social History   Social History            Socioeconomic History    Marital status: Single       Spouse name: None    Number of children: 0    Years of education: None    Highest education level: None   Occupational History    None   Social Needs    Financial resource strain: None    Food insecurity       Worry: None       Inability: None    Transportation needs       Medical: None       Non-medical: None   Tobacco Use    Smoking status: Never Smoker    Smokeless tobacco: Never Used    Tobacco comment: associates smoking   Substance and Sexual Activity    Alcohol use: No    Drug use: No    Sexual activity: Never   Lifestyle    Physical activity       Days per week: None       Minutes per session: None    Stress: None   Relationships    Social connections       Talks on phone: None       Gets together: None       Attends Anabaptism service: None       Active member of club or organization: None       Attends meetings of clubs or organizations: None       Relationship status: None    Intimate partner violence       Fear of current or ex partner: None       Emotionally abused: None       Physically abused: None       Forced sexual activity: None   Other Topics Concern    None   Social History Narrative    None        Family History         Family History   Problem Relation Age of Onset    Diabetes Mother      Heart Failure Mother      Diabetes Maternal Grandmother      Heart Failure Maternal Grandmother      No Known Problems Maternal Grandfather      No Known Problems Paternal Grandmother      No Known Problems Paternal Grandfather      Cancer Paternal Aunt                Allergies   Allergen Reactions    Lisinopril         Makes her cough     Lisinopril       Vitals:     08/04/20 0800   BP: 122/74   Pulse: 74   Temp: 97.6 °F (36.4 °C)   SpO2: 98%     Current Facility-Administered Medications          Current Outpatient Medications   Medication Sig Dispense Refill    meloxicam (MOBIC) 7.5 MG tablet Take 7.5 mg by mouth daily        lidocaine (LMX) 4 % cream Apply topically as needed        fluticasone-vilanterol (BREO ELLIPTA) 100-25 MCG/INH AEPB inhaler Inhale 1 puff into the lungs daily        diclofenac sodium (VOLTAREN) 1 % GEL Apply 4 g topically 4 times daily 4 Tube 3    Tens Unit MISC by Does not apply route 1 each 0    TRAVATAN Z 0.004 % SOLN ophthalmic solution PLACE 1 DROP IN BOTH EYES EACH NIGHT   1    triamcinolone (KENALOG) 0.025 % cream Apply to rash twice on back of neck twice daily 80 g 2    tretinoin (RETIN-A) 0.025 % cream Apply pea sized amount to face, chest and back nightly 45 g 3    bisacodyl (DULCOLAX) 5 MG EC tablet TAKE 4 TABS AT 10 AM THE DAY PRIOR TO COLONOSCOPY 4 tablet 0    gabapentin (NEURONTIN) 300 MG capsule Take 1 capsule by mouth. .        fluticasone (FLOVENT HFA) 110 MCG/ACT inhaler Inhale 1 puff into the lungs 2 times daily 1 Inhaler 11    albuterol sulfate  (90 Base) MCG/ACT inhaler Inhale 2 puffs into the lungs every 6 hours as needed for Wheezing 1 Inhaler 3    ibuprofen (ADVIL;MOTRIN) 800 MG tablet Take 1 tablet by mouth every 6 hours as needed for Pain 30 tablet 0    DICLOFENAC POTASSIUM PO Take 50 mg by mouth 3 times daily as needed        ARIPiprazole (ABILIFY) 15 MG tablet Take 15 mg by mouth nightly         traZODone (DESYREL) 50 MG tablet Take 50 mg by mouth nightly        pravastatin (PRAVACHOL) 80 MG tablet Take 80 mg by mouth nightly         oxybutynin (DITROPAN-XL) 10 MG extended release tablet Take 10 mg by mouth daily        potassium chloride (MICRO-K) 10 MEQ CR capsule Take 10 mEq by mouth daily.  valsartan (DIOVAN) 40 MG tablet Take 40 mg by mouth nightly Patient takes at night        metFORMIN (GLUCOPHAGE) 500 MG tablet Take 500 mg by mouth daily (with breakfast).  hydrochlorothiazide (MICROZIDE) 12.5 MG capsule Take 12.5 mg by mouth nightly         amLODIPine (NORVASC) 10 MG tablet Take 10 mg by mouth nightly Patient takes at night        Elastic Bandages & Supports (KNEE BRACE ADJUSTABLE HINGED) MISC Use as directed 1 each 0    Elastic Bandages & Supports (KNEE BRACE) MISC 2 Units by Does not apply route daily 2 each 0      No current facility-administered medications for this visit. Review of Systems   Constitutional: Negative. Negative for fever. Respiratory: Positive for cough and shortness of breath. Musculoskeletal: Positive for arthralgias, back pain, gait problem, joint swelling, myalgias and neck pain. Psychiatric/Behavioral: Negative. Objective:  General Appearance:  Well-appearing, in no acute distress, uncomfortable and in pain. Vital signs: (most recent): Blood pressure 122/74, pulse 74, temperature 97.6 °F (36.4 °C), temperature source Temporal, height 5' 5\" (1.651 m), SpO2 98 %. Vital signs are normal.  No fever.     Output: Producing urine and producing stool. HEENT: Normal HEENT exam.    Lungs:  Normal effort and normal respiratory rate. Breath sounds clear to auscultation. She is not in respiratory distress. Heart: Normal rate. Extremities: Normal range of motion. There is no deformity. Neurological: Patient is alert and oriented to person, place and time. Patient has normal coordination. Pupils:  Pupils are equal, round, and reactive to light. Pupils are equal.   Skin:  Warm and dry. No rash or cyanosis. Assessment & Plan  1. Primary osteoarthritis of both knees    2. Morbid obesity with BMI of 60.0-69.9, adult ([facility] [address]    3.  Medication management            Orders Placed This Encounter   Procedures    IR ARTHR/ASP/INJ MAJOR JT/BURSA W US      Encounter Medications         Orders Placed This Encounter   Medications    diclofenac sodium (VOLTAREN) 1 % GEL       Sig: Apply 4 g topically 4 times daily       Dispense:  4 Tube       Refill:  3    Tens Unit MISC       Sig: by Does not apply route       Dispense:  1 each       Refill:  0               Electronically signed by Sadie Rodriguez MD on 8/4/2020 at 9:04 AM            Revision History

## 2020-08-31 NOTE — OP NOTE
Operative Note      Patient: Larry Flores  YOB: 1970  MRN: [id number]    Date of Procedure: 8/31/2020    Pre-Op Diagnosis: DX OA FABIOLA KNEES    Post-Op Diagnosis: Same       Procedure(s):  3RD U/S GUIDED KNEE SYNVISC INJECTION    Surgeon(s):  Jaydon Pillai MD    Assistant:   * No surgical staff found *    Anesthesia: Local    Estimated Blood Loss (mL): Minimal    Complications: None    Specimens:   * No specimens in log *    Implants:  * No implants in log *      Drains: * No LDAs found *    Findings: n/a    Detailed Description of Procedure:       Preoperative Diagnosis: Osteoarthritis of the Bilateral knee. Postoperative Diagnosis: Osteoarthritis of the Bilateral knee. Procedure Performed:  Injection of Bilateral knee with US Guidance. # 3    Indication for the Procedure: The patient has Bilateral knee pain not responding to conservative treatment diagnosed with Knee OA  The procedure and the risks were discussed with the patient and an informed consent was obtained. Procedure: The patient is placed in supine/sitting position. Skin over the Bilateral knee was prepped with Betadine and draped in sterile manner. Using [address] with a high frequency linear probe area was scanned to identify the supra patellar recess. Then skin and deep tissues lateral to the patellar tendon just above the tibial plateau were infiltrated with 2  ml of 1% lidocaine. The #22-gauge  spinal needle was introduced through the skin wheal. Then the needle was advanced with US guidance into the supra patellar recess. Finally 2 ml of treatment solution containing SYNVISC were injected into the joint. The needle is removed and a Band-Aid was placed over the needle insertion site. The procedure was first performed on the right side and was then repeated on the left side with same technique  Patient tolerated the procedure well and the vital signs remained stable.  Patient will be seen in the pain clinic for follow up and further planning.     Electronically signed by Saulo Farr MD on 8/31/2020 at 3:18 PM

## 2020-09-21 ENCOUNTER — HOSPITAL ENCOUNTER (OUTPATIENT)
Age: 50
Discharge: HOME | End: 2020-09-21
Payer: COMMERCIAL

## 2020-09-21 LAB — TSH SERPL DL<=0.05 MIU/L-ACNC: 1.28 MIU/L (ref 0.3–5)

## 2020-09-21 PROCEDURE — 36415 COLL VENOUS BLD VENIPUNCTURE: CPT

## 2020-09-21 PROCEDURE — 84443 ASSAY THYROID STIM HORMONE: CPT

## 2020-10-15 ENCOUNTER — TELEPHONE (OUTPATIENT)
Dept: PAIN MANAGEMENT | Age: 50
End: 2020-10-15

## 2020-10-21 ENCOUNTER — TELEPHONE (OUTPATIENT)
Dept: PAIN MANAGEMENT | Age: 50
End: 2020-10-21

## 2020-10-21 NOTE — TELEPHONE ENCOUNTER
Patient is currently a patient w Dr. Luciano Gupta. And wants to switch to this clinic.  Patient was made aware there are no guarantees of Dr. Dl Cope prescribing narcotics to this patient   scheduled 11/17/2020 140pm

## 2020-10-30 ENCOUNTER — VIRTUAL VISIT (OUTPATIENT)
Dept: PULMONOLOGY | Age: 50
End: 2020-10-30
Payer: COMMERCIAL

## 2020-10-30 PROCEDURE — 3017F COLORECTAL CA SCREEN DOC REV: CPT | Performed by: INTERNAL MEDICINE

## 2020-10-30 PROCEDURE — G8417 CALC BMI ABV UP PARAM F/U: HCPCS | Performed by: INTERNAL MEDICINE

## 2020-10-30 PROCEDURE — G8427 DOCREV CUR MEDS BY ELIG CLIN: HCPCS | Performed by: INTERNAL MEDICINE

## 2020-10-30 PROCEDURE — G8484 FLU IMMUNIZE NO ADMIN: HCPCS | Performed by: INTERNAL MEDICINE

## 2020-10-30 PROCEDURE — 99214 OFFICE O/P EST MOD 30 MIN: CPT | Performed by: INTERNAL MEDICINE

## 2020-10-30 PROCEDURE — 1036F TOBACCO NON-USER: CPT | Performed by: INTERNAL MEDICINE

## 2020-10-30 ASSESSMENT — SLEEP AND FATIGUE QUESTIONNAIRES
HOW LIKELY ARE YOU TO NOD OFF OR FALL ASLEEP WHILE SITTING QUIETLY AFTER LUNCH WITHOUT ALCOHOL: 0
HOW LIKELY ARE YOU TO NOD OFF OR FALL ASLEEP WHILE WATCHING TV: 1
ESS TOTAL SCORE: 2
HOW LIKELY ARE YOU TO NOD OFF OR FALL ASLEEP IN A CAR, WHILE STOPPED FOR A FEW MINUTES IN TRAFFIC: 0
HOW LIKELY ARE YOU TO NOD OFF OR FALL ASLEEP WHILE SITTING INACTIVE IN A PUBLIC PLACE: 0
HOW LIKELY ARE YOU TO NOD OFF OR FALL ASLEEP WHILE SITTING AND TALKING TO SOMEONE: 0
HOW LIKELY ARE YOU TO NOD OFF OR FALL ASLEEP WHEN YOU ARE A PASSENGER IN A CAR FOR AN HOUR WITHOUT A BREAK: 0
HOW LIKELY ARE YOU TO NOD OFF OR FALL ASLEEP WHILE LYING DOWN TO REST IN THE AFTERNOON WHEN CIRCUMSTANCES PERMIT: 0
HOW LIKELY ARE YOU TO NOD OFF OR FALL ASLEEP WHILE SITTING AND READING: 1

## 2020-10-30 NOTE — PROGRESS NOTES
OUTPATIENT PULMONARY PROGRESS NOTE    Telehealth visit  Telephone visit    Patient:  Mary Barry  MRN: [id number]    Consulting Physician: Dr Shanthi Diaz  Reason for Consult: Obstructive Sleep Apnea  Primacy Care Physician: Claude Knowles MD    HISTORY OF PRESENT ILLNESS:   The patient is a 45 y.o. female   Follow-up of mild asthma/allergic rhinitis/severe obstructive sleep apnea. According to patient her asthma has been stable and since she got rid of the cats she had not been using Qvar. She was on Qvar in the past and when she was seen last time a year ago she had to stop using Qvar. She has not had any asthma exacerbation ER visit since last year. She denies daily or persistent cough and denies sputum production. Denies wheezing chest tightness and shortness of breath. She had only dyspnea on strenuous activity and exertion but she is not active because of her knee issues. According to her her knee problems are getting worse she have a home health at home because of her limited mobility and she does not go outside much because of her knee problems. She denies shortness of breath during her regular activities able to do regular activities at home. She denies nocturnal awakening with cough wheezing chest tightness shortness of breath. Denies orthopnea PND or worsening pedal edema  She denies significant postnasal drip nasal congestion and she is not using Flonase except as needed. She had not use albuterol for some time now as she does not need it. She has not been compliant with her CPAP she had to stop using CPAP. She is supposed to be on CPAP of 9 cm compliance data is obtained and there is no data from CPAP as she is not using it    According to patient she is not get adjusted to the CPAP different masks were tried, she is not able to keep anything on her face nasal pillows was tried also and she had not tolerated CPAP.   According to her sometimes she gets tired and fatigued during the daytime which she attributed to her psychiatric medications and she has been followed by psychiatry. She usually does not take nap during the daytime except when she is tired and occasional nap. She does not doze off watching TV or reading in sitting or talking to someone most of the time. Last pulmonary function test last year showed normal spirometry and no obstruction and unable to do DLCO and lung volumes. Sleep  Denies dry mouth upon awakening. Denies fatigue and tiredness during the day. Goes to sleep at 11 pm, wakes up 6-8 am. It takes  30 minutes to fall asleep. Wakes up many times at night to go to bathroom. Takes no or very occasional nap during the day ( 30 to 60 minutes). No headache in am. She does not drive. Positive weight gain. No forgetfulness or decreased concentration. No nasal congestion or obstruction at night. No significant caffienated drinks or alcohol. No leg jerks during sleep. No restless feelings in legs at night. No numbness or burning in leg or feet. No leg aches or cramps . No loss of muscle strength when angry or laugh. No hallucination when dozing off or waking up from sleep. No paralysis upon awakening from sleep or going to sleep. No teeth grinding, nightmares, sleep walking. No night time panic attacks. Previous history and clinical course  She was seen initially in 2017 and she was supposed to be on auto CPAP 10-20. She was started on auto CPAP but she was not compliant with CPAP. She had a re-titration study done and her CPAP was changed to 9 cm, she remained noncompliant with CPAP. She has a history of asthma which was mild persistent/intermittent asthma she claimed that her asthma was under control on medication when she was taking inhaled corticosteroids.   Her allergic rhinitis symptoms are also under control and she use Flonase as needed      Sleep Medicine 10/30/2020 10/30/2019 3/13/2019 8/28/2018 5/11/2018 5/11/2018 3/28/2018   Sitting and reading 1 2 3 1 - - 0 Watching TV 1 3 3 1 - - 0   Sitting, inactive in a public place (e.g. a theatre or a meeting) 0 1 1 0 - - 0   As a passenger in a car for an hour without a break 0 0 0 0 - - 0   Lying down to rest in the afternoon when circumstances permit 0 3 2 1 - - 2   Sitting and talking to someone 0 3 0 0 - - 0   Sitting quietly after a lunch without alcohol 0 2 1 0 - - 0   In a car, while stopped for a few minutes in traffic 0 0 0 0 - - 0   Total score 2 14 10 3 - - 2   Neck circumference - - - - 47 46 0           Past Medical History:        Diagnosis Date    Asthma     Mild intermittent asthma without complication    Astigmatism, regular 4/12/2017    Bronchitis 2009    Chronic back pain     on 2/18/20 pt is presently on pain mgmnt    Depression 5/1/2014    Diabetes mellitus ([address]     Type II    Frequency of urination     Hyperlipidemia     Hypertension     Increased frequency of urination 8/27/2018    Morbid obesity with body mass index (BMI) of 50.0 to 59.9 in adult ([address]     Myopia 4/12/2017    OAG (open angle glaucoma) suspect, low risk 5/4/2017    Palpitations     occasional    Schizophrenia ([address]     Sleep apnea since Oct. 2017    CPAP nightly    Urge incontinence of urine 8/27/2018    Wears glasses        Past Surgical History:        Procedure Laterality Date    DILATION AND CURETTAGE OF UTERUS N/A 11/14/2017    OPERATIVE HYSTEROSCOPY WITH MYOSURE, EXCISION ENDOMETRIAL POLYPS performed by Eze Mullen MD at [facility] [address] NERV Bilateral 3/25/2019    BILATERAL KNEE FLORO GUIDED STEROID INJECTION performed by Davi Chavez MD at [address] [place] [address] NERV Bilateral 5/13/2019    SYNVISC  MEDICATION INJECTION   FLUORO GUIDED  FABIOLA KNEES performed by Davi Chavez MD at [address] [place] [address] NERV Bilateral 5/20/2019    INJECTION SYNVISC  FLUORO GUIDED FABIOLA KNEES performed by Davi Chavez MD at [address] [place] [address] NERV Bilateral 6/10/2019    INJECTION SYNVISC FLUORO GUIDED FABIOLA KNEES performed by Sparkle Rizvi MD at [address] [facility] NERV Bilateral 11/18/2019    INJECTION SYNVISC BILATERAL KNEE performed by Sparkle Riziv MD at [address] [facility] HYSTEROSCOPY  11/14/2017    exc endometrial polyps    MEDICATION INJECTION N/A 8/17/2020    1ST -U/S GUIDED KNEE SYNVISC INJECTION performed by Sparkle Rizvi MD at [address] [address] 8/24/2020    2ND -U/S GUIDED KNEE SYNVISC INJECTION performed by Sparkle Rizvi MD at [address] [facility] 8/31/2020    3RD U/S GUIDED KNEE SYNVISC INJECTION performed by Sparkle Rizvi MD at [address] 10/17/14    excision dorsal exostosis rt foot    OTHER SURGICAL HISTORY Bilateral 06/10/2019    INJECTION SYNVISC FLUORO GUIDED FABIOLA KNEES (Bilateral )       Allergies: Allergies   Allergen Reactions    Lisinopril      Makes her cough         Home Meds:   Outpatient Encounter Medications as of 10/30/2020   Medication Sig Dispense Refill    meloxicam (MOBIC) 7.5 MG tablet Take 7.5 mg by mouth daily      lidocaine (LMX) 4 % cream Apply topically as needed      fluticasone-vilanterol (BREO ELLIPTA) 100-25 MCG/INH AEPB inhaler Inhale 1 puff into the lungs daily      Tens Unit MISC by Does not apply route 1 each 0    TRAVATAN Z 0.004 % SOLN ophthalmic solution PLACE 1 DROP IN BOTH EYES EACH NIGHT  1    triamcinolone (KENALOG) 0.025 % cream Apply to rash twice on back of neck twice daily 80 g 2    tretinoin (RETIN-A) 0.025 % cream Apply pea sized amount to face, chest and back nightly 45 g 3    bisacodyl (DULCOLAX) 5 MG EC tablet TAKE 4 TABS AT 10 AM THE DAY PRIOR TO COLONOSCOPY 4 tablet 0    Elastic Bandages & Supports (KNEE BRACE ADJUSTABLE HINGED) MISC Use as directed 1 each 0    gabapentin (NEURONTIN) 300 MG capsule Take 1 capsule by mouth. .      albuterol sulfate  (90 Base) MCG/ACT inhaler Inhale 2 puffs into the lungs every 6 hours as needed for Wheezing 1 Inhaler 3    ibuprofen (ADVIL;MOTRIN) 800 MG tablet Take 1 tablet by mouth every 6 hours as needed for Pain 30 tablet 0    DICLOFENAC POTASSIUM PO Take 50 mg by mouth 3 times daily as needed      ARIPiprazole (ABILIFY) 15 MG tablet Take 15 mg by mouth nightly       traZODone (DESYREL) 50 MG tablet Take 50 mg by mouth nightly      pravastatin (PRAVACHOL) 80 MG tablet Take 80 mg by mouth nightly       oxybutynin (DITROPAN-XL) 10 MG extended release tablet Take 10 mg by mouth daily      potassium chloride (MICRO-K) 10 MEQ CR capsule Take 10 mEq by mouth daily.  valsartan (DIOVAN) 40 MG tablet Take 40 mg by mouth nightly Patient takes at night      metFORMIN (GLUCOPHAGE) 500 MG tablet Take 500 mg by mouth daily (with breakfast).  hydrochlorothiazide (MICROZIDE) 12.5 MG capsule Take 12.5 mg by mouth nightly       amLODIPine (NORVASC) 10 MG tablet Take 10 mg by mouth nightly Patient takes at night      diclofenac sodium (VOLTAREN) 1 % GEL Apply 4 g topically 4 times daily 4 Tube 3    Elastic Bandages & Supports (KNEE BRACE) MISC 2 Units by Does not apply route daily 2 each 0    fluticasone (FLOVENT HFA) 110 MCG/ACT inhaler Inhale 1 puff into the lungs 2 times daily 1 Inhaler 11     No facility-administered encounter medications on file as of 10/30/2020. Social History:   TOBACCO:   reports that she has never smoked. She has never used smokeless tobacco.  ETOH:   reports no history of alcohol use.   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    Family History:       Problem Relation Age of Onset    Diabetes Mother     Heart Failure Mother     Diabetes Maternal Grandmother     Heart Failure Maternal Grandmother     No Known Problems Maternal Grandfather     No Known Problems Paternal Grandmother     No Known Problems Paternal Grandfather     Cancer Paternal Aunt        Immunizations:    Immunization History   Administered Date(s) Administered    Influenza Vaccine, unspecified formulation 10/26/2017, 09/27/2018         REVIEW OF SYSTEMS:    General ROS: positive for  -mild occasional fatigue and negative for weight gain, fever chills  ENT ROS: negative for - nasal congestion and nasal discharge  negative for - sneezing, sore throat, tinnitus, vertigo, visual changes or vocal changes  Allergy and Immunology ROS: negative for - nasal congestion, postnasal drip and seasonal allergies  Respiratory ROS: negative for - cough, shortness of breath wheezing hemoptysis chest pain  negative for - pleuritic pain, sputum changes, stridor or tachypnea  Cardiovascular ROS: positive for -mild dyspnea on exertion and chronic edema, negative for - chest pain, irregular heartbeat, loss of consciousness, murmur, orthopnea, palpitations, paroxysmal nocturnal dyspnea or rapid heart rate  Gastrointestinal ROS: no abdominal pain, change in bowel habits, or black or bloody stools  Musculoskeletal ROS: positive for - joint pain and joint stiffness  Positive snoring, choking, excessive daytime sleepiness, falling asleep while watching television, disrupted sleep, naps      Physical Exam:    Vitals: LMP  (LMP Unknown) Comment: last period was before March 2018  Last 3 weights: Wt Readings from Last 3 Encounters:   08/31/20 (!) 365 lb (165.6 kg)   08/24/20 (!) 365 lb (165.6 kg)   08/17/20 (!) 365 lb (165.6 kg)     There is no height or weight on file to calculate BMI. Physical Examination:   Physical examination not done as this is a telephone visit. No conversational dyspnea noted on the phone.         LABS:    CBC:   WBC   Date Value Ref Range Status   12/04/2019 7.9 3.5 - 11.3 k/uL Final   07/01/2019 5.6 3.5 - 11.3 k/uL Final   01/15/2018 7.4 10^3/mL Final     Hemoglobin   Date Value Ref Range Status   12/04/2019 12.2 11.9 - 15.1 g/dL Final   07/01/2019 12.4 11.9 - 15.1 g/dL Final   01/15/2018 11.7 (A) 12.0 - 16.0 g/dL Final     Platelets   Date Value Ref Range Status   12/04/2019 248 138 - 453 k/uL Final   07/01/2019 282 138 - 453 k/uL Final   01/15/2018 279 K/µL Final     BMP:   Sodium   Date Value Ref Range Status   07/09/2020 140 135 - 144 mmol/L Final   12/04/2019 143 135 - 144 mmol/L Final   07/01/2019 143 135 - 144 mmol/L Final     Potassium   Date Value Ref Range Status   07/09/2020 3.6 (L) 3.7 - 5.3 mmol/L Final   12/04/2019 4.0 3.7 - 5.3 mmol/L Final   07/01/2019 3.8 3.7 - 5.3 mmol/L Final     Chloride   Date Value Ref Range Status   07/09/2020 102 98 - 107 mmol/L Final   12/04/2019 104 98 - 107 mmol/L Final   07/01/2019 105 98 - 107 mmol/L Final     CO2   Date Value Ref Range Status   07/09/2020 26 20 - 31 mmol/L Final   12/04/2019 26 20 - 31 mmol/L Final   07/01/2019 26 20 - 31 mmol/L Final     BUN   Date Value Ref Range Status   07/09/2020 13 6 - 20 mg/dL Final   12/04/2019 16 6 - 20 mg/dL Final   07/01/2019 18 6 - 20 mg/dL Final     CREATININE   Date Value Ref Range Status   07/09/2020 0.81 0.50 - 0.90 mg/dL Final   12/04/2019 0.81 0.50 - 0.90 mg/dL Final   07/01/2019 0.78 0.50 - 0.90 mg/dL Final     Glucose   Date Value Ref Range Status   07/09/2020 94 70 - 99 mg/dL Final   12/04/2019 93 70 - 99 mg/dL Final   07/01/2019 81 70 - 99 mg/dL Final     Hepatic:   AST   Date Value Ref Range Status   12/04/2019 16 <32 U/L Final   07/01/2019 13 <32 U/L Final   01/15/2018 13 U/L Final     ALT   Date Value Ref Range Status   12/04/2019 18 5 - 33 U/L Final   07/01/2019 16 5 - 33 U/L Final   01/15/2018 14 U/L Final     Total Bilirubin   Date Value Ref Range Status   12/04/2019 0.19 (L) 0.3 - 1.2 mg/dL Final   07/01/2019 0.20 (L) 0.3 - 1.2 mg/dL Final   04/04/2018 <0.10 (L) 0.3 - 1.2 mg/dL Final     Alkaline Phosphatase   Date Value Ref Range Status   12/04/2019 90 35 - 104 U/L Final   07/01/2019 84 35 - 104 U/L Final   01/15/2018 49 U/L Final     Amylase: No results found for: AMYLASE  Lipase: No results found for: LIPASE  CARDIAC ENZYMES: No results found for: CKTOTAL, she restart using CPAP  Wt loss is recommended and discussed  Follow good sleep hygeine instructions  Given sleep hygeine instructions    Will continue to watch off Qvar. Avoid allergens and triggers  Albuterol as needed  Flonase nasal spray as needed  Vaccinations recommended and she she get her vaccination from PCP/pharmacy. Had Pneumonia vaccine last year  Annual flu vaccine in fall she get it from pharmacy or PCP  Maintain an active lifestyle     Questions answered to pt's satisfaction   RTC 1 year    It was my pleasure to evaluate Karthik Quigley today. Please call with questions. Orlin Cordon MD             10/30/2020, 4:48 PM    Please note that this chart was generated using voice recognition Dragon dictation software. Although every effort was made to ensure the accuracy of this automated transcription, some errors in transcription may have occurred. Karthik Quigley is a 45 y.o. female being evaluated by a Virtual Visit (video/telephone visit) encounter to address concerns as mentioned above. A caregiver was present when appropriate. Due to this being a TeleHealth encounter (During [place] [id number] public health emergency), evaluation of the following organ systems was limited: Vitals/Constitutional/EENT/Resp/CV/GI//MS/Neuro/Skin/Heme-Lymph-Imm. Pursuant to the emergency declaration under the [facility] [address] authority and the [employer] Act, this Virtual Visit was conducted with patient's (and/or legal guardian's) consent, to reduce the patient's risk of exposure to COVID-19 and provide necessary medical care. The patient (and/or legal guardian) has also been advised to contact this office for worsening conditions or problems, and seek emergency medical treatment and/or call 911 if deemed necessary.   Patient identification was verified at the start of the visit: Yes  Total time spent for this encounter: 23 minutes  Services were provided through a video/telephone synchronous discussion virtually to substitute for in-person clinic visit. Patient and provider were located at their individual locations at home and office respectively. --Micheal Campbell MD on 10/30/2020 at 5:29 PM    An electronic signature was used to authenticate this note.

## 2020-11-10 ENCOUNTER — TELEPHONE (OUTPATIENT)
Dept: PAIN MANAGEMENT | Age: 50
End: 2020-11-10

## 2020-11-10 NOTE — TELEPHONE ENCOUNTER
Returned pt phone call.  Pt states she does not have device to do VV but states she is transferring care to Dr. Benja Clay and has appointment on 11/17/20

## 2020-11-10 NOTE — TELEPHONE ENCOUNTER
Patient is requesting an over the phone appointment due to her leg pain. She stated that a phone appointment will be better for her and refused office visit. Please contact the patient at [phone] to advise. Thank you.

## 2020-11-12 ENCOUNTER — TELEPHONE (OUTPATIENT)
Dept: PAIN MANAGEMENT | Age: 50
End: 2020-11-12

## 2020-11-16 VITALS — BODY MASS INDEX: 48.82 KG/M2 | HEIGHT: 65 IN | WEIGHT: 293 LBS

## 2020-11-16 ASSESSMENT — PAIN DESCRIPTION - FREQUENCY: FREQUENCY: CONTINUOUS

## 2020-11-16 ASSESSMENT — PAIN SCALES - GENERAL: PAINLEVEL_OUTOF10: 9

## 2020-11-16 ASSESSMENT — PAIN - FUNCTIONAL ASSESSMENT: PAIN_FUNCTIONAL_ASSESSMENT: PREVENTS OR INTERFERES SOME ACTIVE ACTIVITIES AND ADLS

## 2020-11-16 ASSESSMENT — ENCOUNTER SYMPTOMS
GASTROINTESTINAL NEGATIVE: 1
WHEEZING: 1
SHORTNESS OF BREATH: 1
ALLERGIC/IMMUNOLOGIC NEGATIVE: 1
EYES NEGATIVE: 1

## 2020-11-16 ASSESSMENT — PAIN DESCRIPTION - ONSET: ONSET: ON-GOING

## 2020-11-16 ASSESSMENT — PAIN DESCRIPTION - DESCRIPTORS: DESCRIPTORS: ACHING;CONSTANT;DULL;JABBING;NAGGING;SHARP

## 2020-11-16 ASSESSMENT — PAIN DESCRIPTION - PROGRESSION: CLINICAL_PROGRESSION: GRADUALLY WORSENING

## 2020-11-16 ASSESSMENT — PAIN DESCRIPTION - LOCATION: LOCATION: KNEE

## 2020-11-16 ASSESSMENT — PAIN DESCRIPTION - ORIENTATION: ORIENTATION: RIGHT;LEFT

## 2020-11-16 NOTE — PROGRESS NOTES
[address] Pain Management  Patient Pain Assessment  Consultation - Dr. Priscila Gonzalez    Primary Care Physician: Pro Albert MD    Chief complaint:   Chief Complaint   Patient presents with    Knee Pain   . Karthik Quigley is a 45 y.o. female evaluated on 11/17/2020. Patient is referred by Valerie Beasley MD   Modality of virtual service provided -via  telephone   Consent:  Patient and/or health care decision maker is aware that that patient may receive a bill for this telephone service, depending on one's insurance coverage, and has provided verbal consent to proceed: Yes    Patient identification was verified at the start of the visit: Yes    Chief complaint: Karthik Quigley is 45 y.o.,  female, with  Knee Pain  . HISTORY OF PRESENT ILLNESS:  Karthik Quigley is 45 y.o. female with    Referring Diagnosis   M25.519 (ICD-10-CM) - Pain in unspecified shoulder     Patient is a 79-year-old female referred to the pain clinic  in consultation for bilateral knee pain. According to the referring diagnosis it was shoulder pain but when I talked with the patient patient reports she has bilateral knee pain of longstanding duration and would like to be seen for it. Patient has pain in the knees since 2018. She was seen by Dr. Fina Sommer in the past and had some injections. Patient reports her injections are not helping her pain and hence she stopped going to him. She was referred to this pain clinic by her primary care physician. Patient reports she was taking Tylenol 3 2 pills a day from her primary care physician in the past which was helping the pain and she was more functional.  At this time her pain is severe and at times she cannot move walk and she had to call paramedics to move her. Patient also has pain in the low back. Walking helps her pain initially but gets worse after she walks a little while. Ports at times her legs locks up when she cannot move.   She uses a walker or [employer] SINCE LAST VISIT:  Have you ever been treated in another Pain Clinic yes  Refills for prescriptions appropriate: not applicable  Lost rx/pills: not applicable  Taking more medication than prescribed:  not applicable  Are you receiving PAIN medications from  other doctors: not applicable  Last Urine/Serum Drug Screen :  Was Serum/UDS as anticipated?  no  Brought pill bottles in :not applicable   Was Pill count appropriate? :not applicable   Are currently pregnant? not applicable  Recent ER visits: No             Past Medical History      Diagnosis Date    Asthma     Mild intermittent asthma without complication    Astigmatism, regular 4/12/2017    Bronchitis 2009    Chronic back pain     on 2/18/20 pt is presently on pain mgmnt    Depression 5/1/2014    Diabetes mellitus ([facility]     Type II    Frequency of urination     Hyperlipidemia     Hypertension     Increased frequency of urination 8/27/2018    Morbid obesity with body mass index (BMI) of 50.0 to 59.9 in adult ([address]     Myopia 4/12/2017    OAG (open angle glaucoma) suspect, low risk 5/4/2017    Palpitations     occasional    Schizophrenia ([facility] [address] [facility]     Sleep apnea since Oct. 2017    CPAP nightly    Urge incontinence of urine 8/27/2018    Wears glasses        Surgical History  Past Surgical History:   Procedure Laterality Date    DILATION AND CURETTAGE OF UTERUS N/A 11/14/2017    OPERATIVE HYSTEROSCOPY WITH MYOSURE, EXCISION ENDOMETRIAL POLYPS performed by Mandeep Walton MD at [address] NERV Bilateral 3/25/2019    BILATERAL KNEE FLORO GUIDED STEROID INJECTION performed by Clifford Jean MD at [address] [place] [address] NERV Bilateral 5/13/2019    SYNVISC  MEDICATION INJECTION   FLUORO GUIDED  FABIOLA KNEES performed by Clifford Jean MD at [address] [place] [address] NERV Bilateral 5/20/2019    INJECTION SYNVISC  FLUORO GUIDED FABIOLA KNEES performed by Clifford Jean MD at [facility]    HC INJECT OTHER PERPHRL NERV Bilateral 6/10/2019    INJECTION SYNVISC FLUORO GUIDED FABIOLA KNEES performed by Miguel Angel Weeks MD at [address] NERV Bilateral 11/18/2019    INJECTION SYNVISC BILATERAL KNEE performed by Miguel Angel Weeks MD at [address] [facility]  11/14/2017    exc endometrial polyps    MEDICATION INJECTION N/A 8/17/2020    1ST -U/S GUIDED KNEE SYNVISC INJECTION performed by Miguel Angel Weeks MD at [address] [place] [address] 8/24/2020    2ND -U/S GUIDED KNEE SYNVISC INJECTION performed by Miguel Angel Weeks MD at [address] [place] [address] 8/31/2020    3RD U/S GUIDED KNEE SYNVISC INJECTION performed by Miguel Angel Weeks MD at [facility] [address] Right 10/17/14    excision dorsal exostosis rt foot    OTHER SURGICAL HISTORY Bilateral 06/10/2019    INJECTION SYNVISC FLUORO GUIDED FABIOLA KNEES (Bilateral )       Medications  Current Outpatient Medications   Medication Sig Dispense Refill    acetaminophen-codeine (TYLENOL/CODEINE #3) 300-30 MG per tablet Take 1 tablet by mouth 2 times daily as needed for Pain for up to 15 days. Intended supply: 3 days.  Take lowest dose possible to manage pain 30 tablet 0    meloxicam (MOBIC) 7.5 MG tablet Take 7.5 mg by mouth daily      lidocaine (LMX) 4 % cream Apply topically as needed      fluticasone-vilanterol (BREO ELLIPTA) 100-25 MCG/INH AEPB inhaler Inhale 1 puff into the lungs daily      diclofenac sodium (VOLTAREN) 1 % GEL Apply 4 g topically 4 times daily 4 Tube 3    Tens Unit MISC by Does not apply route 1 each 0    TRAVATAN Z 0.004 % SOLN ophthalmic solution PLACE 1 DROP IN BOTH EYES EACH NIGHT  1    triamcinolone (KENALOG) 0.025 % cream Apply to rash twice on back of neck twice daily 80 g 2    tretinoin (RETIN-A) 0.025 % cream Apply pea sized amount to face, chest and back nightly 45 g 3    bisacodyl (DULCOLAX) 5 MG EC tablet TAKE 4 TABS AT 10 AM THE DAY PRIOR TO COLONOSCOPY 4 tablet 0    Elastic Bandages & Supports (KNEE BRACE ADJUSTABLE HINGED) MISC Use as directed 1 each 0    Elastic Bandages & Supports (KNEE BRACE) MISC 2 Units by Does not apply route daily 2 each 0    gabapentin (NEURONTIN) 300 MG capsule Take 1 capsule by mouth. .      fluticasone (FLOVENT HFA) 110 MCG/ACT inhaler Inhale 1 puff into the lungs 2 times daily 1 Inhaler 11    albuterol sulfate  (90 Base) MCG/ACT inhaler Inhale 2 puffs into the lungs every 6 hours as needed for Wheezing 1 Inhaler 3    ibuprofen (ADVIL;MOTRIN) 800 MG tablet Take 1 tablet by mouth every 6 hours as needed for Pain 30 tablet 0    DICLOFENAC POTASSIUM PO Take 50 mg by mouth 3 times daily as needed      ARIPiprazole (ABILIFY) 15 MG tablet Take 15 mg by mouth nightly       traZODone (DESYREL) 50 MG tablet Take 50 mg by mouth nightly      pravastatin (PRAVACHOL) 80 MG tablet Take 80 mg by mouth nightly       oxybutynin (DITROPAN-XL) 10 MG extended release tablet Take 10 mg by mouth daily      potassium chloride (MICRO-K) 10 MEQ CR capsule Take 10 mEq by mouth daily.  valsartan (DIOVAN) 40 MG tablet Take 40 mg by mouth nightly Patient takes at night      metFORMIN (GLUCOPHAGE) 500 MG tablet Take 500 mg by mouth daily (with breakfast).  hydrochlorothiazide (MICROZIDE) 12.5 MG capsule Take 12.5 mg by mouth nightly       amLODIPine (NORVASC) 10 MG tablet Take 10 mg by mouth nightly Patient takes at night       No current facility-administered medications for this encounter. Allergies  Lisinopril    Family History  family history includes Cancer in her paternal aunt; Diabetes in her maternal grandmother and mother; Heart Failure in her maternal grandmother and mother; No Known Problems in her maternal grandfather, paternal grandfather, and paternal grandmother.     Social History  Social History     Socioeconomic History    Marital status: Single     Spouse name: None    Number of children: 0    Years of education: None    Highest education level: None   Occupational History    None   Social Needs    Financial resource strain: None    Food insecurity     Worry: None     Inability: None    Transportation needs     Medical: None     Non-medical: None   Tobacco Use    Smoking status: Never Smoker    Smokeless tobacco: Never Used    Tobacco comment: associates smoking   Substance and Sexual Activity    Alcohol use: No    Drug use: No    Sexual activity: Never   Lifestyle    Physical activity     Days per week: None     Minutes per session: None    Stress: None   Relationships    Social connections     Talks on phone: None     Gets together: None     Attends Anabaptism service: None     Active member of club or organization: None     Attends meetings of clubs or organizations: None     Relationship status: None    Intimate partner violence     Fear of current or ex partner: None     Emotionally abused: None     Physically abused: None     Forced sexual activity: None   Other Topics Concern    None   Social History Narrative    None      reports no history of drug use. REVIEW OF SYSTEMS:      Review of Systems   Constitutional: Negative. Negative for activity change, appetite change, fatigue and unexpected weight change. HENT: Negative. Negative for congestion, hearing loss and rhinorrhea. Eyes: Negative. Negative for pain and visual disturbance. Respiratory: Positive for shortness of breath and wheezing. Negative for cough. Cardiovascular: Negative. Negative for palpitations and leg swelling. Gastrointestinal: Negative. Negative for diarrhea, nausea, rectal pain and vomiting. Endocrine: Negative. Negative for cold intolerance and polyuria. H/ diabetes mellitus   Genitourinary: Negative. Negative for dysuria and genital sores. Musculoskeletal: Positive for arthralgias and joint swelling. Skin: Negative. Negative for rash. Allergic/Immunologic: Negative.   Negative for immunocompromised state.   Neurological: Positive for tingling. Negative for seizures and weakness. Hematological: Does not bruise/bleed easily. Psychiatric/Behavioral: Negative for hallucinations, self-injury, sleep disturbance and suicidal ideas. The patient is nervous/anxious. The patient is not hyperactive. GENERAL PHYSICAL EXAM:  Vitals: Ht 5' 5\" (1.651 m)   Wt (!) 370 lb (167.8 kg)   LMP  (LMP Unknown) Comment: last period was before March 2018  BMI 61.57 kg/m² , Body mass index is 61.57 kg/m². Physical Exam  Constitutional:       Appearance: She is obese. Skin:         Neurological:      Mental Status: She is alert and oriented to person, place, and time. Psychiatric:         Mood and Affect: Mood normal.      Ortho Exam      Nurses Notes and Vital Signs reviewed.     DATA  Labs:  Benzodiazepine Screen, Urine   Date Value Ref Range Status   08/13/2020 NEGATIVE NEGATIVE Final     Comment:           (Positive cutoff 200 ng/mL)                    8/13/2020 10:38 AM - [place], pn Incoming Lab Results From [facility]     Component  Value  Ref Range & Units  Status  Collected  Lab    Amphetamine Screen, Ur  NEGATIVE  NEGATIVE  Final  08/13/2020  9:33 AM  [facility]           (Positive cutoff 1000 ng/mL)                Barbiturate Screen, Ur  NEGATIVE  NEGATIVE  Final  08/13/2020  9:33 AM  [facility] [address] [facility]           (Positive cutoff 200 ng/mL)                Benzodiazepine Screen, Urine  NEGATIVE  NEGATIVE  Final  08/13/2020  9:33 AM  [facility] [address] [facility]           (Positive cutoff 200 ng/mL)                Cocaine Metabolite, Urine  NEGATIVE  NEGATIVE  Final  08/13/2020  9:33 AM  [facility]           (Positive cutoff 300 ng/mL)                Methadone Screen, Urine  NEGATIVE  NEGATIVE  Final  08/13/2020  9:33 AM  [facility]           (Positive cutoff 300 ng/mL)                Opiates, Urine  NEGATIVE  NEGATIVE  Final  08/13/2020  9:33 AM  [facility]           (Positive cutoff 300 ng/mL)                Phencyclidine, Urine  NEGATIVE  NEGATIVE  Final  08/13/2020  9:33 AM  [facility] [address] [facility]           (Positive cutoff 25 ng/mL)                Propoxyphene, Urine  NOT REPORTED  NEGATIVE  Final  08/13/2020  9:33 AM  [facility]    Cannabinoid Scrn, Ur  NEGATIVE  NEGATIVE  Final  08/13/2020  9:33 AM  [facility]           (Positive cutoff 50 ng/mL)                Oxycodone Screen, Ur  NEGATIVE  NEGATIVE  Final  08/13/2020  9:33 AM  [facility]           (Positive cutoff 100 ng/mL)                Methamphetamine, Urine  NOT REPORTED  NEGATIVE  Final  08/13/2020  9:33 AM  [facility] [address] [facility]    Tricyclic Antidepressants, Urine  NOT REPORTED  NEGATIVE  Final  08/13/2020  9:33 AM  [facility]    MDMA, Urine  NOT REPORTED  NEGATIVE  Final  08/13/2020  9:33 AM  [facility]    Buprenorphine Urine  NOT REPORTED  NEGATIVE  Final  08/13/2020  9:33 AM  [facility]    Test Information             Imaging:  Radiology Images and Reports reviewed where indicated and necessary  EXAMINATION:    3 XRAY VIEWS OF THE LEFT KNEE; 3 XRAY VIEWS OF THE RIGHT KNEE         3/9/2019 12:39 pm         COMPARISON:    None         HISTORY:    ORDERING SYSTEM PROVIDED HISTORY: Arthritis of both knees    TECHNOLOGIST PROVIDED HISTORY:    Acuity: Chronic    Type of Exam: Unknown         FINDINGS:    Left knee:         There is joint space narrowing most pronounced medially with a slight genu    varus deformity.  Osteophyte formation is seen about all three compartments.     No joint effusion or fracture is seen.         Right knee:         Joint space narrowing is most pronounced medially and there is a genu varus    deformity.  Osteophyte formation is seen about all three compartments.  No    joint effusion or fracture is seen.              Impression    Moderate to severe tricompartmental osteoarthritis of the knees with    bilateral genu varus deformities. Patient Active Problem List   Diagnosis    Hypertension    Hyperlipidemia    Chronic back pain    PCOS (polycystic ovarian syndrome)    Schizophrenia ([facility]    Depression    Genital warts    Dysmenorrhea    Menorrhagia    Abnormal uterine bleeding (AUB)    Endometrium, polyp    Arthritis    Bilateral chronic knee pain    Astigmatism, regular    Diabetes mellitus type 2 in obese ([address]    Increased frequency of urination    Myopia    OAG (open angle glaucoma) suspect, low risk    Urge incontinence of urine    Primary osteoarthritis of both knees    Morbid obesity with BMI of 60.0-69.9, adult ([facility])        ASSESSMENT    Tammie Cathlyn Oppenheim is a 45 y.o. female with     1. Primary osteoarthritis of both knees    2. Morbid obesity with BMI of 60.0-69.9, adult ([facility] [address]    3. Bilateral chronic knee pain    4. Medication management           PLAN  Patient's   [x] x-ray    [] CT scan    [] MRI  Were/was  Reviewed. These findings are consistent with the patient's symptoms and physical examination. [] Patient's findings on the x-ray were explained to the patient . Other reports reviewed include    [] Bone scan   [] EMG and nerve conduction studies   [] Referral reports-  I also discussed with him the following treatment options Including advantages and disadvantages of each:    [] Physical therapy    [] Interventional pain treatment    [] Medication management    [] Surgical options    Patient's OARRS were reviewed. It is acceptable and appears patient is not receiving prescriptions from multiple prescribers. Patient is  forthcoming regarding prescriptions for pain medication in the past  Controlled Substances Monitoring: Periodic Controlled Substance Monitoring: No signs of potential drug abuse or diversion identified. , Assessed functional status. (Sandra Osborn MD)    Counselling/Preventive measures for pain Control:    [x]  Knee strengthening exercises are discussed with patient in detail. I had a lengthy discussion with the patient regarding her options for pain control as well as getting health back. Patient is somewhat depressed because of her weight and inability to move. Patient was told to get a drug screen done today. I agreed to give her 2 weeks worth of Tylenol 3 as it helped her pain in the past.  I will also refer her to physical therapy and aquatic therapy as well as referral to nutrition services to help with her diet planning. I did discuss with her the importance of adhering to a strict diet plan and an exercise plan to help with with weight loss and strengthening the muscles so that she can be able to ambulate somewhat and also can have knee replacement surgery in future. Orders Placed This Encounter   Procedures    DRUG SCREEN, PAIN     Standing Status:   Future     Standing Expiration Date:   11/17/2021    DRUG SCREEN, PAIN     Standing Status:   Future     Standing Expiration Date:   11/17/2021    Amb Referral to Nutrition Services     Referral Priority:   Routine     Referral Type:   Consult for Advice and Opinion     Requested Specialty:   Nutrition     Number of Visits Requested:   1    PT aquatic therapy     TBD by Pain Clinic MD     Standing Status:   Future     Standing Expiration Date:   11/17/2021    PT eval and treat     TBD by Pain Clinic MD     Standing Status:   Future     Standing Expiration Date:   11/17/2021     Orders Placed This Encounter   Medications    DISCONTD: acetaminophen-codeine (TYLENOL/CODEINE #3) 300-30 MG per tablet     Sig: Take 1 tablet by mouth 2 times daily as needed for Pain for up to 15 days. Intended supply: 3 days.  Take lowest dose possible to manage pain     Dispense:  30 tablet     Refill:  0     Reduce doses taken as pain becomes manageable    acetaminophen-codeine (TYLENOL/CODEINE #3) 300-30 MG per tablet     Sig: Take 1 tablet by mouth 2 times daily as needed for Pain for up to 15 days. Intended supply: 3 days. Take lowest dose possible to manage pain     Dispense:  30 tablet     Refill:  0     Reduce doses taken as pain becomes manageable     Decision Making Process : Patient's health history and referral records thoroughly reviewed before focused physical examination and discussion with patient. Over 50% of today's visit is spent on examining the patient and counseling. Level of complexity of date to be reviewed is Moderate. The chart date reviewed include the following: Imaging Reports. Summary of Care. Time spent reviewing with patient the below reports:   Medication safety, Treatment options. Level of diagnosis and management options of this case is multiple: involving the following management options: Interventions as needed, medication management as appropriate, future visits, activity modification, heat/ice as needed, Urine drug screen as required. [x]The patient's questions were answered to the best of my abilities. Return in  2 weeks  with Gracy Elliott M.D.  for further plan of treatment. This note was created using voice recognition software. There may be inaccuracies of transcription  that are inadvertently overlooked prior to the signature. There is any questions about the transcription please contact me. Due to the COVID-19 pandemic and the appropriate interventions by [facility], our non-urgent pain management patients will not be seen in the office at this time for their protection and the protection of our staff.  To offer continuity of care, their prescriptions will be escribed this month after a careful chart review and review of their OARRS report  Pursuant to the emergency declaration under the [employer] and [facility], 1135 waiver authority and the [employer] General Act, this Virtual Visit was conducted, with patient's consent, to reduce the patient's risk of exposure to COVID-19 and provide continuity of care for an established patient. Services were provided through a video synchronous discussion virtually to substitute for in-person appointment. \"  Documentation:  I communicated with the patient and/or health care decision maker about plan of care  Details of this discussion including any medical advice provided: Total Time: 45-55 minutes    I affirm this is a Patient Initiated Episode with an Established Patient who has not had a related appointment within my department in the past 7 days or scheduled within the next 24 hours. This note was created using voice recognition software. There may be inaccuracies of transcription  that are inadvertently overlooked prior to the signature. There is any questions about the transcription please contact me.     Electronically signed by Courtenay Bamberger, MD on 11/17/2020 at 1:59 PM

## 2020-11-17 ENCOUNTER — HOSPITAL ENCOUNTER (OUTPATIENT)
Dept: PAIN MANAGEMENT | Age: 50
Discharge: HOME | End: 2020-11-17
Payer: COMMERCIAL

## 2020-11-17 PROCEDURE — 99203 OFFICE O/P NEW LOW 30 MIN: CPT

## 2020-11-17 PROCEDURE — 99443 PR PHYS/QHP TELEPHONE EVALUATION 21-30 MIN: CPT | Performed by: PAIN MEDICINE

## 2020-11-17 RX ORDER — ACETAMINOPHEN AND CODEINE PHOSPHATE 300; 30 MG/1; MG/1
1 TABLET ORAL 2 TIMES DAILY PRN
Qty: 30 TABLET | Refills: 0 | Status: SHIPPED | OUTPATIENT
Start: 2020-11-17 | End: 2020-12-11 | Stop reason: SDUPTHER

## 2020-11-17 RX ORDER — ACETAMINOPHEN AND CODEINE PHOSPHATE 300; 30 MG/1; MG/1
1 TABLET ORAL 2 TIMES DAILY PRN
Qty: 30 TABLET | Refills: 0 | Status: SHIPPED | OUTPATIENT
Start: 2020-11-17 | End: 2020-11-17 | Stop reason: SDUPTHER

## 2020-11-17 ASSESSMENT — ENCOUNTER SYMPTOMS
NAUSEA: 0
VOMITING: 0
DIARRHEA: 0
RECTAL PAIN: 0
RHINORRHEA: 0
EYE PAIN: 0
COUGH: 0

## 2020-11-18 ENCOUNTER — TELEPHONE (OUTPATIENT)
Dept: PAIN MANAGEMENT | Age: 50
End: 2020-11-18

## 2020-11-18 NOTE — TELEPHONE ENCOUNTER
Patient calls [facility] and states she went in for UDS; was not able to get it done because her insurance won't pay for it with the codes listed. I told her I would send Dr Kristian Reyes a message to see if he can add additional diagnosis codes. I told the patient I will call her if/when doctor adds more Dx codes. Patient agrees.

## 2020-11-30 ENCOUNTER — HOSPITAL ENCOUNTER (OUTPATIENT)
Age: 50
Setting detail: SPECIMEN
Discharge: HOME | End: 2020-11-30
Payer: COMMERCIAL

## 2020-11-30 PROCEDURE — 80307 DRUG TEST PRSMV CHEM ANLYZR: CPT

## 2020-12-03 LAB
6-ACETYLMORPHINE, UR: NOT DETECTED
7-AMINOCLONAZEPAM, URINE: NOT DETECTED
ALPHA-OH-ALPRAZ, URINE: NOT DETECTED
ALPRAZOLAM, URINE: NOT DETECTED
AMPHETAMINES, URINE: NOT DETECTED
BARBITURATES, URINE: NOT DETECTED
BENZOYLECGONINE, UR: NOT DETECTED
BUPRENORPHINE URINE: NOT DETECTED
CARISOPRODOL, UR: NOT DETECTED
CLONAZEPAM, URINE: NOT DETECTED
CODEINE, URINE: NOT DETECTED
CREATININE URINE: 246.2 MG/DL (ref 20–400)
DIAZEPAM, URINE: NOT DETECTED
DRUGS EXPECTED, UR: NORMAL
EER HI RES INTERP UR: NORMAL
ETHYL GLUCURONIDE UR: NOT DETECTED
FENTANYL URINE: NOT DETECTED
HYDROCODONE, URINE: NOT DETECTED
HYDROMORPHONE, URINE: NOT DETECTED
LORAZEPAM, URINE: NOT DETECTED
MARIJUANA METAB, UR: NOT DETECTED
MDA, UR: NOT DETECTED
MDEA, EVE, UR: NOT DETECTED
MDMA URINE: NOT DETECTED
MEPERIDINE METAB, UR: NOT DETECTED
METHADONE, URINE: NOT DETECTED
METHAMPHETAMINE, URINE: NOT DETECTED
METHYLPHENIDATE: NOT DETECTED
MIDAZOLAM, URINE: NOT DETECTED
MORPHINE URINE: NOT DETECTED
NORBUPRENORPHINE, URINE: NOT DETECTED
NORDIAZEPAM, URINE: NOT DETECTED
NORFENTANYL, URINE: NOT DETECTED
NORHYDROCODONE, URINE: NOT DETECTED
NOROXYCODONE, URINE: NOT DETECTED
NOROXYMORPHONE, URINE: NOT DETECTED
OXAZEPAM, URINE: NOT DETECTED
OXYCODONE URINE: NOT DETECTED
OXYMORPHONE, URINE: NOT DETECTED
PAIN MANAGEMENT DRUG PANEL INTERP, URINE: NORMAL
PAIN MGT DRUG PANEL, HI RES, UR: NORMAL
PCP,URINE: NOT DETECTED
PHENTERMINE, UR: NOT DETECTED
PROPOXYPHENE, URINE: NOT DETECTED
TAPENTADOL, URINE: NOT DETECTED
TAPENTADOL-O-SULFATE, URINE: NOT DETECTED
TEMAZEPAM, URINE: NOT DETECTED
TRAMADOL, URINE: NOT DETECTED
ZOLPIDEM, URINE: NOT DETECTED

## 2020-12-10 ENCOUNTER — HOSPITAL ENCOUNTER (OUTPATIENT)
Age: 50
Discharge: HOME | End: 2020-12-10
Payer: COMMERCIAL

## 2020-12-10 LAB
ABSOLUTE EOS #: 0.36 K/UL (ref 0–0.44)
ABSOLUTE IMMATURE GRANULOCYTE: 0.03 K/UL (ref 0–0.3)
ABSOLUTE LYMPH #: 2.19 K/UL (ref 1.1–3.7)
ABSOLUTE MONO #: 0.5 K/UL (ref 0.1–1.2)
ALBUMIN SERPL-MCNC: 3.5 G/DL (ref 3.5–5.2)
ALBUMIN/GLOBULIN RATIO: 0.9 (ref 1–2.5)
ALP BLD-CCNC: 75 U/L (ref 35–104)
ALT SERPL-CCNC: 13 U/L (ref 5–33)
ANION GAP SERPL CALCULATED.3IONS-SCNC: 12 MMOL/L (ref 9–17)
AST SERPL-CCNC: 18 U/L
BASOPHILS # BLD: 1 % (ref 0–2)
BASOPHILS ABSOLUTE: 0.06 K/UL (ref 0–0.2)
BILIRUB SERPL-MCNC: 0.16 MG/DL (ref 0.3–1.2)
BNP INTERPRETATION: NORMAL
BUN BLDV-MCNC: 13 MG/DL (ref 6–20)
BUN/CREAT BLD: ABNORMAL (ref 9–20)
CALCIUM SERPL-MCNC: 9.3 MG/DL (ref 8.6–10.4)
CHLORIDE BLD-SCNC: 103 MMOL/L (ref 98–107)
CHOLESTEROL/HDL RATIO: 3.3
CHOLESTEROL: 176 MG/DL
CO2: 26 MMOL/L (ref 20–31)
CREAT SERPL-MCNC: 0.81 MG/DL (ref 0.5–0.9)
DIFFERENTIAL TYPE: ABNORMAL
EOSINOPHILS RELATIVE PERCENT: 6 % (ref 1–4)
ESTIMATED AVERAGE GLUCOSE: 143 MG/DL
GFR AFRICAN AMERICAN: >60 ML/MIN
GFR NON-AFRICAN AMERICAN: >60 ML/MIN
GFR SERPL CREATININE-BSD FRML MDRD: ABNORMAL ML/MIN/{1.73_M2}
GFR SERPL CREATININE-BSD FRML MDRD: ABNORMAL ML/MIN/{1.73_M2}
GLUCOSE BLD-MCNC: 96 MG/DL (ref 70–99)
HBA1C MFR BLD: 6.6 % (ref 4–6)
HCT VFR BLD CALC: 40.3 % (ref 36.3–47.1)
HDLC SERPL-MCNC: 53 MG/DL
HEMOGLOBIN: 12.3 G/DL (ref 11.9–15.1)
IMMATURE GRANULOCYTES: 1 %
LDL CHOLESTEROL: 113 MG/DL (ref 0–130)
LYMPHOCYTES # BLD: 34 % (ref 24–43)
MCH RBC QN AUTO: 27.4 PG (ref 25.2–33.5)
MCHC RBC AUTO-ENTMCNC: 30.5 G/DL (ref 28.4–34.8)
MCV RBC AUTO: 89.8 FL (ref 82.6–102.9)
MONOCYTES # BLD: 8 % (ref 3–12)
NRBC AUTOMATED: 0 PER 100 WBC
PDW BLD-RTO: 15.9 % (ref 11.8–14.4)
PLATELET # BLD: 260 K/UL (ref 138–453)
PLATELET ESTIMATE: ABNORMAL
PMV BLD AUTO: 11 FL (ref 8.1–13.5)
POTASSIUM SERPL-SCNC: 3.8 MMOL/L (ref 3.7–5.3)
PRO-BNP: 42 PG/ML
RBC # BLD: 4.49 M/UL (ref 3.95–5.11)
RBC # BLD: ABNORMAL 10*6/UL
SEG NEUTROPHILS: 50 % (ref 36–65)
SEGMENTED NEUTROPHILS ABSOLUTE COUNT: 3.33 K/UL (ref 1.5–8.1)
SERUM OSMOLALITY: 297 MOSM/KG (ref 275–295)
SODIUM BLD-SCNC: 141 MMOL/L (ref 135–144)
TOTAL PROTEIN: 7.3 G/DL (ref 6.4–8.3)
TRIGL SERPL-MCNC: 49 MG/DL
TSH SERPL DL<=0.05 MIU/L-ACNC: 0.94 MIU/L (ref 0.3–5)
VITAMIN B-12: 731 PG/ML (ref 232–1245)
VITAMIN D 25-HYDROXY: 19.3 NG/ML (ref 30–100)
VLDLC SERPL CALC-MCNC: NORMAL MG/DL (ref 1–30)
WBC # BLD: 6.5 K/UL (ref 3.5–11.3)
WBC # BLD: ABNORMAL 10*3/UL

## 2020-12-10 PROCEDURE — 36415 COLL VENOUS BLD VENIPUNCTURE: CPT

## 2020-12-10 PROCEDURE — 85025 COMPLETE CBC W/AUTO DIFF WBC: CPT

## 2020-12-10 PROCEDURE — 83930 ASSAY OF BLOOD OSMOLALITY: CPT

## 2020-12-10 PROCEDURE — 82306 VITAMIN D 25 HYDROXY: CPT

## 2020-12-10 PROCEDURE — 83036 HEMOGLOBIN GLYCOSYLATED A1C: CPT

## 2020-12-10 PROCEDURE — 82607 VITAMIN B-12: CPT

## 2020-12-10 PROCEDURE — 80061 LIPID PANEL: CPT

## 2020-12-10 PROCEDURE — 84443 ASSAY THYROID STIM HORMONE: CPT

## 2020-12-10 PROCEDURE — 80053 COMPREHEN METABOLIC PANEL: CPT

## 2020-12-10 PROCEDURE — 83880 ASSAY OF NATRIURETIC PEPTIDE: CPT

## 2020-12-10 ASSESSMENT — ENCOUNTER SYMPTOMS
RECTAL PAIN: 0
NAUSEA: 0
EYES NEGATIVE: 1
VOMITING: 0
RHINORRHEA: 0
COUGH: 0
SHORTNESS OF BREATH: 1
DIARRHEA: 0
ALLERGIC/IMMUNOLOGIC NEGATIVE: 1
EYE PAIN: 0
WHEEZING: 1
GASTROINTESTINAL NEGATIVE: 1

## 2020-12-10 NOTE — PROGRESS NOTES
Sathya Gibson is a 45 y.o. female evaluated on 12/11/2020. Modality of virtual service provided -via  telephone   Consent:  Patient and/or health care decision maker is aware that that patient may receive a bill for this telephone service, depending on one's insurance coverage, and has provided verbal consent to proceed: Yes    Patient identification was verified at the start of the visit: Yes    Chief complaint: Sathya Gibson is 45 y.o., [place] female, with  No chief complaint on file. Derick Levi Referring Diagnosis   M25.519 (ICD-10-CM) - Pain in unspecified shoulder     Patient is complaining of pain involving the knees bilaterally. The pain is worse on the right side compared to the left. Patient reports her level of pain fluctuates in the knees depending on her activity. She previously had undergone knee joint injections which helped initially but later on he was not getting good pain relief. Patient reports she is taking Tylenol 3 which was helping the pain to a certain extent. Knee Pain    There was no injury mechanism. The pain is present in the right knee and left knee (Right side is worse than the left). The quality of the pain is described as cramping (Real bad pain). The pain is at a severity of 8/10 (6-9). The pain is severe. The pain has been constant since onset. Associated symptoms include muscle weakness and tingling. Associated symptoms comments: Legs are weak and cannot walk. Alleviating factors:heat   Lifestyle changes experienced with pain: Prevents or limits ADLs, Increases w/activity. Increases w/prolonged sitting/standing/walking  Mood changes,angry  Patient currently unemployed. Physical therapy did help the pain a little. Are you under psychological counseling at present: Yes for depression and schizophrenia  Goals for treatment include:  Decrease in pain  Enjoy daily and recreational activities, return to previous status.     Patient relates current medications are helping the pain. Patient reports taking pain medications as prescribed, denies obtaining medications from different sources and denies use of illegal drugs. Patient denies side effects from medications like nausea, vomiting, constipation or drowsiness. Patient reports current activities of daily living ar possible due to medications and would like to continue them. ACTIVITY/SOCIAL/EMOTIONAL:  Sleep Pattern: 8 hours per night. generally restful sleep  Home Exercises: daily Leg and arm exercises every day  Activity:not significantly changed  Emotional Issues:depression and Anxiety.    Currently seeing a Psychiatrist or Psychologist:  Yes     ADVERSE MEDICATION EFFECTS:   Nausea and vomiting: no   Constipation: no-Undercontrol-: yes  Dizziness/drowsy/sleepy--no  Urinary Retention: no    ABERRANT BEHAVIORS SINCE LAST VISIT  Lost rx/pills:------------------------------------------ no  Taking  medication as prescribed: ----------- yes  Urine Drug Screen ---------------------------------  yes  Recent ER visits: -------------------------------------No  Pill count is appropriate: ---------------------------yes   Refills for prescriptions appropriate:---------- yes      Past Medical History:   Diagnosis Date    Asthma     Mild intermittent asthma without complication    Astigmatism, regular 4/12/2017    Bronchitis 2009    Chronic back pain     on 2/18/20 pt is presently on pain mgmnt    Depression 5/1/2014    Diabetes mellitus ([facility]     Type II    Frequency of urination     Hyperlipidemia     Hypertension     Increased frequency of urination 8/27/2018    Morbid obesity with body mass index (BMI) of 50.0 to 59.9 in adult ([facility] [address] [facility]     Myopia 4/12/2017    OAG (open angle glaucoma) suspect, low risk 5/4/2017    Palpitations     occasional    [facility]     Sleep apnea since Oct. 2017    CPAP nightly    Urge incontinence of urine 8/27/2018    Wears glasses        Past Surgical History: name: None    Number of children: 0    Years of education: None    Highest education level: None   Occupational History    None   Social Needs    Financial resource strain: None    Food insecurity     Worry: None     Inability: None    Transportation needs     Medical: None     Non-medical: None   Tobacco Use    Smoking status: Never Smoker    Smokeless tobacco: Never Used    Tobacco comment: associates smoking   Substance and Sexual Activity    Alcohol use: No    Drug use: No    Sexual activity: Never   Lifestyle    Physical activity     Days per week: None     Minutes per session: None    Stress: None   Relationships    Social connections     Talks on phone: None     Gets together: None     Attends Sabianism service: None     Active member of club or organization: None     Attends meetings of clubs or organizations: None     Relationship status: None    Intimate partner violence     Fear of current or ex partner: None     Emotionally abused: None     Physically abused: None     Forced sexual activity: None   Other Topics Concern    None   Social History Narrative    None       Allergies   Allergen Reactions    Lisinopril      Makes her cough       Current Outpatient Medications on File Prior to Encounter   Medication Sig Dispense Refill    meloxicam (MOBIC) 7.5 MG tablet Take 7.5 mg by mouth daily      lidocaine (LMX) 4 % cream Apply topically as needed      fluticasone-vilanterol (BREO ELLIPTA) 100-25 MCG/INH AEPB inhaler Inhale 1 puff into the lungs daily      diclofenac sodium (VOLTAREN) 1 % GEL Apply 4 g topically 4 times daily 4 Tube 3    Tens Unit MISC by Does not apply route 1 each 0    TRAVATAN Z 0.004 % SOLN ophthalmic solution PLACE 1 DROP IN BOTH EYES EACH NIGHT  1    triamcinolone (KENALOG) 0.025 % cream Apply to rash twice on back of neck twice daily 80 g 2    tretinoin (RETIN-A) 0.025 % cream Apply pea sized amount to face, chest and back nightly 45 g 3    bisacodyl TECHNOLOGIST PROVIDED HISTORY:    Acuity: Chronic    Type of Exam: Unknown         FINDINGS:    Left knee:         There is joint space narrowing most pronounced medially with a slight genu    varus deformity.  Osteophyte formation is seen about all three compartments. No joint effusion or fracture is seen.         Right knee:         Joint space narrowing is most pronounced medially and there is a genu varus    deformity.  Osteophyte formation is seen about all three compartments.  No    joint effusion or fracture is seen.              Impression    Moderate to severe tricompartmental osteoarthritis of the knees with    bilateral genu varus deformities.             Clinical  impression:  1. Primary osteoarthritis of both knees    2. Morbid obesity with BMI of 60.0-69.9, adult ([address]    3. Bilateral chronic knee pain        Plan of care: We will continue current pain medications  Current medications are being tolerated without any Adverse side effects. Orders Placed This Encounter   Medications    acetaminophen-codeine (TYLENOL/CODEINE #3) 300-30 MG per tablet     Sig: Take 1 tablet by mouth 2 times daily as needed for Pain for up to 30 days. Intended supply: 3 days. Take lowest dose possible to manage pain     Dispense:  90 tablet     Refill:  0     Reduce doses taken as pain becomes manageable     Urine drug screens have been appropriate. No aberrant activity noted. Analgesia is achieved. Activities of daily living are possible because of medications. Safe use of medications explained to patient. PDMP Monitoring:    Last PDMP Roro Ron as Reviewed [facility]):  Review User Review Instant Review Result   Nani Aramis 12/10/2020  4:34 AM Reviewed PDMP [1]     Counselling/Preventive measures for pain  Control:    [x]  Spine strengthening exercises are discussed with patient in detail.      [x] Ill effects of being on chronic pain medications such as sleep disturbances, hormonal changes, withdrawal symptoms, chronic opioid dependence and tolerance were discussed with patient. I had asked the patient to minimize medication use and utilize pain medications only for uncontrolled rest pain or pain with exertional activities. I advised patient not to self escalate pain medications without consulting with us. At each of patient's future visits we will try to taper pain medications, while adjusting the adjunct medications, and re-evaluating for Physical Therapy to improve spinal and joint strength. We will continue to have discussions to decrease pain medications as tolerated. I also discussed with the patient regarding the dangers of combining narcotic pain medication with tranquilizers, alcohol or illegal drugs or taking the medication any other than prescribed. The dangers including the respiratory depression and death. Patient was told to tell  to all  physicians regarding the medications he is getting from pain clinic. Patient is warned not to take any unprescribed medications over-the-counter medications that can depress breathing . Patient is advised to talk to the pharmacist or physicians if planning to take any over-the-counter medications before  takeing them. Patient is strongly advised to avoid tranquilizers or  Relaxants for any medications that can depress breathing or recreational drugs. Patient is also advised to tell us if there is any changes in his medications from other physicians. We discussed the same at today's visit and have not been to implement it, as the patient's pain is not under control with current medications. Decision Making Process : Patient's health history and referral records thoroughly reviewed before focused physical examination and discussion with patient. I have spent 20 mins. Over 50% of today's visit is spent on examining the patient and counseling and coordinating the care. Level of complexity of date to be reviewed is Moderate.  The chart date reviewed include the following: Imaging Reports. Summary of Care. Time spent reviewing with patient the below reports:   Medication safety, Treatment options. Level of diagnosis and management options of this case is multiple: involving the following management options: Interventions as needed, medication management as appropriate, future visits, activity modification, heat/ice as needed, Urine drug screen as required. [x]The patient's questions were answered to the best of my abilities. This note was created using voice recognition software. There may be inaccuracies of transcription  that are inadvertently overlooked prior to the signature. There is any questions about the transcription please contact me. Return in  4 weeks  with physician / CNP  for further plan of treatment. Due to the COVID-19 pandemic and the appropriate interventions by Scottie Hooks, our non-urgent pain management patients will not be seen in the office at this time for their protection and the protection of our staff. To offer continuity of care, their prescriptions will be escribed this month after a careful chart review and review of their OARRS report  Pursuant to the emergency declaration under the [employer] [facility] waiver authority and the [email or web address] and [employer] Act, this Virtual Visit was conducted, with patient's consent, to reduce the patient's risk of exposure to COVID-19 and provide continuity of care for an established patient. Services were provided through a video synchronous discussion virtually to substitute for in-person appointment. \"  Documentation:  I communicated with the patient and/or health care decision maker about plan of care  Details of this discussion including any medical advice provided:   Total Time: minutes: 21-30 minutes    I affirm this is a Patient Initiated Episode with an Established Patient who has not

## 2020-12-11 ENCOUNTER — HOSPITAL ENCOUNTER (OUTPATIENT)
Dept: PAIN MANAGEMENT | Age: 50
Discharge: HOME | End: 2020-12-11
Payer: COMMERCIAL

## 2020-12-11 PROCEDURE — 99443 PR PHYS/QHP TELEPHONE EVALUATION 21-30 MIN: CPT | Performed by: PAIN MEDICINE

## 2020-12-11 PROCEDURE — 99213 OFFICE O/P EST LOW 20 MIN: CPT

## 2020-12-11 RX ORDER — ACETAMINOPHEN AND CODEINE PHOSPHATE 300; 30 MG/1; MG/1
1 TABLET ORAL 2 TIMES DAILY PRN
Qty: 90 TABLET | Refills: 0 | Status: SHIPPED | OUTPATIENT
Start: 2020-12-11 | End: 2021-01-26 | Stop reason: SDUPTHER

## 2020-12-11 ASSESSMENT — ENCOUNTER SYMPTOMS
PHOTOPHOBIA: 0
ABDOMINAL PAIN: 0
SORE THROAT: 0

## 2021-01-19 ENCOUNTER — TELEPHONE (OUTPATIENT)
Dept: PAIN MANAGEMENT | Age: 51
End: 2021-01-19

## 2021-01-19 NOTE — TELEPHONE ENCOUNTER
Patient calls for a refill on her Rx. Informed patient she woould need to make an appointment to refill her prescription. Patient agreed to call Kamla and schedule and appointment.

## 2021-01-23 ASSESSMENT — ENCOUNTER SYMPTOMS
NAUSEA: 0
RHINORRHEA: 0
GASTROINTESTINAL NEGATIVE: 1
WHEEZING: 1
ALLERGIC/IMMUNOLOGIC NEGATIVE: 1
EYES NEGATIVE: 1
VOMITING: 0
EYE PAIN: 0
RECTAL PAIN: 0
DIARRHEA: 0
SORE THROAT: 0
COUGH: 0
ABDOMINAL PAIN: 0
SHORTNESS OF BREATH: 1
PHOTOPHOBIA: 0

## 2021-01-23 NOTE — PROGRESS NOTES
Breanne Mart is a 45 y.o. female evaluated on 1/26/2021. Modality of virtual service provided -via  telephone   Consent:  Patient and/or health care decision maker is aware that that patient may receive a bill for this telephone service, depending on one's insurance coverage, and has provided verbal consent to proceed: Yes    Patient identification was verified at the start of the visit: Yes    Chief complaint: Breanne Mart is 45 y.o.,  female, with pain in the knees bilaterally. Referring Diagnosis   M25.519 (ICD-10-CM) - Pain in unspecified shoulder     Patient is complaining of pain involving the knees bilaterally. She also reports her legs are swollen and she was diagnosed with lymphedema. She is somewhat concerned about it. She is trying to exercise and doing physical therapy. She is taking Tylenol 3 which is helping the pain. She is working on weight loss. Her knee pain is essentially unchanged from her previous visit. Knee Pain   There was no injury mechanism. The pain is present in the right knee and left knee (Right side is worse than the left). The quality of the pain is described as cramping (Real bad pain). The pain is at a severity of 8/10 (6-9). The pain is severe. The pain has been constant since onset. Associated symptoms include muscle weakness and tingling. Associated symptoms comments: Legs are weak and cannot walk. The symptoms are aggravated by movement and weight bearing. Alleviating factors:rest   Lifestyle changes experienced with pain: Wakes from sleep, Prevents or limits ADLs, Increases w/activity.  , Increases w/prolonged sitting/standing/walking  Mood changes,Depressed at times  Patient currently unemployed. Physical therapy  doing physical therapy now    Are you under psychological counseling at present: Yes  Goals for treatment include:  Decrease in pain  Enjoy daily and recreational activities, return to previous status.     Patient relates current medications are helping the pain. Patient reports taking pain medications as prescribed, denies obtaining medications from different sources and denies use of illegal drugs. Patient denies side effects from medications like nausea, vomiting, constipation or drowsiness. Patient reports current activities of daily living ar possible due to medications and would like to continue them. ACTIVITY/SOCIAL/EMOTIONAL:  Sleep Pattern: 7 hours per night. generally restful sleep  Home Exercises: daily walking  Activity:unchanged  Emotional Issues: Depression.    Currently seeing a Psychiatrist or Psychologist:  Yes     ADVERSE MEDICATION EFFECTS:   Nausea and vomiting: no   Constipation: no-Undercontrol-: yes  Dizziness/drowsy/sleepy--no  Urinary Retention: yes    ABERRANT BEHAVIORS SINCE LAST VISIT  Lost rx/pills:------------------------------------------ no  Taking  medication as prescribed: ----------- yes  Urine Drug Screen ---------------------------------  yes             Date------------------------------------------------- 11/30/2020              Results as expected ---------------------yes    Recent ER visits: -------------------------------------No  Pill count is appropriate: ---------------------------yes   Refills for prescriptions appropriate:---------- yes      Past Medical History:   Diagnosis Date    Asthma     Mild intermittent asthma without complication    Astigmatism, regular 4/12/2017    Bronchitis 2009    Chronic back pain     on 2/18/20 pt is presently on pain mgmnt    Depression 5/1/2014    Diabetes mellitus ([facility]     Type II    Frequency of urination     Hyperlipidemia     Hypertension     Increased frequency of urination 8/27/2018    Morbid obesity with body mass index (BMI) of 50.0 to 59.9 in adult ([facility]     Myopia 4/12/2017    OAG (open angle glaucoma) suspect, low risk 5/4/2017    Palpitations     occasional    Schizophrenia ([facility]     Sleep apnea since Oct. 2017    CPAP nightly    Urge incontinence of urine 8/27/2018    Wears glasses        Past Surgical History:   Procedure Laterality Date    DILATION AND CURETTAGE OF UTERUS N/A 11/14/2017    OPERATIVE HYSTEROSCOPY WITH MYOSURE, EXCISION ENDOMETRIAL POLYPS performed by Hamilton Hernandez MD at [address] NERV Bilateral 3/25/2019    BILATERAL KNEE FLORO GUIDED STEROID INJECTION performed by Domi Toro MD at [address] [facility] NERV Bilateral 5/13/2019    SYNVISC  MEDICATION INJECTION   FLUORO GUIDED  FABIOLA KNEES performed by Domi Toro MD at [address] [facility] NERV Bilateral 5/20/2019    INJECTION SYNVISC  FLUORO GUIDED FABIOLA KNEES performed by Domi Toro MD at [address] [facility] NERV Bilateral 6/10/2019    INJECTION SYNVISC FLUORO GUIDED FABIOLA KNEES performed by Domi Toro MD at [address] [facility] NERV Bilateral 11/18/2019    INJECTION SYNVISC BILATERAL KNEE performed by Domi Toro MD at [address] [facility]  11/14/2017    exc endometrial polyps    MEDICATION INJECTION N/A 8/17/2020    1ST -U/S GUIDED KNEE SYNVISC INJECTION performed by Domi Toro MD at [id number] N/A 8/24/2020    2ND -U/S GUIDED KNEE SYNVISC INJECTION performed by Domi Toro MD at [address] 8/31/2020    3RD U/S GUIDED KNEE SYNVISC INJECTION performed by Domi Toro MD at [address] Right 10/17/14    excision dorsal exostosis rt foot    OTHER SURGICAL HISTORY Bilateral 06/10/2019    INJECTION SYNVISC FLUORO GUIDED FABIOLA KNEES (Bilateral )       Family History   Problem Relation Age of Onset    Diabetes Mother     Heart Failure Mother     Diabetes Maternal Grandmother     Heart Failure Maternal Grandmother     No Known Problems Maternal Grandfather     No Known Problems Paternal Grandmother     No Known Problems Paternal Grandfather     Cancer Paternal Aunt        Social History     Socioeconomic History    Marital status: Single     Spouse name: None    Number of children: 0    Years of education: None    Highest education level: None   Occupational History    None   Social Needs    Financial resource strain: None    Food insecurity     Worry: None     Inability: None    Transportation needs     Medical: None     Non-medical: None   Tobacco Use    Smoking status: Never Smoker    Smokeless tobacco: Never Used    Tobacco comment: associates smoking   Substance and Sexual Activity    Alcohol use: No    Drug use: No    Sexual activity: Never   Lifestyle    Physical activity     Days per week: None     Minutes per session: None    Stress: None   Relationships    Social connections     Talks on phone: None     Gets together: None     Attends [employer] service: None     Active member of club or organization: None     Attends meetings of clubs or organizations: None     Relationship status: None    Intimate partner violence     Fear of current or ex partner: None     Emotionally abused: None     Physically abused: None     Forced sexual activity: None   Other Topics Concern    None   Social History Narrative    None       Allergies   Allergen Reactions    Lisinopril      Makes her cough       Current Outpatient Medications on File Prior to Encounter   Medication Sig Dispense Refill    meloxicam (MOBIC) 7.5 MG tablet Take 7.5 mg by mouth daily      lidocaine (LMX) 4 % cream Apply topically as needed      fluticasone-vilanterol (BREO ELLIPTA) 100-25 MCG/INH AEPB inhaler Inhale 1 puff into the lungs daily      diclofenac sodium (VOLTAREN) 1 % GEL Apply 4 g topically 4 times daily 4 Tube 3    Tens Unit MISC by Does not apply route 1 each 0    TRAVATAN Z 0.004 % SOLN ophthalmic solution PLACE 1 DROP IN BOTH EYES EACH NIGHT  1    triamcinolone (KENALOG) 0.025 % cream Apply to rash twice on back of neck twice daily 80 g 2    tretinoin (RETIN-A) 0.025 % cream Apply pea sized amount to face, chest and back nightly 45 g 3    bisacodyl (DULCOLAX) 5 MG EC tablet TAKE 4 TABS AT 10 AM THE DAY PRIOR TO COLONOSCOPY 4 tablet 0    Elastic Bandages & Supports (KNEE BRACE ADJUSTABLE HINGED) MISC Use as directed 1 each 0    Elastic Bandages & Supports (KNEE BRACE) MISC 2 Units by Does not apply route daily 2 each 0    gabapentin (NEURONTIN) 300 MG capsule Take 1 capsule by mouth. .      fluticasone (FLOVENT HFA) 110 MCG/ACT inhaler Inhale 1 puff into the lungs 2 times daily 1 Inhaler 11    albuterol sulfate  (90 Base) MCG/ACT inhaler Inhale 2 puffs into the lungs every 6 hours as needed for Wheezing 1 Inhaler 3    ibuprofen (ADVIL;MOTRIN) 800 MG tablet Take 1 tablet by mouth every 6 hours as needed for Pain 30 tablet 0    DICLOFENAC POTASSIUM PO Take 50 mg by mouth 3 times daily as needed      ARIPiprazole (ABILIFY) 15 MG tablet Take 15 mg by mouth nightly       traZODone (DESYREL) 50 MG tablet Take 50 mg by mouth nightly      pravastatin (PRAVACHOL) 80 MG tablet Take 80 mg by mouth nightly       oxybutynin (DITROPAN-XL) 10 MG extended release tablet Take 10 mg by mouth daily      potassium chloride (MICRO-K) 10 MEQ CR capsule Take 10 mEq by mouth daily.  valsartan (DIOVAN) 40 MG tablet Take 40 mg by mouth nightly Patient takes at night      metFORMIN (GLUCOPHAGE) 500 MG tablet Take 500 mg by mouth daily (with breakfast).  hydrochlorothiazide (MICROZIDE) 12.5 MG capsule Take 12.5 mg by mouth nightly       amLODIPine (NORVASC) 10 MG tablet Take 10 mg by mouth nightly Patient takes at night       No current facility-administered medications on file prior to encounter. Review of Systems   Constitutional: Negative. Negative for activity change, appetite change, fatigue and unexpected weight change. HENT: Negative. Negative for congestion, ear pain, hearing loss, rhinorrhea, sore throat and voice change. Eyes: Negative. Negative for photophobia, pain, discharge and visual disturbance. Respiratory: Positive for shortness of breath and wheezing. Negative for cough. Cardiovascular: Negative. Negative for palpitations and leg swelling. HTN   Gastrointestinal: Negative. Negative for abdominal pain, diarrhea, nausea, rectal pain and vomiting. Endocrine: Negative. Negative for cold intolerance and polyuria. H/ diabetes mellitusborderline   Genitourinary: Negative. Negative for dysuria and genital sores. Musculoskeletal: Positive for arthralgias and joint swelling. Skin: Negative. Negative for rash. Allergic/Immunologic: Negative. Negative for immunocompromised state. Neurological: Positive for tingling. Negative for seizures and weakness. Hematological: Does not bruise/bleed easily. Psychiatric/Behavioral: Negative for hallucinations, self-injury, sleep disturbance and suicidal ideas. The patient is nervous/anxious. The patient is not hyperactive. Physical Exam  Skin:         Neurological:      Mental Status: She is alert and oriented to person, place, and time.    Psychiatric:         Mood and Affect: Mood normal.            DATA:  LAB.:  12/10/2020  3:53 PM - [facility] Incoming Lab Results From [facility]    Component Value Ref Range & Units Status Collected Lab   Vit D, 25-Hydroxy 19.3Low   30.0 - 100.0 ng/mL Final 12/10/2020 12:52  [address]         12/3/2020  2:41 PM - [facility] Incoming Lab Results From [facility] Value Ref Range & Units Status Collected Lab   Pain Management Drug Panel Interp, Urine Consistent   Final 11/30/2020  4:37 PM [facility]   (NOTE)   ________________________________________________________________   DRUGS EXPECTED:   NO DRUGS EXPECTED   ________________________________________________________________   CONSISTENT with medications provided:   NO DRUGS DETECTED      X-Ray reports:     3 XRAY VIEWS OF THE LEFT KNEE; 3 XRAY VIEWS OF THE RIGHT KNEE        3/9/2019 12:39 pm        COMPARISON:    None        HISTORY:    ORDERING SYSTEM PROVIDED HISTORY: Arthritis of both knees    TECHNOLOGIST PROVIDED HISTORY:    Acuity: Chronic    Type of Exam: Unknown        FINDINGS:    Left knee: There is joint space narrowing most pronounced medially with a slight genu    varus deformity. Osteophyte formation is seen about all three compartments. No joint effusion or fracture is seen. Right knee:        Joint space narrowing is most pronounced medially and there is a genu varus    deformity. Osteophyte formation is seen about all three compartments. No    joint effusion or fracture is seen. Impression    Moderate to severe tricompartmental osteoarthritis of the knees with    bilateral genu varus deformities. 3 XRAY VIEWS OF THE LEFT KNEE; 3 XRAY VIEWS OF THE RIGHT KNEE        3/9/2019 12:39 pm        COMPARISON:    None        HISTORY:    ORDERING SYSTEM PROVIDED HISTORY: Arthritis of both knees    TECHNOLOGIST PROVIDED HISTORY:    Acuity: Chronic    Type of Exam: Unknown        FINDINGS:    Left knee: There is joint space narrowing most pronounced medially with a slight genu    varus deformity. Osteophyte formation is seen about all three compartments. No joint effusion or fracture is seen. Right knee:        Joint space narrowing is most pronounced medially and there is a genu varus    deformity. Osteophyte formation is seen about all three compartments. No    joint effusion or fracture is seen. Impression    Moderate to severe tricompartmental osteoarthritis of the knees with    bilateral genu varus deformities. Clinical  impression:  1. Primary osteoarthritis of both knees    2. Morbid obesity with BMI of 60.0-69.9, adult ([address]    3. Bilateral chronic knee pain    4. Chronic pain of both knees    5. Medication management        Plan of care:     We will continue current pain medications he is getting from pain clinic. Patient is warned not to take any unprescribed medications over-the-counter medications that can depress breathing . Patient is advised to talk to the pharmacist or physicians if planning to take any over-the-counter medications before  takeing them. Patient is strongly advised to avoid tranquilizers or  Relaxants for any medications that can depress breathing or recreational drugs. Patient is also advised to tell us if there is any changes in his medications from other physicians. We discussed the same at today's visit and have not been to implement it, as the patient's pain is not under control with current medications. Patient is advised on proper diet exercise and work on weight loss. She is extensively counseled on the importance of exercise core strengthening as well as diet and weight loss. Decision Making Process : Patient's health history and referral records thoroughly reviewed before focused physical examination and discussion with patient. I have spent 20 mins. Over 50% of today's visit is spent on examining the patient and counseling and coordinating the care. Level of complexity of date to be reviewed is Moderate. The chart date reviewed include the following: Imaging Reports. Summary of Care. Time spent reviewing with patient the below reports:   Medication safety, Treatment options. Level of diagnosis and management options of this case is multiple: involving the following management options: Interventions as needed, medication management as appropriate, future visits, activity modification, heat/ice as needed, Urine drug screen as required. [x]The patient's questions were answered to the best of my abilities. This note was created using voice recognition software. There may be inaccuracies of transcription  that are inadvertently overlooked prior to the signature.   There is any questions about the transcription please contact me.    Return in  4 weeks  with physician / CNP  for further plan of treatment. Due to the COVID-19 pandemic and the appropriate interventions by Scottie Hooks, our non-urgent pain management patients will not be seen in the office at this time for their protection and the protection of our staff. To offer continuity of care, their prescriptions will be escribed this month after a careful chart review and review of their OARRS report  Pursuant to the emergency declaration under the [employer] and [facility] [place], [address] waiver authority and the [employer] and [employer] Act, this Virtual Visit was conducted, with patient's consent, to reduce the patient's risk of exposure to COVID-19 and provide continuity of care for an established patient. Services were provided through a video synchronous discussion virtually to substitute for in-person appointment. \"  Documentation:  I communicated with the patient and/or health care decision maker about plan of care  Details of this discussion including any medical advice provided: Total Time: minutes: 21-30 minutes    I affirm this is a Patient Initiated Episode with an Established Patient who has not had a related appointment within my department in the past 7 days or scheduled within the next 24 hours.     Electronically signed by Rubio Beltran MD on 1/26/2021 at 1:58 PM

## 2021-01-26 ENCOUNTER — HOSPITAL ENCOUNTER (OUTPATIENT)
Dept: PAIN MANAGEMENT | Age: 51
Discharge: HOME | End: 2021-01-26
Payer: COMMERCIAL

## 2021-01-26 DIAGNOSIS — M25.562 BILATERAL CHRONIC KNEE PAIN: ICD-10-CM

## 2021-01-26 DIAGNOSIS — M25.561 BILATERAL CHRONIC KNEE PAIN: ICD-10-CM

## 2021-01-26 DIAGNOSIS — M17.0 PRIMARY OSTEOARTHRITIS OF BOTH KNEES: Primary | ICD-10-CM

## 2021-01-26 DIAGNOSIS — M25.561 CHRONIC PAIN OF BOTH KNEES: ICD-10-CM

## 2021-01-26 DIAGNOSIS — M25.562 CHRONIC PAIN OF BOTH KNEES: ICD-10-CM

## 2021-01-26 DIAGNOSIS — G89.29 BILATERAL CHRONIC KNEE PAIN: ICD-10-CM

## 2021-01-26 DIAGNOSIS — G89.29 CHRONIC PAIN OF BOTH KNEES: ICD-10-CM

## 2021-01-26 DIAGNOSIS — Z79.899 MEDICATION MANAGEMENT: ICD-10-CM

## 2021-01-26 DIAGNOSIS — E66.01 MORBID OBESITY WITH BMI OF 60.0-69.9, ADULT (HCC): ICD-10-CM

## 2021-01-26 PROCEDURE — 99214 OFFICE O/P EST MOD 30 MIN: CPT | Performed by: PAIN MEDICINE

## 2021-01-26 PROCEDURE — 99213 OFFICE O/P EST LOW 20 MIN: CPT

## 2021-01-26 RX ORDER — ACETAMINOPHEN AND CODEINE PHOSPHATE 300; 30 MG/1; MG/1
1 TABLET ORAL EVERY 8 HOURS PRN
Qty: 90 TABLET | Refills: 0 | Status: SHIPPED | OUTPATIENT
Start: 2021-01-26 | End: 2021-02-25

## 2021-01-26 ASSESSMENT — ENCOUNTER SYMPTOMS
EYE DISCHARGE: 0
VOICE CHANGE: 0

## 2021-03-02 ENCOUNTER — HOSPITAL ENCOUNTER (OUTPATIENT)
Dept: PAIN MANAGEMENT | Age: 51
Discharge: HOME | End: 2021-03-02
Payer: COMMERCIAL

## 2021-03-02 DIAGNOSIS — M25.562 BILATERAL CHRONIC KNEE PAIN: ICD-10-CM

## 2021-03-02 DIAGNOSIS — M25.561 BILATERAL CHRONIC KNEE PAIN: ICD-10-CM

## 2021-03-02 DIAGNOSIS — G89.29 CHRONIC PAIN OF BOTH KNEES: ICD-10-CM

## 2021-03-02 DIAGNOSIS — Z79.899 MEDICATION MANAGEMENT: ICD-10-CM

## 2021-03-02 DIAGNOSIS — M17.0 PRIMARY OSTEOARTHRITIS OF BOTH KNEES: Primary | ICD-10-CM

## 2021-03-02 DIAGNOSIS — G89.29 BILATERAL CHRONIC KNEE PAIN: ICD-10-CM

## 2021-03-02 DIAGNOSIS — M25.562 CHRONIC PAIN OF BOTH KNEES: ICD-10-CM

## 2021-03-02 DIAGNOSIS — E66.01 MORBID OBESITY WITH BMI OF 60.0-69.9, ADULT (HCC): ICD-10-CM

## 2021-03-02 DIAGNOSIS — M25.561 CHRONIC PAIN OF BOTH KNEES: ICD-10-CM

## 2021-03-02 PROCEDURE — 99214 OFFICE O/P EST MOD 30 MIN: CPT | Performed by: PAIN MEDICINE

## 2021-03-02 PROCEDURE — 99213 OFFICE O/P EST LOW 20 MIN: CPT

## 2021-03-02 RX ORDER — ACETAMINOPHEN AND CODEINE PHOSPHATE 300; 60 MG/1; MG/1
1 TABLET ORAL EVERY 8 HOURS PRN
Qty: 90 TABLET | Refills: 0 | Status: SHIPPED | OUTPATIENT
Start: 2021-03-02 | End: 2021-04-01

## 2021-03-02 NOTE — PROGRESS NOTES
Trung Serna is a 45 y.o. female evaluated on 3/2/2021. Modality of virtual service provided -via  telephone   Consent:  Patient and/or health care decision maker is aware that that patient may receive a bill for this telephone service, depending on one's insurance coverage, and has provided verbal consent to proceed: Yes    Patient identification was verified at the start of the visit: Yes    Chief complaint: Trung Serna is 45 y.o.,  female, with  with chief complaint of pain involving pain in the knees. .    Referring Diagnosis   M25.519 (ICD-10-CM) - Pain in unspecified shoulder   Patient is complaining of pain involving the knees bilaterally. Patient is diagnosed with lymphedema in her legs are swollen according to patient. She is trying to exercise and do physical therapy but could not do so because of severe pain. Patient reports she is not able to get around because of the severe pain and the pain is worse in her right lower extremity. Patient is requesting a change in her medication. She may need surgery but could not be done because of the lymphedema. Knee Pain   There was no injury mechanism. The pain is present in the right knee and left knee (Right side is worse than the left). The quality of the pain is described as cramping (Real bad pain). The pain is at a severity of 8/10 (5-10). The pain is severe. The pain has been constant since onset. Associated symptoms include muscle weakness and tingling. Associated symptoms comments: Legs are weak and cannot walk. The symptoms are aggravated by movement and weight bearing. Alleviating factors:nothing   Lifestyle changes experienced with pain: Prevents or limits ADLs, Increases w/activity. Increases w/prolonged sitting/standing/walking  Mood changes,depressed  Patient currently unemployed. Physical therapy did not help the pain.     Are you under psychological counseling at present: Yes  Goals for treatment include: Decrease in pain  Enjoy daily and recreational activities, return to previous status. Patient relates current medications are not helping the pain. Patient reports taking pain medications as prescribed, denies obtaining medications from different sources and denies use of illegal drugs. Patient denies side effects from medications like nausea, vomiting, constipation or drowsiness. Is requesting a change in her medication to help the pain      ACTIVITY/SOCIAL/EMOTIONAL:  Sleep Pattern: 7 hours per night.  generally restful sleep  Home Exercises: daily Trying to do home exercises as much as possible but cannot do much because of the pain  Activity:unchanged  Emotional Issues: normal.   Currently seeing a Psychiatrist or Psychologist:  Yes     ADVERSE MEDICATION EFFECTS:   Nausea and vomiting: no   Constipation: no-Undercontrol-: yes  Dizziness/drowsy/sleepy--no  Urinary Retention: no    ABERRANT BEHAVIORS SINCE LAST VISIT  Lost rx/pills:------------------------------------------ no  Taking  medication as prescribed: ----------- yes  Urine Drug Screen ---------------------------------  yes             Date------------------------------------------------- 12/3/2020              Results as expected ---------------------yes    Recent ER visits: -------------------------------------No  Pill count is appropriate: ---------------------------yes   Refills for prescriptions appropriate:---------- yes      Past Medical History:   Diagnosis Date    Asthma     Mild intermittent asthma without complication    Astigmatism, regular 4/12/2017    Bronchitis 2009    Chronic back pain     on 2/18/20 pt is presently on pain mgmnt    Depression 5/1/2014    Diabetes mellitus [facility]     Type II    Frequency of urination     Hyperlipidemia     Hypertension     Increased frequency of urination 8/27/2018    Morbid obesity with body mass index (BMI) of 50.0 to 59.9 in adult ([facility]     Myopia 4/12/2017    OAG (open angle glaucoma) suspect, low risk 5/4/2017    Palpitations     occasional    Schizophrenia ([address]     Sleep apnea since Oct. 2017    CPAP nightly    Urge incontinence of urine 8/27/2018    Wears glasses        Past Surgical History:   Procedure Laterality Date    DILATION AND CURETTAGE OF UTERUS N/A 11/14/2017    OPERATIVE HYSTEROSCOPY WITH MYOSURE, EXCISION ENDOMETRIAL POLYPS performed by Shahla Chow MD at [address] NERV Bilateral 3/25/2019    BILATERAL KNEE FLORO GUIDED STEROID INJECTION performed by Shun North MD at [address] [place] [address] NERV Bilateral 5/13/2019    SYNVISC  MEDICATION INJECTION   FLUORO GUIDED  FABIOLA KNEES performed by Shun North MD at [address] [place] [address] NERV Bilateral 5/20/2019    INJECTION SYNVISC  FLUORO GUIDED FABIOLA KNEES performed by Shun North MD at [address] [place] [address] NERV Bilateral 6/10/2019    INJECTION SYNVISC FLUORO GUIDED FABIOLA KNEES performed by Shun North MD at [address] [place] [address] NERV Bilateral 11/18/2019    INJECTION SYNVISC BILATERAL KNEE performed by Shun North MD at [address] [place] [address]  11/14/2017    exc endometrial polyps    MEDICATION INJECTION N/A 8/17/2020    1ST -U/S GUIDED KNEE SYNVISC INJECTION performed by Shun North MD at [address] N/A 8/24/2020    2ND -U/S GUIDED KNEE SYNVISC INJECTION performed by Shun North MD at [address] N/A 8/31/2020    3RD U/S GUIDED KNEE SYNVISC INJECTION performed by Shun North MD at [address] [place] [address] Right 10/17/14    excision dorsal exostosis rt foot    OTHER SURGICAL HISTORY Bilateral 06/10/2019    INJECTION SYNVISC FLUORO GUIDED FABIOLA KNEES (Bilateral )       Family History   Problem Relation Age of Onset    Diabetes Mother     Heart Failure Mother     Diabetes Maternal Grandmother     Heart Failure Maternal Grandmother     No Known Problems Maternal Grandfather     No Known Problems Paternal Grandmother     No Known Problems Paternal Grandfather     Cancer Paternal Aunt        Social History     Socioeconomic History    Marital status: Single     Spouse name: Not on file    Number of children: 0    Years of education: Not on file    Highest education level: Not on file   Occupational History    Not on file   Social Needs    Financial resource strain: Not on file    Food insecurity     Worry: Not on file     Inability: Not on file   [employer] needs     Medical: Not on file     Non-medical: Not on file   Tobacco Use    Smoking status: Never Smoker    Smokeless tobacco: Never Used    Tobacco comment: associates smoking   Substance and Sexual Activity    Alcohol use: No    Drug use: No    Sexual activity: Never   Lifestyle    Physical activity     Days per week: Not on file     Minutes per session: Not on file    Stress: Not on file   Relationships    Social connections     Talks on phone: Not on file     Gets together: Not on file     Attends Gnosticism service: Not on file     Active member of club or organization: Not on file     Attends meetings of clubs or organizations: Not on file     Relationship status: Not on file    Intimate partner violence     Fear of current or ex partner: Not on file     Emotionally abused: Not on file     Physically abused: Not on file     Forced sexual activity: Not on file   Other Topics Concern    Not on file   Social History Narrative    Not on file       Allergies   Allergen Reactions    Lisinopril      Makes her cough       Current Outpatient Medications on File Prior to Encounter   Medication Sig Dispense Refill    meloxicam (MOBIC) 7.5 MG tablet Take 7.5 mg by mouth daily      lidocaine (LMX) 4 % cream Apply topically as needed      fluticasone-vilanterol (BREO ELLIPTA) 100-25 MCG/INH AEPB inhaler Inhale 1 puff into the lungs daily      diclofenac sodium (VOLTAREN) 1 % GEL Apply 4 g topically 4 times daily 4 Tube 3    Tens Unit MISC by Does not apply route 1 each 0    TRAVATAN Z 0.004 % SOLN ophthalmic solution PLACE 1 DROP IN BOTH EYES EACH NIGHT  1    triamcinolone (KENALOG) 0.025 % cream Apply to rash twice on back of neck twice daily 80 g 2    tretinoin (RETIN-A) 0.025 % cream Apply pea sized amount to face, chest and back nightly 45 g 3    bisacodyl (DULCOLAX) 5 MG EC tablet TAKE 4 TABS AT 10 AM THE DAY PRIOR TO COLONOSCOPY 4 tablet 0    Elastic Bandages & Supports (KNEE BRACE ADJUSTABLE HINGED) MISC Use as directed 1 each 0    Elastic Bandages & Supports (KNEE BRACE) MISC 2 Units by Does not apply route daily 2 each 0    gabapentin (NEURONTIN) 300 MG capsule Take 1 capsule by mouth. .      fluticasone (FLOVENT HFA) 110 MCG/ACT inhaler Inhale 1 puff into the lungs 2 times daily 1 Inhaler 11    albuterol sulfate  (90 Base) MCG/ACT inhaler Inhale 2 puffs into the lungs every 6 hours as needed for Wheezing 1 Inhaler 3    ibuprofen (ADVIL;MOTRIN) 800 MG tablet Take 1 tablet by mouth every 6 hours as needed for Pain 30 tablet 0    DICLOFENAC POTASSIUM PO Take 50 mg by mouth 3 times daily as needed      ARIPiprazole (ABILIFY) 15 MG tablet Take 15 mg by mouth nightly       traZODone (DESYREL) 50 MG tablet Take 50 mg by mouth nightly      pravastatin (PRAVACHOL) 80 MG tablet Take 80 mg by mouth nightly       oxybutynin (DITROPAN-XL) 10 MG extended release tablet Take 10 mg by mouth daily      potassium chloride (MICRO-K) 10 MEQ CR capsule Take 10 mEq by mouth daily.  valsartan (DIOVAN) 40 MG tablet Take 40 mg by mouth nightly Patient takes at night      metFORMIN (GLUCOPHAGE) 500 MG tablet Take 500 mg by mouth daily (with breakfast).       hydrochlorothiazide (MICROZIDE) 12.5 MG capsule Take 12.5 mg by mouth nightly       amLODIPine (NORVASC) 10 MG tablet Take 10 mg by mouth nightly Patient takes at night       No current facility-administered medications on file prior to encounter. Review of Systems   Constitutional: Negative. Negative for activity change, appetite change, fatigue, fever and unexpected weight change. HENT: Negative. Negative for congestion, ear pain, hearing loss, rhinorrhea, sore throat and voice change. Eyes: Negative. Negative for photophobia, pain, discharge and visual disturbance. Respiratory: Positive for shortness of breath and wheezing. Negative for cough. Cardiovascular: Negative. Negative for palpitations and leg swelling. HTN   Gastrointestinal: Negative. Negative for abdominal pain, diarrhea, nausea, rectal pain and vomiting. Endocrine: Negative. Negative for cold intolerance and polyuria. H/ diabetes mellitusborderline   Genitourinary: Negative. Negative for dysuria and genital sores. Musculoskeletal: Positive for arthralgias and joint swelling. Skin: Negative. Negative for rash. Allergic/Immunologic: Negative. Negative for immunocompromised state. Neurological: Positive for tingling. Negative for seizures and weakness. Hematological: Does not bruise/bleed easily. Psychiatric/Behavioral: Negative for hallucinations, self-injury, sleep disturbance and suicidal ideas. The patient is nervous/anxious. The patient is not hyperactive. Is no change in her review of systems since her last visit  Physical Exam  Neurological:      Mental Status: She is alert and oriented to person, place, and time.    Psychiatric:         Mood and Affect: Mood normal.            DATA:  LAB.:  12/3/2020  2:41 PM - Linda Condon Incoming Lab Results From [facility]    Component Value Ref Range & Units Status Collected Lab   Pain Management Drug Panel Interp, Urine Consistent   Final 11/30/2020  4:37 PM [facility]   (NOTE)   ________________________________________________________________   DRUGS EXPECTED:   NO DRUGS EXPECTED   ________________________________________________________________   CONSISTENT with medications Reviewed PDMP [1]     Counselling/Preventive measures for pain  Control:    [x]  Spine strengthening exercises are discussed with patient in detail. [x] Ill effects of being on chronic pain medications such as sleep disturbances, hormonal changes, withdrawal symptoms,  chronic opioid dependence and tolerance were discussed with patient. I had asked the patient to minimize medication use and utilize pain medications only for uncontrolled rest pain or pain with exertional activities. I advised patient not to self escalate pain medications without consulting with us. At each of patient's future visits we will try to taper pain medications, while adjusting the adjunct medications, and re-evaluating for Physical Therapy to improve spinal and joint strength. We will continue to have discussions to decrease pain medications as tolerated. I also discussed with the patient regarding the dangers of combining narcotic pain medication with tranquilizers, alcohol or illegal drugs or taking the medication any other than prescribed. The dangers including the respiratory depression and death. Patient was told to tell  to all  physicians regarding the medications he is getting from pain clinic. Patient is warned not to take any unprescribed medications over-the-counter medications that can depress breathing . Patient is advised to talk to the pharmacist or physicians if planning to take any over-the-counter medications before  takeing them. Patient is strongly advised to avoid tranquilizers or  Relaxants for any medications that can depress breathing or recreational drugs. Patient is also advised to tell us if there is any changes in his medications from other physicians. We discussed the same at today's visit and have not been to implement it, as the patient's pain is not under control with current medications.      Decision Making Process : Patient's health history and referral records thoroughly reviewed before focused communicated with the patient and/or health care decision maker about plan of care  Details of this discussion including any medical advice provided: Total Time: minutes: 21-30 minutes    I affirm this is a Patient Initiated Episode with an Established Patient who has not had a related appointment within my department in the past 7 days or scheduled within the next 24 hours.     Electronically signed by Edna Hernandez MD on 3/4/2021 at 5:27 AM

## 2021-03-04 ASSESSMENT — ENCOUNTER SYMPTOMS
SORE THROAT: 0
GASTROINTESTINAL NEGATIVE: 1
ALLERGIC/IMMUNOLOGIC NEGATIVE: 1
RECTAL PAIN: 0
RHINORRHEA: 0
ABDOMINAL PAIN: 0
NAUSEA: 0
EYE PAIN: 0
SHORTNESS OF BREATH: 1
VOICE CHANGE: 0
EYE DISCHARGE: 0
COUGH: 0
EYES NEGATIVE: 1
WHEEZING: 1
VOMITING: 0
PHOTOPHOBIA: 0
DIARRHEA: 0

## 2021-07-04 ASSESSMENT — ENCOUNTER SYMPTOMS
RECTAL PAIN: 0
EYES NEGATIVE: 1
ABDOMINAL PAIN: 0
GASTROINTESTINAL NEGATIVE: 1
ALLERGIC/IMMUNOLOGIC NEGATIVE: 1
VOICE CHANGE: 0
WHEEZING: 1
VOMITING: 0
DIARRHEA: 0
COUGH: 0
SORE THROAT: 0
EYE PAIN: 0
SHORTNESS OF BREATH: 1
EYE DISCHARGE: 0
NAUSEA: 0
RHINORRHEA: 0
PHOTOPHOBIA: 0

## 2021-07-04 NOTE — PROGRESS NOTES
Ary Ingram is a 45 y.o. female evaluated on 7/7/2021. Modality of virtual service provided -via telephone   Consent:  Patient and/or health care decision maker is aware that that patient may receive a bill for this telephone service, depending on one's insurance coverage, and has provided verbal consent to proceed: Yes    Patient identification was verified at the start of the visit: Yes    Chief complaint: Ary Ingram is 45 y.o.,  female, with  with chief complaint of pain involving knees bilaterally. .    Referring Diagnosis   M25.519 (ICD-10-CM) - Pain in unspecified shoulder     Patient is a 70-year-old female referred to the pain clinic in consultation for pain involving the knees bilaterally. Patient had bilateral lymphedema and also has severe osteoarthritis in the knees. Patient cannot have surgery because of the lymphedema. Patient was last seen in the pain clinic in March 2021 at which time she was given Tylenol No. 4.  She reports Tylenol No. 4 is not working and she is requesting Percocet. Patient reports her knee pain is worse when she moves around and gets better when she is at rest.  The pain is intermittent and brought on with activity when her pain goes up to 7-8 on a scale of 0-10. Knee Pain   The incident occurred more than 1 week ago. There was no injury mechanism. The pain is present in the right knee and left knee (Right side is worse than the left). The quality of the pain is described as cramping (Real bad pain). The pain is at a severity of 7/10 (3-9). The pain is severe. The pain has been constant since onset. Associated symptoms include muscle weakness and tingling. Associated symptoms comments: Legs are weak and cannot walk. The symptoms are aggravated by movement and weight bearing. Alleviating factors:rest   Lifestyle changes experienced with pain: Prevents or limits ADLs, Increases w/activity.    Increases w/prolonged sitting/standing/walking  Mood changes,none  Patient currently unemployed. Physical therapy did not help the pain much    Are you under psychological counseling at present: Yes  Goals for treatment include:  Decrease in pain  Enjoy daily and recreational activities, return to previous status. Patient relates current medications are helping the pain. Patient reports taking pain medications as prescribed, denies obtaining medications from different sources and denies use of illegal drugs. Patient denies side effects from medications like nausea, vomiting, constipation or drowsiness. Patient reports current activities of daily living ar possible due to medications and would like to continue them. ACTIVITY/SOCIAL/EMOTIONAL:  Sleep Pattern: 8 hours per night. generally restful sleep  Home Exercises:  walking and Stretching  Activity:unchanged  Emotional Issues: Depression and schizophrenia.    Currently seeing a Psychiatrist or Psychologist:  Yes     ADVERSE MEDICATION EFFECTS:   Nausea and vomiting: no   Constipation: no-Undercontrol-: yes  Dizziness/drowsy/sleepy--no  Urinary Retention: no    ABERRANT BEHAVIORS SINCE LAST VISIT  Lost rx/pills:-------------------------------------NA  Taking  medication as prescribed: ----------- not applicable  Urine Drug Screen ---------------------------------  yes             Date------------------------------------------------- 11/30/2017 (patient was asked to come for urine screen on November 17th but had it done on 11/30/2021         results as expected ---------------------yes    Recent ER visits: -------------------------------------No  Pill count is appropriate: ---------------------------yes   Refills for prescriptions appropriate:---------- not applicable      Past Medical History:   Diagnosis Date    Asthma     Mild intermittent asthma without complication    Astigmatism, regular 4/12/2017    Bronchitis 2009    Chronic back pain     on 2/18/20 pt is presently on pain mgmnt    Depression 5/1/2014    Diabetes mellitus ([facility]     Type II    Frequency of urination     Hyperlipidemia     Hypertension     Increased frequency of urination 8/27/2018    Morbid obesity with body mass index (BMI) of 50.0 to 59.9 in adult ([facility] [address]     Myopia 4/12/2017    OAG (open angle glaucoma) suspect, low risk 5/4/2017    Palpitations     occasional    Schizophrenia ([facility] [address] [facility]     Sleep apnea since Oct. 2017    CPAP nightly    Urge incontinence of urine 8/27/2018    Wears glasses        Past Surgical History:   Procedure Laterality Date    DILATION AND CURETTAGE OF UTERUS N/A 11/14/2017    OPERATIVE HYSTEROSCOPY WITH MYOSURE, EXCISION ENDOMETRIAL POLYPS performed by Sakshi Angel MD at [address] NERV Bilateral 3/25/2019    BILATERAL KNEE FLORO GUIDED STEROID INJECTION performed by Sheri Osullivan MD at [address] [place] [address] NERV Bilateral 5/13/2019    SYNVISC  MEDICATION INJECTION   FLUORO GUIDED  FABIOLA KNEES performed by Sheri Osullivan MD at [address] [place] [address] NERV Bilateral 5/20/2019    INJECTION SYNVISC  FLUORO GUIDED FABIOLA KNEES performed by Sheri Osullivan MD at [address] [place] [address] NERV Bilateral 6/10/2019    INJECTION SYNVISC FLUORO GUIDED FABIOLA KNEES performed by Sheri Osullivan MD at [address] [place] [address] NERV Bilateral 11/18/2019    INJECTION SYNVISC BILATERAL KNEE performed by Sheri Osullivan MD at [address] [place] [address]  11/14/2017    exc endometrial polyps    MEDICATION INJECTION N/A 8/17/2020    1ST -U/S GUIDED KNEE SYNVISC INJECTION performed by Sheri Osullivan MD at [address] [place] [address] 8/24/2020    2ND -U/S GUIDED KNEE SYNVISC INJECTION performed by Sheri Osullivan MD at [address] [place] [address] 8/31/2020    3RD U/S GUIDED KNEE SYNVISC INJECTION performed by Sheri Osullivan MD at [address] [facility] 10/17/14    excision dorsal exostosis rt foot    OTHER SURGICAL HISTORY Bilateral 06/10/2019    INJECTION SYNVISC FLUORO GUIDED FABIOLA KNEES (Bilateral )       Family History   Problem Relation Age of Onset    Diabetes Mother     Heart Failure Mother     Diabetes Maternal Grandmother     Heart Failure Maternal Grandmother     No Known Problems Maternal Grandfather     No Known Problems Paternal Grandmother     No Known Problems Paternal Grandfather     Cancer Paternal Aunt        Social History     Socioeconomic History    Marital status: Single     Spouse name: None    Number of children: 0    Years of education: None    Highest education level: None   Occupational History    None   Tobacco Use    Smoking status: Never Smoker    Smokeless tobacco: Never Used    Tobacco comment: associates smoking   Vaping Use    Vaping Use: Never used   Substance and Sexual Activity    Alcohol use: No    Drug use: No    Sexual activity: Never   Other Topics Concern    None   Social History Narrative    None     Social Determinants of Health     Financial Resource Strain:     Difficulty of Paying Living Expenses:    Food Insecurity:     Worried About Running Out of Food in the Last Year:     Ran Out of Food in the Last Year:    Transportation Needs:     Lack of Transportation (Medical):  Lack of Transportation (Non-Medical):    Physical Activity:     Days of Exercise per Week:     Minutes of Exercise per Session:    Stress:     Feeling of Stress :    Social Connections:     Frequency of Communication with Friends and Family:     Frequency of Social Gatherings with Friends and Family:     Attends Yarsanism Services:     Active Member of Clubs or Organizations:     Attends Club or Organization Meetings:     Marital Status:    Intimate Partner Violence:     Fear of Current or Ex-Partner:     Emotionally Abused:     Physically Abused:     Sexually Abused:         Allergies   Allergen Reactions    Lisinopril      Makes her cough       Current Outpatient Medications on File Prior to Encounter   Medication Sig Dispense Refill    meloxicam (MOBIC) 7.5 MG tablet Take 7.5 mg by mouth daily      lidocaine (LMX) 4 % cream Apply topically as needed      fluticasone-vilanterol (BREO ELLIPTA) 100-25 MCG/INH AEPB inhaler Inhale 1 puff into the lungs daily      diclofenac sodium (VOLTAREN) 1 % GEL Apply 4 g topically 4 times daily 4 Tube 3    Tens Unit MISC by Does not apply route 1 each 0    TRAVATAN Z 0.004 % SOLN ophthalmic solution PLACE 1 DROP IN BOTH EYES EACH NIGHT  1    triamcinolone (KENALOG) 0.025 % cream Apply to rash twice on back of neck twice daily 80 g 2    tretinoin (RETIN-A) 0.025 % cream Apply pea sized amount to face, chest and back nightly 45 g 3    bisacodyl (DULCOLAX) 5 MG EC tablet TAKE 4 TABS AT 10 AM THE DAY PRIOR TO COLONOSCOPY 4 tablet 0    Elastic Bandages & Supports (KNEE BRACE ADJUSTABLE HINGED) MISC Use as directed 1 each 0    Elastic Bandages & Supports (KNEE BRACE) MISC 2 Units by Does not apply route daily 2 each 0    gabapentin (NEURONTIN) 300 MG capsule Take 1 capsule by mouth. .      fluticasone (FLOVENT HFA) 110 MCG/ACT inhaler Inhale 1 puff into the lungs 2 times daily 1 Inhaler 11    albuterol sulfate  (90 Base) MCG/ACT inhaler Inhale 2 puffs into the lungs every 6 hours as needed for Wheezing 1 Inhaler 3    ibuprofen (ADVIL;MOTRIN) 800 MG tablet Take 1 tablet by mouth every 6 hours as needed for Pain 30 tablet 0    DICLOFENAC POTASSIUM PO Take 50 mg by mouth 3 times daily as needed      ARIPiprazole (ABILIFY) 15 MG tablet Take 15 mg by mouth nightly       traZODone (DESYREL) 50 MG tablet Take 50 mg by mouth nightly      pravastatin (PRAVACHOL) 80 MG tablet Take 80 mg by mouth nightly       oxybutynin (DITROPAN-XL) 10 MG extended release tablet Take 10 mg by mouth daily      potassium chloride (MICRO-K) 10 MEQ CR capsule Take 10 mEq by mouth daily.       valsartan (DIOVAN) 40 MG tablet Take 40 mg by mouth nightly Patient takes at night      metFORMIN (GLUCOPHAGE) 500 MG tablet Take 500 mg by mouth daily (with breakfast).  hydrochlorothiazide (MICROZIDE) 12.5 MG capsule Take 12.5 mg by mouth nightly       amLODIPine (NORVASC) 10 MG tablet Take 10 mg by mouth nightly Patient takes at night       No current facility-administered medications on file prior to encounter. Review of Systems   Constitutional: Negative. Negative for activity change, appetite change, fatigue, fever and unexpected weight change. HENT: Negative. Negative for congestion, ear pain, hearing loss, rhinorrhea, sore throat and voice change. Eyes: Negative. Negative for photophobia, pain, discharge and visual disturbance. Respiratory: Positive for shortness of breath and wheezing. Negative for cough. Cardiovascular: Negative. Negative for palpitations and leg swelling. HTN   Gastrointestinal: Negative. Negative for abdominal pain, diarrhea, nausea, rectal pain and vomiting. Endocrine: Negative. Negative for cold intolerance and polyuria. H/ diabetes mellitusborderline   Genitourinary: Negative. Negative for dysuria and genital sores. Musculoskeletal: Positive for arthralgias and joint swelling. Skin: Negative. Negative for rash. Allergic/Immunologic: Negative. Negative for immunocompromised state. Neurological: Positive for tingling. Negative for seizures and weakness. Hematological: Does not bruise/bleed easily. Psychiatric/Behavioral: Negative for hallucinations, self-injury, sleep disturbance and suicidal ideas. The patient is nervous/anxious. The patient is not hyperactive. Physical Exam  Skin:         Neurological:      Mental Status: She is alert and oriented to person, place, and time.    Psychiatric:         Mood and Affect: Mood normal.            DATA:  LAB.:   12/3/2020  2:41 PM - Linda Condon Incoming Lab Results From [facility]    Component Value Ref Range & Units Status Collected Lab   Pain Management Drug Panel Interp, Urine Consistent   Final 11/30/2020  4:37 PM [facility]   (NOTE)   ________________________________________________________________   DRUGS EXPECTED:   NO DRUGS EXPECTED   ________________________________________________________________   CONSISTENT with medications provided:   NO DRUGS DETECTED        X-Ray reports:     3 XRAY VIEWS OF THE LEFT KNEE; 3 XRAY VIEWS OF THE RIGHT KNEE        3/9/2019 12:39 pm        COMPARISON:    None        HISTORY:    ORDERING SYSTEM PROVIDED HISTORY: Arthritis of both knees    TECHNOLOGIST PROVIDED HISTORY:    Acuity: Chronic    Type of Exam: Unknown        FINDINGS:    Left knee: There is joint space narrowing most pronounced medially with a slight genu    varus deformity. Osteophyte formation is seen about all three compartments. No joint effusion or fracture is seen. Right knee:        Joint space narrowing is most pronounced medially and there is a genu varus    deformity. Osteophyte formation is seen about all three compartments. No    joint effusion or fracture is seen. Impression    Moderate to severe tricompartmental osteoarthritis of the knees with    bilateral genu varus deformities. 3 XRAY VIEWS OF THE LEFT KNEE; 3 XRAY VIEWS OF THE RIGHT KNEE        3/9/2019 12:39 pm        COMPARISON:    None        HISTORY:    ORDERING SYSTEM PROVIDED HISTORY: Arthritis of both knees    TECHNOLOGIST PROVIDED HISTORY:    Acuity: Chronic    Type of Exam: Unknown        FINDINGS:    Left knee: There is joint space narrowing most pronounced medially with a slight genu    varus deformity. Osteophyte formation is seen about all three compartments. No joint effusion or fracture is seen. Right knee:        Joint space narrowing is most pronounced medially and there is a genu varus    deformity. Osteophyte formation is seen about all three compartments.   No    joint effusion options. Level of diagnosis and management options of this case is multiple: involving the following management options: Interventions as needed, medication management as appropriate, future visits, activity modification, heat/ice as needed, Urine drug screen as required. [x]The patient's questions were answered to the best of my abilities. This note was created using voice recognition software. There may be inaccuracies of transcription  that are inadvertently overlooked prior to the signature. There is any questions about the transcription please contact me. Return in  4 weeks  with physician / CNP  for further plan of treatment. Due to the COVID-19 pandemic and the appropriate interventions by Scottie Hooks, our non-urgent pain management patients will not be seen in the office at this time for their protection and the protection of our staff. To offer continuity of care, their prescriptions will be escribed this month after a careful chart review and review of their OARRS report  Pursuant to the emergency declaration under the [employer] and [facility] waiver authority and the [employer] and [employer] Act, this Virtual Visit was conducted, with patient's consent, to reduce the patient's risk of exposure to COVID-19 and provide continuity of care for an established patient. Services were provided through a video synchronous discussion virtually to substitute for in-person appointment. \"  Documentation:  I communicated with the patient and/or health care decision maker about plan of care  Details of this discussion including any medical advice provided: Total Time: minutes: 11-20 minutes    I affirm this is a Patient Initiated Episode with an Established Patient who has not had a related appointment within my department in the past 7 days or scheduled within the next 24 hours.     Electronically signed by Tigre Callejas MD on 7/7/2021 at 11:38 AM

## 2021-07-07 ENCOUNTER — HOSPITAL ENCOUNTER (OUTPATIENT)
Dept: PAIN MANAGEMENT | Age: 51
Discharge: HOME | End: 2021-07-07
Payer: COMMERCIAL

## 2021-07-07 DIAGNOSIS — M25.561 BILATERAL CHRONIC KNEE PAIN: ICD-10-CM

## 2021-07-07 DIAGNOSIS — E66.01 MORBID OBESITY WITH BMI OF 60.0-69.9, ADULT (HCC): ICD-10-CM

## 2021-07-07 DIAGNOSIS — E28.2 PCOS (POLYCYSTIC OVARIAN SYNDROME): ICD-10-CM

## 2021-07-07 DIAGNOSIS — Z79.899 MEDICATION MANAGEMENT: ICD-10-CM

## 2021-07-07 DIAGNOSIS — M25.562 BILATERAL CHRONIC KNEE PAIN: ICD-10-CM

## 2021-07-07 DIAGNOSIS — Z51.81 ENCOUNTER FOR MEDICATION MONITORING: ICD-10-CM

## 2021-07-07 DIAGNOSIS — M17.0 PRIMARY OSTEOARTHRITIS OF BOTH KNEES: Primary | ICD-10-CM

## 2021-07-07 DIAGNOSIS — G89.29 BILATERAL CHRONIC KNEE PAIN: ICD-10-CM

## 2021-07-07 PROCEDURE — 99213 OFFICE O/P EST LOW 20 MIN: CPT

## 2021-07-07 PROCEDURE — 99442 PR PHYS/QHP TELEPHONE EVALUATION 11-20 MIN: CPT | Performed by: PAIN MEDICINE

## 2021-09-17 ENCOUNTER — HOSPITAL ENCOUNTER (OUTPATIENT)
Dept: CT IMAGING | Facility: CLINIC | Age: 51
Discharge: HOME | End: 2021-09-19
Payer: COMMERCIAL

## 2021-09-17 ENCOUNTER — HOSPITAL ENCOUNTER (EMERGENCY)
Age: 51
Discharge: HOME | End: 2021-09-17
Attending: EMERGENCY MEDICINE
Payer: COMMERCIAL

## 2021-09-17 VITALS
OXYGEN SATURATION: 99 % | SYSTOLIC BLOOD PRESSURE: 104 MMHG | BODY MASS INDEX: 48.82 KG/M2 | TEMPERATURE: 98.8 F | RESPIRATION RATE: 20 BRPM | DIASTOLIC BLOOD PRESSURE: 61 MMHG | WEIGHT: 293 LBS | HEIGHT: 65 IN | HEART RATE: 76 BPM

## 2021-09-17 DIAGNOSIS — E87.6 HYPOKALEMIA: Primary | ICD-10-CM

## 2021-09-17 DIAGNOSIS — R22.1 NECK MASS: ICD-10-CM

## 2021-09-17 DIAGNOSIS — M54.2 NECK PAIN: ICD-10-CM

## 2021-09-17 LAB
ABSOLUTE EOS #: 0.28 K/UL (ref 0–0.44)
ABSOLUTE IMMATURE GRANULOCYTE: 0.04 K/UL (ref 0–0.3)
ABSOLUTE LYMPH #: 2.62 K/UL (ref 1.1–3.7)
ABSOLUTE MONO #: 0.63 K/UL (ref 0.1–1.2)
ANION GAP SERPL CALCULATED.3IONS-SCNC: 10 MMOL/L (ref 9–17)
BASOPHILS # BLD: 1 % (ref 0–2)
BASOPHILS ABSOLUTE: 0.07 K/UL (ref 0–0.2)
BUN BLDV-MCNC: 11 MG/DL (ref 6–20)
BUN BLDV-MCNC: 12 MG/DL (ref 6–20)
BUN/CREAT BLD: 13 (ref 9–20)
CALCIUM SERPL-MCNC: 9.4 MG/DL (ref 8.6–10.4)
CHLORIDE BLD-SCNC: 97 MMOL/L (ref 98–107)
CO2: 30 MMOL/L (ref 20–31)
CREAT SERPL-MCNC: 0.8 MG/DL (ref 0.5–0.9)
CREAT SERPL-MCNC: 0.87 MG/DL (ref 0.5–0.9)
DIFFERENTIAL TYPE: ABNORMAL
EOSINOPHILS RELATIVE PERCENT: 4 % (ref 1–4)
GFR AFRICAN AMERICAN: >60 ML/MIN
GFR AFRICAN AMERICAN: >60 ML/MIN
GFR NON-AFRICAN AMERICAN: >60 ML/MIN
GFR NON-AFRICAN AMERICAN: >60 ML/MIN
GFR SERPL CREATININE-BSD FRML MDRD: ABNORMAL ML/MIN/{1.73_M2}
GFR SERPL CREATININE-BSD FRML MDRD: ABNORMAL ML/MIN/{1.73_M2}
GFR SERPL CREATININE-BSD FRML MDRD: NORMAL ML/MIN/{1.73_M2}
GFR SERPL CREATININE-BSD FRML MDRD: NORMAL ML/MIN/{1.73_M2}
GLUCOSE BLD-MCNC: 119 MG/DL (ref 65–105)
GLUCOSE BLD-MCNC: 82 MG/DL (ref 70–99)
HCT VFR BLD CALC: 40.1 % (ref 36.3–47.1)
HEMOGLOBIN: 12.5 G/DL (ref 11.9–15.1)
IMMATURE GRANULOCYTES: 1 %
LYMPHOCYTES # BLD: 34 % (ref 24–43)
MCH RBC QN AUTO: 27.5 PG (ref 25.2–33.5)
MCHC RBC AUTO-ENTMCNC: 31.2 G/DL (ref 28.4–34.8)
MCV RBC AUTO: 88.3 FL (ref 82.6–102.9)
MONOCYTES # BLD: 8 % (ref 3–12)
NRBC AUTOMATED: 0 PER 100 WBC
PDW BLD-RTO: 15 % (ref 11.8–14.4)
PLATELET # BLD: 282 K/UL (ref 138–453)
PLATELET ESTIMATE: ABNORMAL
PMV BLD AUTO: 9.6 FL (ref 8.1–13.5)
POTASSIUM SERPL-SCNC: 3 MMOL/L (ref 3.7–5.3)
RBC # BLD: 4.54 M/UL (ref 3.95–5.11)
RBC # BLD: ABNORMAL 10*6/UL
SEG NEUTROPHILS: 52 % (ref 36–65)
SEGMENTED NEUTROPHILS ABSOLUTE COUNT: 4.02 K/UL (ref 1.5–8.1)
SODIUM BLD-SCNC: 137 MMOL/L (ref 135–144)
WBC # BLD: 7.7 K/UL (ref 3.5–11.3)
WBC # BLD: ABNORMAL 10*3/UL

## 2021-09-17 PROCEDURE — 70491 CT SOFT TISSUE NECK W/DYE: CPT

## 2021-09-17 PROCEDURE — 2580000003 HC RX 258: Performed by: NURSE PRACTITIONER

## 2021-09-17 PROCEDURE — 36415 COLL VENOUS BLD VENIPUNCTURE: CPT

## 2021-09-17 PROCEDURE — 99283 EMERGENCY DEPT VISIT LOW MDM: CPT

## 2021-09-17 PROCEDURE — 80048 BASIC METABOLIC PNL TOTAL CA: CPT

## 2021-09-17 PROCEDURE — 6370000000 HC RX 637 (ALT 250 FOR IP): Performed by: NURSE PRACTITIONER

## 2021-09-17 PROCEDURE — 82565 ASSAY OF CREATININE: CPT

## 2021-09-17 PROCEDURE — 82947 ASSAY GLUCOSE BLOOD QUANT: CPT

## 2021-09-17 PROCEDURE — 85025 COMPLETE CBC W/AUTO DIFF WBC: CPT

## 2021-09-17 PROCEDURE — 6360000004 HC RX CONTRAST MEDICATION: Performed by: NURSE PRACTITIONER

## 2021-09-17 PROCEDURE — 84520 ASSAY OF UREA NITROGEN: CPT

## 2021-09-17 RX ORDER — HYDROXYZINE PAMOATE 25 MG/1
25 CAPSULE ORAL DAILY
COMMUNITY

## 2021-09-17 RX ORDER — OMEPRAZOLE 20 MG/1
20 CAPSULE, DELAYED RELEASE ORAL DAILY
COMMUNITY

## 2021-09-17 RX ORDER — POTASSIUM CHLORIDE 20 MEQ/1
20 TABLET, EXTENDED RELEASE ORAL DAILY
Qty: 5 TABLET | Refills: 0 | Status: SHIPPED | OUTPATIENT
Start: 2021-09-17 | End: 2021-09-22

## 2021-09-17 RX ORDER — LATANOPROST 50 UG/ML
1 SOLUTION/ DROPS OPHTHALMIC NIGHTLY
COMMUNITY

## 2021-09-17 RX ORDER — 0.9 % SODIUM CHLORIDE 0.9 %
70 INTRAVENOUS SOLUTION INTRAVENOUS ONCE
Status: COMPLETED | OUTPATIENT
Start: 2021-09-17 | End: 2021-09-17

## 2021-09-17 RX ORDER — FUROSEMIDE 20 MG/1
20 TABLET ORAL DAILY PRN
COMMUNITY

## 2021-09-17 RX ORDER — METOLAZONE 2.5 MG/1
2.5 TABLET ORAL DAILY
COMMUNITY

## 2021-09-17 RX ORDER — SODIUM CHLORIDE 0.9 % (FLUSH) 0.9 %
10 SYRINGE (ML) INJECTION PRN
Status: DISCONTINUED | OUTPATIENT
Start: 2021-09-17 | End: 2021-09-20 | Stop reason: HOSPADM

## 2021-09-17 RX ORDER — ACETAMINOPHEN 160 MG
TABLET,DISINTEGRATING ORAL DAILY
COMMUNITY

## 2021-09-17 RX ADMIN — POTASSIUM BICARBONATE 40 MEQ: 391 TABLET, EFFERVESCENT ORAL at 22:07

## 2021-09-17 RX ADMIN — IOPAMIDOL 75 ML: 755 INJECTION, SOLUTION INTRAVENOUS at 13:27

## 2021-09-17 RX ADMIN — Medication 10 ML: at 13:28

## 2021-09-17 RX ADMIN — SODIUM CHLORIDE 70 ML: 9 INJECTION, SOLUTION INTRAVENOUS at 13:28

## 2021-09-17 ASSESSMENT — ENCOUNTER SYMPTOMS
COUGH: 0
VOMITING: 0
SHORTNESS OF BREATH: 0
DIARRHEA: 0
NAUSEA: 0
ABDOMINAL PAIN: 0
COLOR CHANGE: 0

## 2021-09-18 NOTE — ED PROVIDER NOTES
eMERGENCY dEPARTMENT eNCOUnter   [address] [place] Name: Ena Carr  MRN: [id number]  [place] 1970  Date of evaluation: 9/17/21     Ena Carr is a 45 y.o. female with CC: Abnormal Lab (low K+), Hyperglycemia (triage ), and Leg Swelling (bilat)      Based on the medical record the care appears appropriate. I was personally available for consultation in the Emergency Department.     Nasrin Mai DO  Attending Emergency Physician                    Luwana Favre, DO  09/17/21 1421

## 2021-09-18 NOTE — ED PROVIDER NOTES
[address] ED  eMERGENCY dEPARTMENT eNCOUnter      Pt Name: Nano Mcclure  MRN: [id number]  [place] 1970  Date of evaluation: 9/17/2021  Provider: SINTIA Reinoso [id number]       Chief Complaint   Patient presents with    Abnormal Lab     low K+    Hyperglycemia     triage     Leg Swelling     bilat         HISTORY OF PRESENT ILLNESS  (Location/Symptom, Timing/Onset, Context/Setting, Quality, Duration, Modifying Factors, Severity.)   Nano Mcclure is a 45 y.o. female who presents to the emergency department via private auto. States she had lab work drawn earlier today. She was told to come to the ED due to an elevated blood sugar and low potassium level. States she is feeling well. Denies HA, vision changes, dizziness. Denies chest pain, SOB, palpitations. Denies N/V/D. Rates her pain 0/10 at this time. Nursing Notes were reviewed.     ALLERGIES     Lisinopril    CURRENT MEDICATIONS       Previous Medications    ALBUTEROL SULFATE  (90 BASE) MCG/ACT INHALER    Inhale 2 puffs into the lungs every 6 hours as needed for Wheezing    AMLODIPINE (NORVASC) 10 MG TABLET    Take 10 mg by mouth nightly Patient takes at night    ARIPIPRAZOLE (ABILIFY) 15 MG TABLET    Take 15 mg by mouth nightly     BISACODYL (DULCOLAX) 5 MG EC TABLET    TAKE 4 TABS AT 10 AM THE DAY PRIOR TO COLONOSCOPY    CHOLECALCIFEROL (VITAMIN D3) 50 MCG (2000 UT) CAPS    Take by mouth daily    DICLOFENAC POTASSIUM PO    Take 50 mg by mouth 3 times daily as needed    DICLOFENAC SODIUM (VOLTAREN) 1 % GEL    Apply 4 g topically 4 times daily    ELASTIC BANDAGES & SUPPORTS (KNEE BRACE ADJUSTABLE HINGED) MISC    Use as directed    ELASTIC BANDAGES & SUPPORTS (KNEE BRACE) MISC    2 Units by Does not apply route daily    FLUTICASONE (FLOVENT HFA) 110 MCG/ACT INHALER    Inhale 1 puff into the lungs 2 times daily    FLUTICASONE-VILANTEROL (BREO ELLIPTA) 100-25 MCG/INH AEPB INHALER    Inhale 1 puff into the lungs daily    FUROSEMIDE (LASIX) 20 MG TABLET    Take 20 mg by mouth daily as needed 1/2 tab    GABAPENTIN (NEURONTIN) 300 MG CAPSULE    Take 1 capsule by mouth. Racheal Iriwn HYDROCHLOROTHIAZIDE (MICROZIDE) 12.5 MG CAPSULE    Take 12.5 mg by mouth nightly     HYDROXYZINE (VISTARIL) 25 MG CAPSULE    Take 25 mg by mouth daily    IBUPROFEN (ADVIL;MOTRIN) 800 MG TABLET    Take 1 tablet by mouth every 6 hours as needed for Pain    LATANOPROST (XALATAN) 0.005 % OPHTHALMIC SOLUTION    1 drop nightly    LIDOCAINE (LMX) 4 % CREAM    Apply topically as needed    MELOXICAM (MOBIC) 7.5 MG TABLET    Take 7.5 mg by mouth daily    METFORMIN (GLUCOPHAGE) 500 MG TABLET    Take 500 mg by mouth daily (with breakfast). METOLAZONE (ZAROXOLYN) 2.5 MG TABLET    Take 2.5 mg by mouth daily    OMEPRAZOLE (PRILOSEC) 20 MG DELAYED RELEASE CAPSULE    Take 20 mg by mouth daily    OXYBUTYNIN (DITROPAN-XL) 10 MG EXTENDED RELEASE TABLET    Take 10 mg by mouth daily    POTASSIUM CHLORIDE (MICRO-K) 10 MEQ CR CAPSULE    Take 10 mEq by mouth daily.     PRAVASTATIN (PRAVACHOL) 80 MG TABLET    Take 80 mg by mouth nightly     TENS UNIT MISC    by Does not apply route    TRAVATAN Z 0.004 % SOLN OPHTHALMIC SOLUTION    PLACE 1 DROP IN BOTH EYES EACH NIGHT    TRAZODONE (DESYREL) 50 MG TABLET    Take 50 mg by mouth nightly    TRETINOIN (RETIN-A) 0.025 % CREAM    Apply pea sized amount to face, chest and back nightly    TRIAMCINOLONE (KENALOG) 0.025 % CREAM    Apply to rash twice on back of neck twice daily    VALSARTAN (DIOVAN) 40 MG TABLET    Take 40 mg by mouth nightly Patient takes at night       PAST MEDICAL HISTORY         Diagnosis Date    Asthma     Mild intermittent asthma without complication    Astigmatism, regular 4/12/2017    Bronchitis 2009    Chronic back pain     on 2/18/20 pt is presently on pain mgmnt    Depression 5/1/2014    Diabetes mellitus ([facility] [address]     Type II    Frequency of urination     Hyperlipidemia     Hypertension     Increased frequency of urination 8/27/2018    Morbid obesity with body mass index (BMI) of 50.0 to 59.9 in adult ([facility] [address]     Myopia 4/12/2017    OAG (open angle glaucoma) suspect, low risk 5/4/2017    Palpitations     occasional    Schizophrenia ([facility] [address]     Sleep apnea since Oct. 2017    CPAP nightly    Urge incontinence of urine 8/27/2018    Wears glasses        SURGICAL HISTORY           Procedure Laterality Date    DILATION AND CURETTAGE OF UTERUS N/A 11/14/2017    OPERATIVE HYSTEROSCOPY WITH MYOSURE, EXCISION ENDOMETRIAL POLYPS performed by Jerod Reece MD at [address] NERV Bilateral 3/25/2019    BILATERAL KNEE FLORO GUIDED STEROID INJECTION performed by Robson Lyon MD at [address] [place] [address] NERV Bilateral 5/13/2019    SYNVISC  MEDICATION INJECTION   FLUORO GUIDED  FABIOLA KNEES performed by Robson Lyon MD at [address] [place] [address] NERV Bilateral 5/20/2019    INJECTION SYNVISC  FLUORO GUIDED FABIOLA KNEES performed by Robson Lyon MD at [address] [place] [address] NERV Bilateral 6/10/2019    INJECTION SYNVISC FLUORO GUIDED FABIOLA KNEES performed by Robson Lyon MD at [address] [place] [address] NERV Bilateral 11/18/2019    INJECTION SYNVISC BILATERAL KNEE performed by Robson Lyon MD at [address] [place] [address]  11/14/2017    exc endometrial polyps    MEDICATION INJECTION N/A 8/17/2020    1ST -U/S GUIDED KNEE SYNVISC INJECTION performed by Robson Lyon MD at [address] [place] [address] 8/24/2020    2ND -U/S GUIDED KNEE SYNVISC INJECTION performed by Robson Lyon MD at [address] [place] [address] 8/31/2020    3RD U/S GUIDED KNEE SYNVISC INJECTION performed by Robson Lyon MD at [address] [place] [address] Right 10/17/14    excision dorsal exostosis rt foot    OTHER SURGICAL HISTORY Bilateral 06/10/2019    INJECTION SYNVISC FLUORO GUIDED FABIOLA KNEES (Bilateral )         FAMILY HISTORY Problem Relation Age of Onset    Diabetes Mother     Heart Failure Mother     Diabetes Maternal Grandmother     Heart Failure Maternal Grandmother     No Known Problems Maternal Grandfather     No Known Problems Paternal Grandmother     No Known Problems Paternal Grandfather     Cancer Paternal Aunt      Family Status   Relation Name Status    Mother      MGM      MGF  (Not Specified)    Father  Alive    Brother 3 Alive    PGM  (Not Specified)    PGF  (Not Specified)    PAunt          SOCIAL HISTORY      reports that she has never smoked. She has never used smokeless tobacco. She reports that she does not drink alcohol and does not use drugs. REVIEW OF SYSTEMS    (2-9 systems for level 4, 10 or more for level 5)     Review of Systems   Constitutional: Negative for chills, diaphoresis, fatigue and fever. Respiratory: Negative for cough and shortness of breath. Cardiovascular: Negative for chest pain and palpitations. Gastrointestinal: Negative for abdominal pain, diarrhea, nausea and vomiting. Musculoskeletal: Negative for arthralgias and myalgias. Skin: Negative for color change, rash and wound. Neurological: Negative for dizziness, weakness, light-headedness and headaches. Except as noted above the remainder of the review of systems was reviewed and negative. PHYSICAL EXAM    (up to 7 for level 4, 8 or more for level 5)     ED Triage Vitals [21 1855]   BP Temp Temp Source Pulse Resp SpO2 Height Weight   104/61 98.8 °F (37.1 °C) Oral 76 20 99 % 5' 5\" (1.651 m) (!) 365 lb (165.6 kg)     Physical Exam  Vitals reviewed. Constitutional:       General: She is not in acute distress. Appearance: She is well-developed. She is not diaphoretic. Eyes:      General: No scleral icterus. Conjunctiva/sclera: Conjunctivae normal.   Neck:      Vascular: No JVD. Cardiovascular:      Rate and Rhythm: Normal rate.    Pulmonary:      Effort: course in the ED, and plan of care upon discharge was discussed in length with the patient. Patient had no further questions prior to being discharged and was instructed to return to the ED for new or worsening symptoms. FINAL IMPRESSION      1. Hypokalemia            Problem List  Patient Active Problem List   Diagnosis Code    Hypertension I10    Hyperlipidemia E78.5    Chronic back pain M54.9, G89.29    PCOS (polycystic ovarian syndrome) E28.2    Schizophrenia ([facility]) F20.9    Depression F32.9    Genital warts A63.0    Dysmenorrhea N94.6    Menorrhagia N92.0    Abnormal uterine bleeding (AUB) N93.9    Endometrium, polyp N84.0    Arthritis M19.90    Bilateral chronic knee pain M25.561, M25.562, G89.29    Astigmatism, regular H52.229    Diabetes mellitus type 2 in obese ([facility]) E11.69, E66.9    Increased frequency of urination R35.0    Myopia H52.10    OAG (open angle glaucoma) suspect, low risk H40.019    Urge incontinence of urine N39.41    Primary osteoarthritis of both knees M17.0    Morbid obesity with BMI of 60.0-69.9, adult ([facility]) E66.01, Z68.44         DISPOSITION/PLAN   DISPOSITION Decision To Discharge 09/17/2021 09:29:34 PM      PATIENT REFERRED TO:   [address]   [place] [id number]  [phone]    Schedule an appointment as soon as possible for a visit       [facility] ED  [address] [facility]  [phone]    If symptoms worsen, As needed      DISCHARGE MEDICATIONS:     New Prescriptions    POTASSIUM CHLORIDE (KLOR-CON M) 20 MEQ EXTENDED RELEASE TABLET    Take 1 tablet by mouth daily for 5 days           (Please note that portions of this note were completed with a voice recognition program.  Efforts were made to edit the dictations but occasionally words are mis-transcribed.)    Margaux Gomez, APRN - CNP     Margaux Gomez, SINTIA - [address] [facility]  09/17/21 [id number]

## 2021-09-18 NOTE — ED NOTES
Pt presents to the er after being told by her doctor to go to the er because her blood sugar was to high and her potassium level was to low pt blood sugar on arrival to the er is 119.       Suresh Hernandez RN  09/17/21 2037

## 2021-11-23 ENCOUNTER — HOSPITAL ENCOUNTER (OUTPATIENT)
Dept: MAMMOGRAPHY | Age: 51
Discharge: HOME | End: 2021-11-25
Payer: COMMERCIAL

## 2021-11-23 DIAGNOSIS — Z12.31 ENCOUNTER FOR SCREENING MAMMOGRAM FOR MALIGNANT NEOPLASM OF BREAST: ICD-10-CM

## 2021-11-23 PROCEDURE — 77063 BREAST TOMOSYNTHESIS BI: CPT

## 2022-01-19 ENCOUNTER — VIRTUAL VISIT (OUTPATIENT)
Dept: PULMONOLOGY | Age: 52
End: 2022-01-19
Payer: COMMERCIAL

## 2022-01-19 DIAGNOSIS — G47.33 OSA (OBSTRUCTIVE SLEEP APNEA): ICD-10-CM

## 2022-01-19 DIAGNOSIS — J45.30 MILD PERSISTENT ASTHMA WITHOUT COMPLICATION: Primary | ICD-10-CM

## 2022-01-19 PROCEDURE — 1036F TOBACCO NON-USER: CPT | Performed by: INTERNAL MEDICINE

## 2022-01-19 PROCEDURE — 99213 OFFICE O/P EST LOW 20 MIN: CPT | Performed by: INTERNAL MEDICINE

## 2022-01-19 PROCEDURE — G8427 DOCREV CUR MEDS BY ELIG CLIN: HCPCS | Performed by: INTERNAL MEDICINE

## 2022-01-19 PROCEDURE — 3017F COLORECTAL CA SCREEN DOC REV: CPT | Performed by: INTERNAL MEDICINE

## 2022-01-19 PROCEDURE — G8484 FLU IMMUNIZE NO ADMIN: HCPCS | Performed by: INTERNAL MEDICINE

## 2022-01-19 PROCEDURE — G8417 CALC BMI ABV UP PARAM F/U: HCPCS | Performed by: INTERNAL MEDICINE

## 2022-01-19 NOTE — PROGRESS NOTES
OUTPATIENT PULMONARY PROGRESS NOTE    Telehealth visit  Telephone visit    Patient:  Wm Zamora  MRN: [id number]    Consulting Physician: Dr Chichi Lombardo  Reason for Consult: Obstructive Sleep Apnea  Primacy Care Physician: Erin Oates    HISTORY OF PRESENT ILLNESS:   The patient is a 45 y.o. female   Follow-up of mild asthma/allergic rhinitis/severe obstructive sleep apnea. She was seen last time in the office in October 2020 and since then had not been to the office according to patient she cannot come and go around outside the house because of her legs issue. According to patient she was in [facility] recently with swelling of her legs and knees and she was told that she has water around the lung. She had been on Lasix she does not know if she was diagnosed with congestive heart failure but patient was referred to rehabilitation to [facility] where she is getting rehab for her knees and legs. She is not on oxygen. She has history of asthma and her asthma has been stable in the past and evaluate she was seen last time she was supposed to be on Qvar but she had stopped using Qvar for some time and her symptoms were stable. According to patient currently she is taking Breo and also Flovent she take albuterol as needed she does not know who started her back on the medications since she was not seen here since October 2020. She does not complain of daily cough and she denies any sputum production denies wheezing. According to patient she get shortness of breath on exertion activity but she is not very active because of her legs issue and knee issues. She denies PND orthopnea. According to patient she does not have shortness of breath but because of her knee issues she is not active and has limited ability to ambulate. She denies postnasal drip currently nasal congestion and epistaxis.      He has history of intolerance/noncompliance with CPAP stop using CPAP for long time she was supposed to be on CPAP at 9 cm and according to patient she is not using CPAP. She does get tiredness and fatigue during the daytime. Different masks and pillows were tried in the past for CPAP but she was not able to tolerate it    Last pulmonary function test last year showed normal spirometry and no obstruction and unable to do DLCO and lung volumes. She had a chest x-ray done on 12/23/2021 the report is in [facility] which did not show pleural effusion showed mild pulmonary vascular congestion and no consolidation. Previous history and clinical course  She was seen initially in 2017 and she was supposed to be on auto CPAP 10-20. She was started on auto CPAP but she was not compliant with CPAP. She had a re-titration study done and her CPAP was changed to 9 cm, she remained noncompliant with CPAP. She has a history of asthma which was mild persistent/intermittent asthma she claimed that her asthma was under control on medication when she was taking inhaled corticosteroids.   Her allergic rhinitis symptoms are also under control and she use Flonase as needed      Sleep Medicine 10/30/2020 10/30/2019 3/13/2019 8/28/2018 5/11/2018 5/11/2018 3/28/2018   Sitting and reading 1 2 3 1 - - 0   Watching TV 1 3 3 1 - - 0   Sitting, inactive in a public place (e.g. a theatre or a meeting) 0 1 1 0 - - 0   As a passenger in a car for an hour without a break 0 0 0 0 - - 0   Lying down to rest in the afternoon when circumstances permit 0 3 2 1 - - 2   Sitting and talking to someone 0 3 0 0 - - 0   Sitting quietly after a lunch without alcohol 0 2 1 0 - - 0   In a car, while stopped for a few minutes in traffic 0 0 0 0 - - 0   Total score 2 14 10 3 - - 2   Neck circumference - - - - 47 46 0           Past Medical History:        Diagnosis Date    Asthma     Mild intermittent asthma without complication    Astigmatism, regular 4/12/2017    Bronchitis 2009    Chronic back pain     on 2/18/20 pt is presently on pain mgmnt    Depression 5/1/2014    Diabetes mellitus ([facility] [address]     Type II    Frequency of urination     Hyperlipidemia     Hypertension     Increased frequency of urination 8/27/2018    Morbid obesity with body mass index (BMI) of 50.0 to 59.9 in adult ([facility] [address]     Myopia 4/12/2017    OAG (open angle glaucoma) suspect, low risk 5/4/2017    Palpitations     occasional    Schizophrenia ([facility] [address]     Sleep apnea since Oct. 2017    CPAP nightly    Urge incontinence of urine 8/27/2018    Wears glasses        Past Surgical History:        Procedure Laterality Date    DILATION AND CURETTAGE OF UTERUS N/A 11/14/2017    OPERATIVE HYSTEROSCOPY WITH MYOSURE, EXCISION ENDOMETRIAL POLYPS performed by Ian Saleh MD at [address] NERV Bilateral 3/25/2019    BILATERAL KNEE FLORO GUIDED STEROID INJECTION performed by Em Card MD at [address] [place] [address] NERV Bilateral 5/13/2019    SYNVISC  MEDICATION INJECTION   FLUORO GUIDED  FABIOLA KNEES performed by Em Card MD at [address] [place] [address] NERV Bilateral 5/20/2019    INJECTION SYNVISC  FLUORO GUIDED FABIOLA KNEES performed by Em Card MD at [address] [place] [address] NERV Bilateral 6/10/2019    INJECTION SYNVISC FLUORO GUIDED FABIOLA KNEES performed by Em Card MD at [address] [place] [address] NERV Bilateral 11/18/2019    INJECTION SYNVISC BILATERAL KNEE performed by Em Card MD at [address] [place] [address]  11/14/2017    exc endometrial polyps    MEDICATION INJECTION N/A 8/17/2020    1ST -U/S GUIDED KNEE SYNVISC INJECTION performed by Em Card MD at [address] [place] [address] 8/24/2020    2ND -U/S GUIDED KNEE SYNVISC INJECTION performed by Em Card MD at [address] [place] [address] 8/31/2020    3RD U/S GUIDED KNEE SYNVISC INJECTION performed by Em Card MD at [facility] Right 10/17/14    excision dorsal exostosis rt foot    OTHER SURGICAL HISTORY Bilateral 06/10/2019    INJECTION SYNVISC FLUORO GUIDED FABIOLA KNEES (Bilateral )       Allergies: Allergies   Allergen Reactions    Lisinopril      Makes her cough         Home Meds:   Outpatient Encounter Medications as of 1/19/2022   Medication Sig Dispense Refill    Cholecalciferol (VITAMIN D3) 50 MCG (2000 UT) CAPS Take by mouth daily      furosemide (LASIX) 20 MG tablet Take 20 mg by mouth daily as needed 1/2 tab      hydrOXYzine (VISTARIL) 25 MG capsule Take 25 mg by mouth daily      latanoprost (XALATAN) 0.005 % ophthalmic solution 1 drop nightly      metOLazone (ZAROXOLYN) 2.5 MG tablet Take 2.5 mg by mouth daily      omeprazole (PRILOSEC) 20 MG delayed release capsule Take 20 mg by mouth daily      meloxicam (MOBIC) 7.5 MG tablet Take 7.5 mg by mouth daily      lidocaine (LMX) 4 % cream Apply topically as needed      fluticasone-vilanterol (BREO ELLIPTA) 100-25 MCG/INH AEPB inhaler Inhale 1 puff into the lungs daily      Tens Unit MISC by Does not apply route 1 each 0    TRAVATAN Z 0.004 % SOLN ophthalmic solution PLACE 1 DROP IN BOTH EYES EACH NIGHT  1    tretinoin (RETIN-A) 0.025 % cream Apply pea sized amount to face, chest and back nightly 45 g 3    bisacodyl (DULCOLAX) 5 MG EC tablet TAKE 4 TABS AT 10 AM THE DAY PRIOR TO COLONOSCOPY 4 tablet 0    Elastic Bandages & Supports (KNEE BRACE ADJUSTABLE HINGED) MISC Use as directed 1 each 0    gabapentin (NEURONTIN) 300 MG capsule Take 1 capsule by mouth. .      fluticasone (FLOVENT HFA) 110 MCG/ACT inhaler Inhale 1 puff into the lungs 2 times daily 1 Inhaler 11    albuterol sulfate  (90 Base) MCG/ACT inhaler Inhale 2 puffs into the lungs every 6 hours as needed for Wheezing 1 Inhaler 3    ibuprofen (ADVIL;MOTRIN) 800 MG tablet Take 1 tablet by mouth every 6 hours as needed for Pain 30 tablet 0    DICLOFENAC POTASSIUM PO Take 50 mg by mouth 3 times daily as needed      ARIPiprazole (ABILIFY) 15 MG tablet Take 15 mg by mouth nightly       traZODone (DESYREL) 50 MG tablet Take 50 mg by mouth nightly      pravastatin (PRAVACHOL) 80 MG tablet Take 80 mg by mouth nightly       oxybutynin (DITROPAN-XL) 10 MG extended release tablet Take 10 mg by mouth daily      potassium chloride (MICRO-K) 10 MEQ CR capsule Take 10 mEq by mouth daily.  valsartan (DIOVAN) 40 MG tablet Take 40 mg by mouth nightly Patient takes at night      metFORMIN (GLUCOPHAGE) 500 MG tablet Take 500 mg by mouth daily (with breakfast).  hydrochlorothiazide (MICROZIDE) 12.5 MG capsule Take 12.5 mg by mouth nightly       amLODIPine (NORVASC) 10 MG tablet Take 10 mg by mouth nightly Patient takes at night      potassium chloride (KLOR-CON M) 20 MEQ extended release tablet Take 1 tablet by mouth daily for 5 days 5 tablet 0    diclofenac sodium (VOLTAREN) 1 % GEL Apply 4 g topically 4 times daily 4 Tube 3    triamcinolone (KENALOG) 0.025 % cream Apply to rash twice on back of neck twice daily (Patient not taking: Reported on 1/19/2022) 80 g 2    Elastic Bandages & Supports (KNEE BRACE) MISC 2 Units by Does not apply route daily 2 each 0     No facility-administered encounter medications on file as of 1/19/2022. Social History:   TOBACCO:   reports that she has never smoked. She has never used smokeless tobacco.  ETOH:   reports no history of alcohol use.   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    Family History:       Problem Relation Age of Onset    Diabetes Mother     Heart Failure Mother     Diabetes Maternal Grandmother     Heart Failure Maternal Grandmother     No Known Problems Maternal Grandfather     No Known Problems Paternal Grandmother     No Known Problems Paternal Grandfather     Cancer Paternal Aunt        Immunizations:    Immunization History   Administered Date(s) Administered    COVID-19, J&J, PF, 0.5 mL 03/31/2021    Influenza Vaccine, unspecified formulation 10/26/2017, 09/27/2018 REVIEW OF SYSTEMS:    General ROS: positive for  - fatigue and negative for weight gain, fever chills  ENT ROS: negative for - nasal congestion and nasal discharge  negative for - sneezing, sore throat, tinnitus, vertigo, visual changes or vocal changes  Allergy and Immunology ROS: negative for - nasal congestion, postnasal drip and seasonal allergies  Respiratory ROS: Positive for dyspnea on activities and exertion, negative for - cough, wheezing hemoptysis chest pain, negative for sputum changes. Cardiovascular ROS: positive for - dyspnea on exertion and chronic edema, negative for - chest pain, irregular heartbeat, loss of consciousness, murmur, orthopnea, palpitations, paroxysmal nocturnal dyspnea  Gastrointestinal ROS: no abdominal pain, change in bowel habits, or black or bloody stools  Musculoskeletal ROS: positive for - joint pain and joint stiffness  Positive snoring, choking, excessive daytime sleepiness, falling asleep while watching television, disrupted sleep, naps      Physical Exam:    Vitals: LMP  (LMP Unknown) Comment: last period was before March 2018  Last 3 weights: Wt Readings from Last 3 Encounters:   09/17/21 (!) 365 lb (165.6 kg)   11/16/20 (!) 370 lb (167.8 kg)   08/31/20 (!) 365 lb (165.6 kg)     There is no height or weight on file to calculate BMI. Physical Examination:   Physical examination not done as this is a telephone visit. No conversational dyspnea noted on the phone.         LABS:    CBC:   WBC   Date Value Ref Range Status   09/17/2021 7.7 3.5 - 11.3 k/uL Final   12/10/2020 6.5 3.5 - 11.3 k/uL Final   12/04/2019 7.9 3.5 - 11.3 k/uL Final     Hemoglobin   Date Value Ref Range Status   09/17/2021 12.5 11.9 - 15.1 g/dL Final   12/10/2020 12.3 11.9 - 15.1 g/dL Final   12/04/2019 12.2 11.9 - 15.1 g/dL Final     Platelets   Date Value Ref Range Status   09/17/2021 282 138 - 453 k/uL Final   12/10/2020 260 138 - 453 k/uL Final   12/04/2019 248 138 - 453 k/uL Final BMP:   Sodium   Date Value Ref Range Status   09/17/2021 137 135 - 144 mmol/L Final   12/10/2020 141 135 - 144 mmol/L Final   07/09/2020 140 135 - 144 mmol/L Final     Potassium   Date Value Ref Range Status   09/17/2021 3.0 (L) 3.7 - 5.3 mmol/L Final   12/10/2020 3.8 3.7 - 5.3 mmol/L Final   07/09/2020 3.6 (L) 3.7 - 5.3 mmol/L Final     Chloride   Date Value Ref Range Status   09/17/2021 97 (L) 98 - 107 mmol/L Final   12/10/2020 103 98 - 107 mmol/L Final   07/09/2020 102 98 - 107 mmol/L Final     CO2   Date Value Ref Range Status   09/17/2021 30 20 - 31 mmol/L Final   12/10/2020 26 20 - 31 mmol/L Final   07/09/2020 26 20 - 31 mmol/L Final     BUN   Date Value Ref Range Status   09/17/2021 11 6 - 20 mg/dL Final   09/17/2021 12 6 - 20 mg/dL Final   12/10/2020 13 6 - 20 mg/dL Final     CREATININE   Date Value Ref Range Status   09/17/2021 0.87 0.50 - 0.90 mg/dL Final   09/17/2021 0.80 0.50 - 0.90 mg/dL Final   12/10/2020 0.81 0.50 - 0.90 mg/dL Final     Glucose   Date Value Ref Range Status   09/17/2021 82 70 - 99 mg/dL Final   12/10/2020 96 70 - 99 mg/dL Final   07/09/2020 94 70 - 99 mg/dL Final     Hepatic:   AST   Date Value Ref Range Status   12/10/2020 18 <32 U/L Final   12/04/2019 16 <32 U/L Final   07/01/2019 13 <32 U/L Final     ALT   Date Value Ref Range Status   12/10/2020 13 5 - 33 U/L Final   12/04/2019 18 5 - 33 U/L Final   07/01/2019 16 5 - 33 U/L Final     Total Bilirubin   Date Value Ref Range Status   12/10/2020 0.16 (L) 0.3 - 1.2 mg/dL Final   12/04/2019 0.19 (L) 0.3 - 1.2 mg/dL Final   07/01/2019 0.20 (L) 0.3 - 1.2 mg/dL Final     Alkaline Phosphatase   Date Value Ref Range Status   12/10/2020 75 35 - 104 U/L Final   12/04/2019 90 35 - 104 U/L Final   07/01/2019 84 35 - 104 U/L Final     Amylase: No results found for: AMYLASE  Lipase: No results found for: LIPASE  CARDIAC ENZYMES: No results found for: CKTOTAL, CKMB, CKMBINDEX, TROPONINI  BNP: No results found for: BNP  Lipids:   Cholesterol, Total   Date Value Ref Range Status   01/15/2018 174 mg/dL Final     Cholesterol   Date Value Ref Range Status   12/10/2020 176 <200 mg/dL Final     Comment:        Cholesterol Guidelines:      <200  Desirable   200-240  Borderline      >240  Undesirable          HDL   Date Value Ref Range Status   12/10/2020 53 >40 mg/dL Final     Comment:        HDL Guidelines:    <40     Undesirable   40-59    Borderline    >59     Desirable          ABGs: No results found for: PHART, PO2ART, WKE4CQE  INR: No results found for: INR  Thyroid:   Lab Results   Component Value Date    TSH 0.94 12/10/2020     Urinalysis:     Cultures:-  -----------------------------------------------------------------  CXR    Chest x-ray 12/23/2021 report from [facility] no images available  *  Lungs are clear   *  No pleural effusion, pneumothorax, nor volume loss   *  Heart and mediastinal structures are unremarkable   *  Pulmonary vasculature stable   *  In summary, stable single view chest.     Immunization History   Administered Date(s) Administered    COVID-19, J&J, PF, 0.5 mL 03/31/2021    Influenza Vaccine, unspecified formulation 10/26/2017, 09/27/2018          Pulmonary Function Test:    10/30/2019: FEV1 2.00 83%, FVC 2.64 88%, FEV1 FVC 93%    09/13/2017:  FEV1 2.03 82%, FVC 2.48 81%, FEV1/%, RV 1.48 90%, TLC 3.79 80.8 %, DLCO 16.70 76%    Compliance data  No data as she is not using it    Last compliance 05/07/18 to 06/05/18  Usage 3 out of 30 days compliance 10%. Average usage 22 minutes for total days used. AHI 1.6 on CPAP of 9 cm    Assessment and Plan       ICD-10-CM    1. Mild persistent asthma without complication  B29.19    2. AISHWARYA (obstructive sleep apnea)  G47.33       Hypertension    Hyperlipidemia    Schizophrenia ([facility] [address]     Plan and recommendations:    I have advised her to follow-up with her PCP for knee stockings and to continue rehabilitation/physical therapy.   Advised to follow-up with primary care physician for hypertension and diuretics and for edema    I have discussed with patient that she should not be taking both Breo and Flovent together she wanted to continue with Breo currently she is 100/25 advised to continue 1 puff once daily. Albuterol to be used as needed. While she is taking Breo advised patient to DC Flovent. Avoid allergens and triggers  Flonase nasal spray as needed    I have again discussed with her today about CPAP encouraged to use CPAP patient does not want to use CPAP again as unfortunately she was not able to tolerate CPAP despite I had multiple discussion in the past and different mask pillows and headgear was used. Maintain an active lifestyle   Wt loss is recommended and discussed  Follow good sleep hygeine instructions  Given sleep hygeine instructions    Vaccinations recommended and she she get her vaccination from PCP/pharmacy. Had Pneumonia vaccine 2 years ago  Annual flu vaccine in fall   Questions answered to pt's satisfaction     RTC 3 to 4 months. Advised patient to follow-up in the office face-to-face visit according to patient she is not able to ambulate out of the house and if she is not able to come to office she will schedule telephone visit    It was my pleasure to evaluate Josi Lomeli today. Please call with questions. Mayur Hernandez MD, MD             1/19/2022, 1:19 PM    Please note that this chart was generated using voice recognition Dragon dictation software. Although every effort was made to ensure the accuracy of this automated transcription, some errors in transcription may have occurred. Josi Lomeli is a 45 y.o. female being evaluated by a Virtual Visit (video/telephone visit) encounter to address concerns as mentioned above. A caregiver was present when appropriate.  Due to this being a TeleHealth encounter (During DNEWY-82 public health emergency), evaluation of the following organ systems was limited: Vitals/Constitutional/EENT/Resp/CV/GI//MS/Neuro/Skin/Heme-Lymph-Imm. Pursuant to the emergency declaration under the [address] [place] [address], [address] [place] [address] and the [employer] Act, this Virtual Visit was conducted with patient's (and/or legal guardian's) consent, to reduce the patient's risk of exposure to COVID-19 and provide necessary medical care. The patient (and/or legal guardian) has also been advised to contact this office for worsening conditions or problems, and seek emergency medical treatment and/or call 911 if deemed necessary. Patient identification was verified at the start of the visit: Yes  Total time spent for this encounter: 24 minutes  Services were provided through a video/telephone synchronous discussion virtually to substitute for in-person clinic visit. Patient and provider were located at their individual locations at home and office respectively. --Esme Ross MD on 1/19/2022 at 1:19 PM    An electronic signature was used to authenticate this note.

## 2022-01-20 ENCOUNTER — TELEPHONE (OUTPATIENT)
Dept: PULMONOLOGY | Age: 52
End: 2022-01-20

## 2022-01-20 NOTE — TELEPHONE ENCOUNTER
PATIENT HOME CARE NURSE ALVARO GUTIERREZ CALLED TO CONFIRM PT IS TO STOP FLOVENT AND STAY ON BREO   I READ OVER DR. Greg Van PHONE NOTE FROM YESTERDAY AND INFORMED PT AND ALVARO THAT THIS IS CORRECT. THEY VERBALIZED UNDERSTANDING.

## 2022-04-29 ENCOUNTER — SCHEDULED TELEPHONE ENCOUNTER (OUTPATIENT)
Dept: PULMONOLOGY | Age: 52
End: 2022-04-29
Payer: COMMERCIAL

## 2022-04-29 DIAGNOSIS — G47.33 OSA (OBSTRUCTIVE SLEEP APNEA): ICD-10-CM

## 2022-04-29 DIAGNOSIS — J30.9 ALLERGIC RHINITIS, UNSPECIFIED SEASONALITY, UNSPECIFIED TRIGGER: ICD-10-CM

## 2022-04-29 DIAGNOSIS — J45.30 MILD PERSISTENT ASTHMA WITHOUT COMPLICATION: Primary | ICD-10-CM

## 2022-04-29 PROCEDURE — 99443 PR PHYS/QHP TELEPHONE EVALUATION 21-30 MIN: CPT | Performed by: INTERNAL MEDICINE

## 2022-04-29 RX ORDER — FLUTICASONE FUROATE AND VILANTEROL TRIFENATATE 100; 25 UG/1; UG/1
1 POWDER RESPIRATORY (INHALATION) DAILY
Qty: 30 EACH | Refills: 11 | Status: SHIPPED | OUTPATIENT
Start: 2022-04-29

## 2022-04-29 NOTE — PROGRESS NOTES
OUTPATIENT PULMONARY PROGRESS NOTE    Telehealth visit  Telephone visit    Patient:  Alicia Cervantes  MRN: [id number]    Consulting Physician: Dr Brent Brady  Reason for Consult: Obstructive Sleep Apnea  Primacy Care Physician: Conchita Tyler    HISTORY OF PRESENT ILLNESS:   The patient is a 45 y.o. female   Follow-up of mild asthma/allergic rhinitis/obstructive sleep apnea. This is a virtual telephone visit for follow-up. According to patient she is in a facility she is getting treatment and rehab for chronic lymphedema she has difficulty standing up she is on wheelchair and sometime she walk on the walker but she is not able to walk independently. She is taking Breo once daily. She does not complain of cough. Her cough is under control and very occasionally she has cough. She denies any sputum production denies any change in the color of the sputum volume the sputum if she has any sputum. She does not complain of wheezing except when she has to walk a little longer distance. She denies nocturnal awakening with cough wheezing chest tightness or shortness of breath. She has mild chronic orthopnea denies any change denies PND. She has chronic edema and she take Lasix once daily. She does not complain of postnasal drip nasal congestion and epistaxis. She take albuterol as needed and GERD. Patient states she is on Breo once daily she has not taken albuterol for a while. She has history of sleep apnea she was supposed to be on CPAP 9 cm she had not used CPAP for a long time she was tried on different mask and pillows but she was intolerant/noncompliant with CPAP and did not want the CPAP anymore. According to patient she occasionally doze off during the daytime she take nap on Saturday only and when she wake up in the morning she does feel fresh. Her last pulmonary function test was 10/30/2019 and had normal spirometry without obstruction she was unable to do DLCO and lung volumes.   She had a chest x-ray done on 12/23/2021 the report is in [address] [place] [address] which did not show pleural effusion showed mild pulmonary vascular congestion and no consolidation. Previous history and clinical course  She was seen initially in 2017 and she was supposed to be on auto CPAP 10-20. She was started on auto CPAP but she was not compliant with CPAP. She had a re-titration study done and her CPAP was changed to 9 cm, she remained noncompliant with CPAP. She has a history of asthma which was mild persistent/intermittent asthma she claimed that her asthma was under control on medication when she was taking inhaled corticosteroids.   Her allergic rhinitis symptoms are also under control and she use Flonase as needed      Sleep Medicine 10/30/2020 10/30/2019 3/13/2019 8/28/2018 5/11/2018 5/11/2018 3/28/2018   Sitting and reading 1 2 3 1 - - 0   Watching TV 1 3 3 1 - - 0   Sitting, inactive in a public place (e.g. a theatre or a meeting) 0 1 1 0 - - 0   As a passenger in a car for an hour without a break 0 0 0 0 - - 0   Lying down to rest in the afternoon when circumstances permit 0 3 2 1 - - 2   Sitting and talking to someone 0 3 0 0 - - 0   Sitting quietly after a lunch without alcohol 0 2 1 0 - - 0   In a car, while stopped for a few minutes in traffic 0 0 0 0 - - 0   Total score 2 14 10 3 - - 2   Neck circumference (Inches) - - - - 47 46 0           Past Medical History:        Diagnosis Date    Asthma     Mild intermittent asthma without complication    Astigmatism, regular 4/12/2017    Bronchitis 2009    Chronic back pain     on 2/18/20 pt is presently on pain mgmnt    Depression 5/1/2014    Diabetes mellitus ([facility] [address]     Type II    Frequency of urination     Hyperlipidemia     Hypertension     Increased frequency of urination 8/27/2018    Morbid obesity with body mass index (BMI) of 50.0 to 59.9 in adult ([address]     Myopia 4/12/2017    OAG (open angle glaucoma) suspect, low risk 5/4/2017    Palpitations     occasional    Schizophrenia ([facility] [address]     Sleep apnea since Oct. 2017    CPAP nightly    Urge incontinence of urine 8/27/2018    Wears glasses        Past Surgical History:        Procedure Laterality Date    DILATION AND CURETTAGE OF UTERUS N/A 11/14/2017    OPERATIVE HYSTEROSCOPY WITH MYOSURE, EXCISION ENDOMETRIAL POLYPS performed by Bethanie Hernandez MD at [address] [place] [address] [address] NERV Bilateral 3/25/2019    BILATERAL KNEE FLORO GUIDED STEROID INJECTION performed by Paty Wilkinson MD at [address] [place] [address] NERV Bilateral 5/13/2019    SYNVISC  MEDICATION INJECTION   FLUORO GUIDED  FABIOLA KNEES performed by Paty Wilkinson MD at [address] [place] [address] NERV Bilateral 5/20/2019    INJECTION SYNVISC  FLUORO GUIDED FABIOLA KNEES performed by Paty Wilkinson MD at [address] [place] [address] NERV Bilateral 6/10/2019    INJECTION SYNVISC FLUORO GUIDED FABIOLA KNEES performed by Paty Wilkinson MD at [address] [place] [address] NERV Bilateral 11/18/2019    INJECTION SYNVISC BILATERAL KNEE performed by Paty Wilkinson MD at [address] [place] [address]  11/14/2017    exc endometrial polyps    MEDICATION INJECTION N/A 8/17/2020    1ST -U/S GUIDED KNEE SYNVISC INJECTION performed by Paty Wilkinson MD at [address] [place] [address] 8/24/2020    2ND -U/S GUIDED KNEE SYNVISC INJECTION performed by Paty Wilkinson MD at [address] [place] [address] 8/31/2020    3RD U/S GUIDED KNEE SYNVISC INJECTION performed by Paty Wilkinson MD at [place] [address] Right 10/17/14    excision dorsal exostosis rt foot    OTHER SURGICAL HISTORY Bilateral 06/10/2019    INJECTION SYNVISC FLUORO GUIDED FABIOLA KNEES (Bilateral )       Allergies:     Allergies   Allergen Reactions    Lisinopril      Makes her cough         Home Meds:   Outpatient Encounter Medications as of 4/29/2022   Medication Sig Dispense Refill    Cholecalciferol (VITAMIN D3) 50 MCG (2000 UT) CAPS Take by mouth daily      furosemide (LASIX) 20 MG tablet Take 20 mg by mouth daily as needed 1/2 tab      hydrOXYzine (VISTARIL) 25 MG capsule Take 25 mg by mouth daily      latanoprost (XALATAN) 0.005 % ophthalmic solution 1 drop nightly      metOLazone (ZAROXOLYN) 2.5 MG tablet Take 2.5 mg by mouth daily      omeprazole (PRILOSEC) 20 MG delayed release capsule Take 20 mg by mouth daily      meloxicam (MOBIC) 7.5 MG tablet Take 7.5 mg by mouth daily      lidocaine (LMX) 4 % cream Apply topically as needed      fluticasone-vilanterol (BREO ELLIPTA) 100-25 MCG/INH AEPB inhaler Inhale 1 puff into the lungs daily      Tens Unit MISC by Does not apply route 1 each 0    TRAVATAN Z 0.004 % SOLN ophthalmic solution PLACE 1 DROP IN BOTH EYES EACH NIGHT  1    triamcinolone (KENALOG) 0.025 % cream Apply to rash twice on back of neck twice daily 80 g 2    tretinoin (RETIN-A) 0.025 % cream Apply pea sized amount to face, chest and back nightly 45 g 3    bisacodyl (DULCOLAX) 5 MG EC tablet TAKE 4 TABS AT 10 AM THE DAY PRIOR TO COLONOSCOPY 4 tablet 0    Elastic Bandages & Supports (KNEE BRACE ADJUSTABLE HINGED) MISC Use as directed 1 each 0    gabapentin (NEURONTIN) 300 MG capsule Take 1 capsule by mouth. .      albuterol sulfate  (90 Base) MCG/ACT inhaler Inhale 2 puffs into the lungs every 6 hours as needed for Wheezing 1 Inhaler 3    ibuprofen (ADVIL;MOTRIN) 800 MG tablet Take 1 tablet by mouth every 6 hours as needed for Pain 30 tablet 0    DICLOFENAC POTASSIUM PO Take 50 mg by mouth 3 times daily as needed      ARIPiprazole (ABILIFY) 15 MG tablet Take 15 mg by mouth nightly       traZODone (DESYREL) 50 MG tablet Take 50 mg by mouth nightly      pravastatin (PRAVACHOL) 80 MG tablet Take 80 mg by mouth nightly       oxybutynin (DITROPAN-XL) 10 MG extended release tablet Take 10 mg by mouth daily      potassium chloride (MICRO-K) 10 MEQ CR capsule Take 10 mEq by mouth daily.       valsartan (DIOVAN) 40 MG tablet Take 40 mg by mouth nightly Patient takes at night      metFORMIN (GLUCOPHAGE) 500 MG tablet Take 500 mg by mouth daily (with breakfast).  hydrochlorothiazide (MICROZIDE) 12.5 MG capsule Take 12.5 mg by mouth nightly       amLODIPine (NORVASC) 10 MG tablet Take 10 mg by mouth nightly Patient takes at night      potassium chloride (KLOR-CON M) 20 MEQ extended release tablet Take 1 tablet by mouth daily for 5 days 5 tablet 0    diclofenac sodium (VOLTAREN) 1 % GEL Apply 4 g topically 4 times daily 4 Tube 3    Elastic Bandages & Supports (KNEE BRACE) MISC 2 Units by Does not apply route daily 2 each 0    fluticasone (FLOVENT HFA) 110 MCG/ACT inhaler Inhale 1 puff into the lungs 2 times daily 1 Inhaler 11     No facility-administered encounter medications on file as of 4/29/2022. Social History:   TOBACCO:   reports that she has never smoked. She has never used smokeless tobacco.  ETOH:   reports no history of alcohol use.   OCCUPATION:  Use to work in [employer]    Family History:       Problem Relation Age of Onset    Diabetes Mother     Heart Failure Mother     Diabetes Maternal Grandmother     Heart Failure Maternal Grandmother     No Known Problems Maternal Grandfather     No Known Problems Paternal Grandmother     No Known Problems Paternal Grandfather     Cancer Paternal Aunt        Immunizations:    Immunization History   Administered Date(s) Administered    COVID-19, J&J, PF, 0.5 mL 03/31/2021    Influenza Vaccine, unspecified formulation 10/26/2017, 09/27/2018         REVIEW OF SYSTEMS:    General ROS: Negative for  - fatigue and negative for weight gain, fever chills  ENT ROS: negative for - nasal congestion and nasal discharge, negative for - sneezing, sore throat, tinnitus, vertigo, visual changes or vocal changes  Allergy and Immunology ROS: negative for - nasal congestion, postnasal drip and seasonal allergies  Respiratory ROS: Positive for dyspnea on activities and exertion, negative for - cough, wheezing hemoptysis chest pain, negative for sputum changes. Cardiovascular ROS: positive for - dyspnea on exertion and chronic edema, negative for - chest pain, irregular heartbeat, loss of consciousness, murmur, orthopnea, palpitations, paroxysmal nocturnal dyspnea  Gastrointestinal ROS: no abdominal pain, change in bowel habits, or black or bloody stools  Musculoskeletal ROS: positive for - joint pain and joint stiffness      Physical Exam:    Vitals: LMP  (LMP Unknown) Comment: last period was before March 2018  Last 3 weights: Wt Readings from Last 3 Encounters:   09/17/21 (!) 365 lb (165.6 kg)   11/16/20 (!) 370 lb (167.8 kg)   08/31/20 (!) 365 lb (165.6 kg)     There is no height or weight on file to calculate BMI. Physical Examination:   Physical examination not done as this is a telephone visit. No conversational dyspnea noted on the phone.         LABS:    CBC:   WBC   Date Value Ref Range Status   09/17/2021 7.7 3.5 - 11.3 k/uL Final   12/10/2020 6.5 3.5 - 11.3 k/uL Final   12/04/2019 7.9 3.5 - 11.3 k/uL Final     Hemoglobin   Date Value Ref Range Status   09/17/2021 12.5 11.9 - 15.1 g/dL Final   12/10/2020 12.3 11.9 - 15.1 g/dL Final   12/04/2019 12.2 11.9 - 15.1 g/dL Final     Platelets   Date Value Ref Range Status   09/17/2021 282 138 - 453 k/uL Final   12/10/2020 260 138 - 453 k/uL Final   12/04/2019 248 138 - 453 k/uL Final     BMP:   Sodium   Date Value Ref Range Status   09/17/2021 137 135 - 144 mmol/L Final   12/10/2020 141 135 - 144 mmol/L Final   07/09/2020 140 135 - 144 mmol/L Final     Potassium   Date Value Ref Range Status   09/17/2021 3.0 (L) 3.7 - 5.3 mmol/L Final   12/10/2020 3.8 3.7 - 5.3 mmol/L Final   07/09/2020 3.6 (L) 3.7 - 5.3 mmol/L Final     Chloride   Date Value Ref Range Status   09/17/2021 97 (L) 98 - 107 mmol/L Final   12/10/2020 103 98 - 107 mmol/L Final   07/09/2020 102 98 - 107 mmol/L Final     CO2   Date Value Ref Range Status 09/17/2021 30 20 - 31 mmol/L Final   12/10/2020 26 20 - 31 mmol/L Final   07/09/2020 26 20 - 31 mmol/L Final     BUN   Date Value Ref Range Status   09/17/2021 11 6 - 20 mg/dL Final   09/17/2021 12 6 - 20 mg/dL Final   12/10/2020 13 6 - 20 mg/dL Final     CREATININE   Date Value Ref Range Status   09/17/2021 0.87 0.50 - 0.90 mg/dL Final   09/17/2021 0.80 0.50 - 0.90 mg/dL Final   12/10/2020 0.81 0.50 - 0.90 mg/dL Final     Glucose   Date Value Ref Range Status   09/17/2021 82 70 - 99 mg/dL Final   12/10/2020 96 70 - 99 mg/dL Final   07/09/2020 94 70 - 99 mg/dL Final     Hepatic:     Amylase: No results found for: AMYLASE  Lipase: No results found for: LIPASE  CARDIAC ENZYMES: No results found for: CKTOTAL, CKMB, CKMBINDEX, TROPONINI  BNP: No results found for: BNP  Lipids:   Cholesterol, Total   Date Value Ref Range Status   01/15/2018 174 mg/dL Final     Cholesterol   Date Value Ref Range Status   12/10/2020 176 <200 mg/dL Final     Comment:        Cholesterol Guidelines:      <200  Desirable   200-240  Borderline      >240  Undesirable          HDL   Date Value Ref Range Status   12/10/2020 53 >40 mg/dL Final     Comment:        HDL Guidelines:    <40     Undesirable   40-59    Borderline    >59     Desirable          ABGs: No results found for: PHART, PO2ART, TNS5YRJ  INR: No results found for: INR  Thyroid:   Lab Results   Component Value Date    TSH 0.94 12/10/2020     Urinalysis:     Cultures:-  -----------------------------------------------------------------  CXR    Chest x-ray 12/23/2021 report from [address] [place] [address] no images available  *  Lungs are clear   *  No pleural effusion, pneumothorax, nor volume loss   *  Heart and mediastinal structures are unremarkable   *  Pulmonary vasculature stable   *  In summary, stable single view chest.     Immunization History   Administered Date(s) Administered    COVID-19, J&J, PF, 0.5 mL 03/31/2021    Influenza Vaccine, unspecified formulation 10/26/2017, 09/27/2018 Pulmonary Function Test:    10/30/2019: FEV1 2.00 83%, FVC 2.64 88%, FEV1 FVC 93%    09/13/2017:  FEV1 2.03 82%, FVC 2.48 81%, FEV1/%, RV 1.48 90%, TLC 3.79 80.8 %, DLCO 16.70 76%      Last compliance from CPAP 05/07/18 to 06/05/18  Usage 3 out of 30 days compliance 10%. Average usage 22 minutes for total days used. AHI 1.6 on CPAP of 9 cm    Assessment and Plan       ICD-10-CM    1. Mild persistent asthma without complication  R07.27    2. AISHWARYA (obstructive sleep apnea)  G47.33    3. Allergic rhinitis, unspecified seasonality, unspecified trigger  J30.9       Hypertension    Hyperlipidemia    [facility] ([facility]     Plan and recommendations:    I discussed with the patient today about the compliance with medication she is very compliant with Breo per chart she does not require albuterol and her asthma is pretty well controlled. Continue with Breo 100/25 1 puff once daily. Albuterol to be used as needed. Avoid allergens and triggers. Flonase nasal spray if needed    I have discussed with her again today about the CPAP as she has history of sleep apnea but she was not able to tolerate not interested in using CPAP different mask and pillows were tried in the past.  Maintain an active lifestyle   Wt loss is recommended and discussed  Follow good sleep hygeine instructions  Given sleep hygeine instructions    Vaccinations recommended and she she get her vaccination from PCP/pharmacy. Up-to-date on pneumonia vaccine  Annual flu vaccine in fall   According to patient she had [employer] vaccine last year. Booster recommended  Questions answered to pt's satisfaction     RTC 6 months. Advised patient to follow-up in the office per patient she is not able to ambulate out of the house and if she is not able to come to office she will schedule telephone visit    It was my pleasure to evaluate Cindy Syed today. Please call with questions.     Lieutenant Trevor MD, MD             4/29/2022, 12:35 PM    Please note that this chart was generated using voice recognition Dragon dictation software. Although every effort was made to ensure the accuracy of this automated transcription, some errors in transcription may have occurred. Sophia Tinajero is a 45 y.o. female being evaluated by a Virtual Visit (video/telephone visit) encounter to address concerns as mentioned above. A caregiver was present when appropriate. Due to this being a TeleHealth encounter (During [facility] public health emergency), evaluation of the following organ systems was limited: Vitals/Constitutional/EENT/Resp/CV/GI//MS/Neuro/Skin/Heme-Lymph-Imm. Pursuant to the emergency declaration under the [address] [place] [address] authority and the [employer] Act, this Virtual Visit was conducted with patient's (and/or legal guardian's) consent, to reduce the patient's risk of exposure to COVID-19 and provide necessary medical care. The patient (and/or legal guardian) has also been advised to contact this office for worsening conditions or problems, and seek emergency medical treatment and/or call 911 if deemed necessary. Patient identification was verified at the start of the visit: Yes  Total time spent for this encounter: 22 minutes  Services were provided through a video/telephone synchronous discussion virtually to substitute for in-person clinic visit. Patient and provider were located at their individual locations at home and office respectively. --Beverly Case MD on 4/29/2022 at 12:35 PM    An electronic signature was used to authenticate this note.

## 2022-10-07 ENCOUNTER — SCHEDULED TELEPHONE ENCOUNTER (OUTPATIENT)
Dept: PULMONOLOGY | Age: 52
End: 2022-10-07
Payer: COMMERCIAL

## 2022-10-07 DIAGNOSIS — J30.9 ALLERGIC RHINITIS, UNSPECIFIED SEASONALITY, UNSPECIFIED TRIGGER: ICD-10-CM

## 2022-10-07 DIAGNOSIS — G47.33 OSA (OBSTRUCTIVE SLEEP APNEA): ICD-10-CM

## 2022-10-07 DIAGNOSIS — J45.30 MILD PERSISTENT ASTHMA WITHOUT COMPLICATION: Primary | ICD-10-CM

## 2022-10-07 PROCEDURE — 99443 PR PHYS/QHP TELEPHONE EVALUATION 21-30 MIN: CPT | Performed by: INTERNAL MEDICINE

## 2022-10-07 ASSESSMENT — SLEEP AND FATIGUE QUESTIONNAIRES
HOW LIKELY ARE YOU TO NOD OFF OR FALL ASLEEP IN A CAR, WHILE STOPPED FOR A FEW MINUTES IN TRAFFIC: 0
HOW LIKELY ARE YOU TO NOD OFF OR FALL ASLEEP WHEN YOU ARE A PASSENGER IN A CAR FOR AN HOUR WITHOUT A BREAK: 2
ESS TOTAL SCORE: 4
HOW LIKELY ARE YOU TO NOD OFF OR FALL ASLEEP WHILE WATCHING TV: 1
HOW LIKELY ARE YOU TO NOD OFF OR FALL ASLEEP WHILE SITTING AND READING: 1
HOW LIKELY ARE YOU TO NOD OFF OR FALL ASLEEP WHILE SITTING QUIETLY AFTER LUNCH WITHOUT ALCOHOL: 0
HOW LIKELY ARE YOU TO NOD OFF OR FALL ASLEEP WHILE LYING DOWN TO REST IN THE AFTERNOON WHEN CIRCUMSTANCES PERMIT: 0
HOW LIKELY ARE YOU TO NOD OFF OR FALL ASLEEP WHILE SITTING INACTIVE IN A PUBLIC PLACE: 0
HOW LIKELY ARE YOU TO NOD OFF OR FALL ASLEEP WHILE SITTING AND TALKING TO SOMEONE: 0

## 2022-10-07 NOTE — PROGRESS NOTES
OUTPATIENT PULMONARY PROGRESS NOTE    Telehealth visit  Telephone visit    Patient:  Xiomara Zafar  MRN: [id number]    Consulting Physician: Dr Victorina Kapadia  Reason for Consult: Obstructive Sleep Apnea  Primacy Care Physician: SINTIA Quintana NP    HISTORY OF PRESENT ILLNESS:   The patient is a 47 y.o. female   Follow-up of mild asthma/allergic rhinitis/obstructive sleep apnea. This is a virtual telephone visit for follow-up. According to patient she has been having problem for some time now with chronic lymphedema she walk with a walker/cane at home and she is on treatment at home with lymphedema so she cannot come to the office and make telephone visit appointment. Since she was seen last time according to patient she has been doing fairly well. Her asthma is under control she did not have any asthma exacerbation steroid or antibiotic use. Her symptoms are stable on Breo which she take it once daily she does not complain of any thrush or dysphagia. She has intermittent cough according to patient no change in the cough most of the time cough is in the morning. She is not bringing any sputum if she has some sputum it is whitish in color denies any change in the color of the sputum within the sputum. She does not complain of wheezing except when she has to do little more exertion. She denies nocturnal awakening with cough wheezing chest tightness or shortness of breath. She has 2 pillow chronic orthopnea without much change. Denies PND. She has chronic edema and she take furosemide once daily. She denies postnasal drip nasal congestion obstruction or epistaxis. She take albuterol only as needed according to patient probably twice a month. She does have history of obstructive sleep apnea she was supposed to be on CPAP of 9 cm she had not used CPAP for long time according to patient she had problem. She was intolerant of CPAP.   According to patient she goes to sleep between 8:52 PM and wake up around 6 in the morning when she wake up she feels okay sometimes she take nap during the daytime. She occasionally doze off during the daytime. Last pulmonary function test was 10/30/2019 and had normal spirometry without obstruction she was unable to do DLCO and lung volumes. Last chest x-ray done on 12/23/2021 the report is in [address] [place] [address] which did not show pleural effusion showed mild pulmonary vascular congestion and no consolidation. Previous history and clinical course  She was seen initially in 2017 and she was supposed to be on auto CPAP 10-20. She was started on auto CPAP but she was not compliant with CPAP. She had a re-titration study done and her CPAP was changed to 9 cm, she remained noncompliant with CPAP. She has a history of asthma which was mild persistent/intermittent asthma she claimed that her asthma was under control on medication when she was taking inhaled corticosteroids.   Her allergic rhinitis symptoms are also under control and she use Flonase as needed      Sleep Medicine 10/7/2022 10/30/2020 10/30/2019 3/13/2019 8/28/2018 5/11/2018 5/11/2018   Sitting and reading 1 1 2 3 1 - -   Watching TV 1 1 3 3 1 - -   Sitting, inactive in a public place (e.g. a theatre or a meeting) 0 0 1 1 0 - -   As a passenger in a car for an hour without a break 2 0 0 0 0 - -   Lying down to rest in the afternoon when circumstances permit 0 0 3 2 1 - -   Sitting and talking to someone 0 0 3 0 0 - -   Sitting quietly after a lunch without alcohol 0 0 2 1 0 - -   In a car, while stopped for a few minutes in traffic 0 0 0 0 0 - -   [place] Sleepiness Score 4 2 14 10 3 - -   Neck circumference (Inches) - - - - - 47 46           Past Medical History:        Diagnosis Date    Asthma     Mild intermittent asthma without complication    Astigmatism, regular 4/12/2017    Bronchitis 2009    Chronic back pain     on 2/18/20 pt is presently on pain mgmnt    Depression 5/1/2014    Diabetes mellitus (HCC)     Type II    Frequency of urination     Hyperlipidemia     Hypertension     Increased frequency of urination 8/27/2018    Morbid obesity with body mass index (BMI) of 50.0 to 59.9 in adult [facility])     Myopia 4/12/2017    OAG (open angle glaucoma) suspect, low risk 5/4/2017    Palpitations     occasional    Schizophrenia ([facility] [address]     Sleep apnea since Oct. 2017    CPAP nightly    Urge incontinence of urine 8/27/2018    Wears glasses        Past Surgical History:        Procedure Laterality Date    DILATION AND CURETTAGE OF UTERUS N/A 11/14/2017    OPERATIVE HYSTEROSCOPY WITH MYOSURE, EXCISION ENDOMETRIAL POLYPS performed by El Ratliff MD at [place] [address] NERV Bilateral 3/25/2019    BILATERAL KNEE FLORO GUIDED STEROID INJECTION performed by Naun Lora MD at [address] [place] [address] NERV Bilateral 5/13/2019    SYNVISC  MEDICATION INJECTION   FLUORO GUIDED  FABIOLA KNEES performed by Naun Lora MD at [address] [place] [address] NERV Bilateral 5/20/2019    INJECTION SYNVISC  FLUORO GUIDED FABIOLA KNEES performed by Naun Lora MD at [address] [place] [address] NERV Bilateral 6/10/2019    INJECTION SYNVISC FLUORO GUIDED FABIOLA KNEES performed by Naun Lora MD at [address] [place] [address] NERV Bilateral 11/18/2019    INJECTION SYNVISC BILATERAL KNEE performed by Naun Lora MD at [facility]  11/14/2017    exc endometrial polyps    MEDICATION INJECTION N/A 8/17/2020    1ST -U/S GUIDED KNEE SYNVISC INJECTION performed by Naun Lora MD at [address] [place] [address] 8/24/2020    2ND -U/S GUIDED KNEE SYNVISC INJECTION performed by Naun Lora MD at [address] [place] [address] 8/31/2020    3RD U/S GUIDED KNEE SYNVISC INJECTION performed by Naun Lora MD at [facility] 10/17/14    excision dorsal exostosis rt foot    OTHER SURGICAL HISTORY Bilateral 06/10/2019    INJECTION SYNVISC FLUORO GUIDED FABIOLA KNEES (Bilateral )       Allergies: Allergies   Allergen Reactions    Lisinopril      Makes her cough         Home Meds:   Outpatient Encounter Medications as of 10/7/2022   Medication Sig Dispense Refill    fluticasone-vilanterol (BREO ELLIPTA) 100-25 MCG/INH AEPB inhaler Inhale 1 puff into the lungs daily 30 each 11    Cholecalciferol (VITAMIN D3) 50 MCG (2000 UT) CAPS Take by mouth daily      furosemide (LASIX) 20 MG tablet Take 20 mg by mouth daily as needed 1/2 tab      hydrOXYzine (VISTARIL) 25 MG capsule Take 25 mg by mouth daily      latanoprost (XALATAN) 0.005 % ophthalmic solution 1 drop nightly      metOLazone (ZAROXOLYN) 2.5 MG tablet Take 2.5 mg by mouth daily      omeprazole (PRILOSEC) 20 MG delayed release capsule Take 20 mg by mouth daily      meloxicam (MOBIC) 7.5 MG tablet Take 7.5 mg by mouth daily      lidocaine (LMX) 4 % cream Apply topically as needed      Tens Unit MISC by Does not apply route 1 each 0    TRAVATAN Z 0.004 % SOLN ophthalmic solution PLACE 1 DROP IN BOTH EYES EACH NIGHT  1    triamcinolone (KENALOG) 0.025 % cream Apply to rash twice on back of neck twice daily 80 g 2    tretinoin (RETIN-A) 0.025 % cream Apply pea sized amount to face, chest and back nightly 45 g 3    bisacodyl (DULCOLAX) 5 MG EC tablet TAKE 4 TABS AT 10 AM THE DAY PRIOR TO COLONOSCOPY 4 tablet 0    Elastic Bandages & Supports (KNEE BRACE ADJUSTABLE HINGED) MISC Use as directed 1 each 0    gabapentin (NEURONTIN) 300 MG capsule Take 1 capsule by mouth. Cornelio Mering       albuterol sulfate  (90 Base) MCG/ACT inhaler Inhale 2 puffs into the lungs every 6 hours as needed for Wheezing 1 Inhaler 3    ibuprofen (ADVIL;MOTRIN) 800 MG tablet Take 1 tablet by mouth every 6 hours as needed for Pain 30 tablet 0    DICLOFENAC POTASSIUM PO Take 50 mg by mouth 3 times daily as needed      ARIPiprazole (ABILIFY) 15 MG tablet Take 15 mg by mouth nightly       traZODone (DESYREL) 50 MG tablet Take 50 mg by mouth nightly pravastatin (PRAVACHOL) 80 MG tablet Take 80 mg by mouth nightly       oxybutynin (DITROPAN-XL) 10 MG extended release tablet Take 10 mg by mouth daily      potassium chloride (MICRO-K) 10 MEQ CR capsule Take 10 mEq by mouth daily. valsartan (DIOVAN) 40 MG tablet Take 40 mg by mouth nightly Patient takes at night      metFORMIN (GLUCOPHAGE) 500 MG tablet Take 500 mg by mouth daily (with breakfast). hydrochlorothiazide (MICROZIDE) 12.5 MG capsule Take 12.5 mg by mouth nightly       amLODIPine (NORVASC) 10 MG tablet Take 10 mg by mouth nightly Patient takes at night      potassium chloride (KLOR-CON M) 20 MEQ extended release tablet Take 1 tablet by mouth daily for 5 days 5 tablet 0    diclofenac sodium (VOLTAREN) 1 % GEL Apply 4 g topically 4 times daily 4 Tube 3    Elastic Bandages & Supports (KNEE BRACE) MISC 2 Units by Does not apply route daily 2 each 0    fluticasone (FLOVENT HFA) 110 MCG/ACT inhaler Inhale 1 puff into the lungs 2 times daily 1 Inhaler 11     No facility-administered encounter medications on file as of 10/7/2022. Social History:   TOBACCO:   reports that she has never smoked. She has never used smokeless tobacco.  ETOH:   reports no history of alcohol use.   OCCUPATION:  Use to work in [employer] to [employer]    Family History:       Problem Relation Age of Onset    Diabetes Mother     Heart Failure Mother     Diabetes Maternal Grandmother     Heart Failure Maternal Grandmother     No Known Problems Maternal Grandfather     No Known Problems Paternal Grandmother     No Known Problems Paternal Grandfather     Cancer Paternal Aunt        Immunizations:    Immunization History   Administered Date(s) Administered    COVID-19, J&J, (age 18y+), IM, 0.5 mL 03/31/2021    Influenza Vaccine, unspecified formulation 10/26/2017, 09/27/2018         REVIEW OF SYSTEMS:    General ROS: Negative for  - fatigue and negative for weight gain, fever chills  ENT ROS: negative for - nasal congestion and nasal discharge, negative for - sneezing, sore throat, tinnitus, vertigo, visual changes or vocal changes  Allergy and Immunology ROS: negative for - nasal congestion, postnasal drip and seasonal allergies  Respiratory ROS: Positive for dyspnea on activities and exertion, positive for -mild intermittent cough, negative for wheezing hemoptysis chest pain, negative for sputum changes. Cardiovascular ROS: positive for - dyspnea on exertion and chronic edema, chronic orthopnea, negative for - chest pain, irregular heartbeat, loss of consciousness, murmur,  palpitations, paroxysmal nocturnal dyspnea  Gastrointestinal ROS: no abdominal pain, change in bowel habits, or black or bloody stools  Musculoskeletal ROS: positive for -lower extremity edema, joint pain. Physical Exam:    Vitals: LMP  (LMP Unknown) Comment: last period was before March 2018  Last 3 weights: Wt Readings from Last 3 Encounters:   09/17/21 (!) 365 lb (165.6 kg)   11/16/20 (!) 370 lb (167.8 kg)   08/31/20 (!) 365 lb (165.6 kg)     There is no height or weight on file to calculate BMI. Physical Examination:   Physical examination not done as this is a telephone visit. No conversational dyspnea noted on the phone.         LABS:    CBC:   WBC   Date Value Ref Range Status   09/17/2021 7.7 3.5 - 11.3 k/uL Final   12/10/2020 6.5 3.5 - 11.3 k/uL Final   12/04/2019 7.9 3.5 - 11.3 k/uL Final     Hemoglobin   Date Value Ref Range Status   09/17/2021 12.5 11.9 - 15.1 g/dL Final   12/10/2020 12.3 11.9 - 15.1 g/dL Final   12/04/2019 12.2 11.9 - 15.1 g/dL Final     Platelets   Date Value Ref Range Status   09/17/2021 282 138 - 453 k/uL Final   12/10/2020 260 138 - 453 k/uL Final   12/04/2019 248 138 - 453 k/uL Final     BMP:   Sodium   Date Value Ref Range Status   09/17/2021 137 135 - 144 mmol/L Final   12/10/2020 141 135 - 144 mmol/L Final   07/09/2020 140 135 - 144 mmol/L Final     Potassium   Date Value Ref Range Status   09/17/2021 3.0 (L) 3.7 - 5.3 mmol/L Final   12/10/2020 3.8 3.7 - 5.3 mmol/L Final   07/09/2020 3.6 (L) 3.7 - 5.3 mmol/L Final     Chloride   Date Value Ref Range Status   09/17/2021 97 (L) 98 - 107 mmol/L Final   12/10/2020 103 98 - 107 mmol/L Final   07/09/2020 102 98 - 107 mmol/L Final     CO2   Date Value Ref Range Status   09/17/2021 30 20 - 31 mmol/L Final   12/10/2020 26 20 - 31 mmol/L Final   07/09/2020 26 20 - 31 mmol/L Final     BUN   Date Value Ref Range Status   09/17/2021 11 6 - 20 mg/dL Final   09/17/2021 12 6 - 20 mg/dL Final   12/10/2020 13 6 - 20 mg/dL Final     Creatinine   Date Value Ref Range Status   09/17/2021 0.87 0.50 - 0.90 mg/dL Final   09/17/2021 0.80 0.50 - 0.90 mg/dL Final   12/10/2020 0.81 0.50 - 0.90 mg/dL Final     Glucose   Date Value Ref Range Status   09/17/2021 82 70 - 99 mg/dL Final   12/10/2020 96 70 - 99 mg/dL Final   07/09/2020 94 70 - 99 mg/dL Final     Hepatic:     Amylase: No results found for: AMYLASE  Lipase: No results found for: LIPASE  CARDIAC ENZYMES: No results found for: CKTOTAL, CKMB, CKMBINDEX, TROPONINI  BNP: No results found for: BNP  Lipids:   Cholesterol, Total   Date Value Ref Range Status   01/15/2018 174 mg/dL Final     Cholesterol   Date Value Ref Range Status   12/10/2020 176 <200 mg/dL Final     Comment:        Cholesterol Guidelines:      <200  Desirable   200-240  Borderline      >240  Undesirable          HDL   Date Value Ref Range Status   12/10/2020 53 >40 mg/dL Final     Comment:        HDL Guidelines:    <40     Undesirable   40-59    Borderline    >59     Desirable          ABGs: No results found for: PHART, PO2ART, GTQ6YDW  INR: No results found for: INR  Thyroid:   Lab Results   Component Value Date/Time    TSH 0.94 12/10/2020 12:52 PM     Urinalysis:     Cultures:-  -----------------------------------------------------------------  CXR    Chest x-ray 12/23/2021 report from [address] [place] [address] no images available  *  Lungs are clear   *  No pleural effusion, pneumothorax, nor volume loss   *  Heart and mediastinal structures are unremarkable   *  Pulmonary vasculature stable   *  In summary, stable single view chest.     Immunization History   Administered Date(s) Administered    COVID-19, J&J, (age 18y+), IM, 0.5 mL 03/31/2021    Influenza Vaccine, unspecified formulation 10/26/2017, 09/27/2018          Pulmonary Function Test:    10/30/2019: FEV1 2.00 83%, FVC 2.64 88%, FEV1 FVC 93%    09/13/2017:  FEV1 2.03 82%, FVC 2.48 81%, FEV1/%, RV 1.48 90%, TLC 3.79 80.8 %, DLCO 16.70 76%      Last compliance from CPAP 05/07/18 to 06/05/18  Usage 3 out of 30 days compliance 10%. Average usage 22 minutes for total days used. AHI 1.6 on CPAP of 9 cm    Assessment and Plan       ICD-10-CM    1. Mild persistent asthma without complication  D65.66       2. AISHWARYA (obstructive sleep apnea)  G47.33       3. Allergic rhinitis, unspecified seasonality, unspecified trigger  J30.9           Plan and recommendations:    I discussed with the patient today about the compliance with medication she is very compliant with Breo per chart she does not usually require albuterol and her asthma is pretty well controlled. Continue with Breo 100/25 1 puff once daily. Albuterol to be used as needed. Avoid allergens and triggers. Flonase nasal spray if needed    I have discussed with her again today about the CPAP as she has history of sleep apnea but she was not able to tolerate not interested in using CPAP different mask and pillows were tried in the past.  Maintain an active lifestyle   Wt loss is recommended and discussed  Follow good sleep hygeine instructions  Given sleep hygeine instructions    Vaccinations recommended and she she get her vaccination from PCP/pharmacy. Up-to-date on pneumonia vaccine  Annual flu vaccine in fall recommended  According to patient she had [employer] vaccine last year. Booster recommended  Questions answered to pt's satisfaction     RTC 6 months.   Advised patient to follow-up in the office per patient she is not able to ambulate out of the house and if she is not able to come to office she will schedule telephone visit    It was my pleasure to evaluate Xiomara Zafar today. Please call with questions. Daysi Curran MD, MD             10/7/2022, 3:33 PM    Please note that this chart was generated using voice recognition Dragon dictation software. Although every effort was made to ensure the accuracy of this automated transcription, some errors in transcription may have occurred. Xiomara Zafar is a 47 y.o. female being evaluated by a Virtual Visit (video/telephone visit) encounter to address concerns as mentioned above. A caregiver was present when appropriate. Due to this being a TeleHealth encounter (During [facility] emergency), evaluation of the following organ systems was limited: Vitals/Constitutional/EENT/Resp/CV/GI//MS/Neuro/Skin/Heme-Lymph-Imm. Pursuant to the emergency declaration under the [address] [place] [address], [address] [place] [address] and the [employer] Act, this Virtual Visit was conducted with patient's (and/or legal guardian's) consent, to reduce the patient's risk of exposure to COVID-19 and provide necessary medical care. The patient (and/or legal guardian) has also been advised to contact this office for worsening conditions or problems, and seek emergency medical treatment and/or call 911 if deemed necessary. Patient identification was verified at the start of the visit: Yes  Total time spent for this encounter: 21 minutes  Services were provided through a video/telephone synchronous discussion virtually to substitute for in-person clinic visit. Patient and provider were located at their individual locations at home and office respectively. --Daysi Curran MD on 10/7/2022 at 3:33 PM    An electronic signature was used to authenticate this note.

## 2023-02-13 ENCOUNTER — HOSPITAL ENCOUNTER (OUTPATIENT)
Dept: MAMMOGRAPHY | Age: 53
Discharge: HOME | End: 2023-02-15
Payer: COMMERCIAL

## 2023-02-13 DIAGNOSIS — Z12.31 VISIT FOR SCREENING MAMMOGRAM: ICD-10-CM

## 2023-02-13 PROCEDURE — 77063 BREAST TOMOSYNTHESIS BI: CPT

## 2023-05-04 RX ORDER — FLUTICASONE FUROATE AND VILANTEROL TRIFENATATE 100; 25 UG/1; UG/1
POWDER RESPIRATORY (INHALATION)
Qty: 60 EACH | OUTPATIENT
Start: 2023-05-04

## 2023-05-04 NOTE — TELEPHONE ENCOUNTER
Received a refill request for Breo. This has been denied because it is a refill too soon. A 1 yr script was sent in on 4/11/2023.

## 2023-10-09 ENCOUNTER — TELEPHONE (OUTPATIENT)
Dept: PULMONOLOGY | Age: 53
End: 2023-10-09

## 2023-10-09 NOTE — TELEPHONE ENCOUNTER
Pt is unable to walk or leave the house due to leg swelling. She was scheduled for a telephone visit.

## 2023-10-09 NOTE — TELEPHONE ENCOUNTER
Patient was last seen in the office October 2019. Last 5 visits have been virtual visits/telephone calls. She cancelled her October 10, 2023 appt and wishes to reschedule but wants next visit to be a virtual visit/telephone call again. Your next openings are in February 2024. Are you okay to do another VV/telephone call next visit?

## 2024-02-27 ENCOUNTER — SCHEDULED TELEPHONE ENCOUNTER (OUTPATIENT)
Dept: PULMONOLOGY | Age: 54
End: 2024-02-27
Payer: MEDICARE

## 2024-02-27 DIAGNOSIS — G47.33 OSA (OBSTRUCTIVE SLEEP APNEA): ICD-10-CM

## 2024-02-27 DIAGNOSIS — J45.30 MILD PERSISTENT ASTHMA WITHOUT COMPLICATION: Primary | ICD-10-CM

## 2024-02-27 DIAGNOSIS — J30.9 ALLERGIC RHINITIS, UNSPECIFIED SEASONALITY, UNSPECIFIED TRIGGER: ICD-10-CM

## 2024-02-27 PROCEDURE — 99213 OFFICE O/P EST LOW 20 MIN: CPT | Performed by: INTERNAL MEDICINE

## 2024-02-27 RX ORDER — FLUTICASONE FUROATE AND VILANTEROL 100; 25 UG/1; UG/1
1 POWDER RESPIRATORY (INHALATION) DAILY
Qty: 30 EACH | Refills: 11 | Status: SHIPPED | OUTPATIENT
Start: 2024-02-27

## 2024-02-27 ASSESSMENT — SLEEP AND FATIGUE QUESTIONNAIRES
ESS TOTAL SCORE: 5
HOW LIKELY ARE YOU TO NOD OFF OR FALL ASLEEP WHILE SITTING AND READING: 2
HOW LIKELY ARE YOU TO NOD OFF OR FALL ASLEEP WHILE WATCHING TV: 1
HOW LIKELY ARE YOU TO NOD OFF OR FALL ASLEEP WHILE SITTING AND TALKING TO SOMEONE: 0
HOW LIKELY ARE YOU TO NOD OFF OR FALL ASLEEP IN A CAR, WHILE STOPPED FOR A FEW MINUTES IN TRAFFIC: 0
HOW LIKELY ARE YOU TO NOD OFF OR FALL ASLEEP WHILE SITTING QUIETLY AFTER LUNCH WITHOUT ALCOHOL: 0
HOW LIKELY ARE YOU TO NOD OFF OR FALL ASLEEP WHILE SITTING INACTIVE IN A PUBLIC PLACE: 0
HOW LIKELY ARE YOU TO NOD OFF OR FALL ASLEEP WHILE LYING DOWN TO REST IN THE AFTERNOON WHEN CIRCUMSTANCES PERMIT: 2
HOW LIKELY ARE YOU TO NOD OFF OR FALL ASLEEP WHEN YOU ARE A PASSENGER IN A CAR FOR AN HOUR WITHOUT A BREAK: 0

## 2024-02-27 NOTE — PROGRESS NOTES
face.  She usually go to sleep around 11 PM and wake up around 7 in the morning some days she wake up late as late as 11.  According patient she usually does not take nap every day but some days she take nap.    She had a chest x-ray done on 01/04/2024 [facility] and according to chest x-ray report no acute changes.  Images not available for my review.  According to patient she did not know why chest x-ray was done.    Last pulmonary function test on 10/30/2019 showed normal spirometry and no obstruction and unable to do DLCO and lung volumes.      Previous history and clinical course  She was seen initially in 2017 and she was supposed to be on auto CPAP 10-20.  She was started on auto CPAP but she was not compliant with CPAP.  She had a re-titration study done and her CPAP was changed to 9 cm, she remained noncompliant with CPAP.   She has a history of asthma which was mild persistent/intermittent asthma she claimed that her asthma was under control on medication when she was taking inhaled corticosteroids.  Her allergic rhinitis symptoms are also under control and she use Flonase as needed          2/27/2024     9:27 AM 4/11/2023    11:36 AM 10/7/2022     3:02 PM 10/30/2020     4:30 PM 10/30/2019     2:20 PM 3/13/2019     2:30 PM 8/28/2018    10:32 AM   Sleep Medicine   Sitting and reading 2 1 1 1 2 3 1   Watching TV 1 1 1 1 3 3 1   Sitting, inactive in a public place (e.g. a theatre or a meeting) 0 0 0 0 1 1 0   As a passenger in a car for an hour without a break 0 0 2 0 0 0 0   Lying down to rest in the afternoon when circumstances permit 2 1 0 0 3 2 1   Sitting and talking to someone 0 0 0 0 3 0 0   Sitting quietly after a lunch without alcohol 0 0 0 0 2 1 0   In a car, while stopped for a few minutes in traffic 0 0 0 0 0 0 0   [place] Sleepiness Score 5 3 4 2 14 10 3           Past Medical History:        Diagnosis Date    Asthma     Mild intermittent asthma without complication    Astigmatism,

## 2024-03-21 RX ORDER — FLUTICASONE FUROATE AND VILANTEROL 100; 25 UG/1; UG/1
1 POWDER RESPIRATORY (INHALATION) DAILY
Qty: 30 EACH | Refills: 11 | Status: SHIPPED | OUTPATIENT
Start: 2024-03-21

## 2024-05-03 NOTE — FLOWSHEET NOTE
Patient's headaches are much improved since last visit.  He has had rare occurrence of headaches.  In the past he had intermittent short episodes of pain in the left temporal region.  He did have a workup in the past of MRI/MRI brain and temporal artery biopsy which were all negative.  Since last visit he has retired in which his stress levels are reduced and therefore felt stress was a large trigger to his headaches.  Given his headaches are much improved we did discuss considering reduction of gabapentin.  He will take gabapentin 100 mg twice daily.  If no worsening of his symptoms after several weeks he can reduce to 100 mg at bedtime.  If no worsening symptoms after several weeks he can stop medication altogether.  If he notes any worsening headaches or increase in his neuropathic pain he should return to prior dosing.     Both at once for inc neuro-feedback; weight added 8/9/19   Hip abd-supine 15 AA Cues for foot in neutral-use slideboard next visit, added 8/9/19   HS SETS 15x5\"       HEEL SLIDES  15x AA   supine    SAQ'S 15x Foam roll under B knees 1# wt added 8/9/19   SLR 15x A      GLUTE SETS 15x5\"  With foam roll under knees 8/9                 SIDE HIP ABD 15x AA Added 7/18   Reverse clamshells 15x A Added 8/9/19   clamshells 15x A Added 7/18   SIDE HIP ADD 15x ball squeeze Unable to complete SL 8/2                 PRONE            HIP EXT             HS CURLS                  SEATED      HEEL SLIDES    pillowcase    CALF STRETCH 2x20\"     belt   LAQ'S 15x   1#     MARCHES 15xea   1# Added weight 8/6   ANKLE PUMPS 15x     home this date   Hip add with ball 15x5\"  Seated 8/9/19   Hip abd - 15x plum Seated 8/9/19                       STANDING   Flexed position and max UE support with all standing, 8/9/19   HEEL RAISES 15x      barely clearing heels 8/9/19   SQUATS - mini 5x    MINI, max UE support - not 8/9/19 \" oh I cant do those\".               STEP UPS            STEPDOWNS                          TKE 15x [place]  Added 8/2   3 way hip 15xea 1# Added weight 8/6   HS CURLS  15xea  1# Added weight 8/6   Ambulation  1 lap  1 rest break; SOB after 8/9/19   Other: pt needs reminders to lock rolling walker prior to sit to stand transfers when using as assistance. Answers many questions with \"I'm not sure\"   Rest breaks required throughout session      Specific Instructions for next treatment: BLE Strength & Flexibility exercises, AAROM/PROM/AROM,  progress to standing/SLS activities as able, dynamic/static balance activities, vasocompression, gait training with rollator; and sit<>stand re-training from high mat table next visit.        Treatment Charges: Mins Units   []  Modalities     [x]  Ther Exercise  50 3   []  Manual Therapy     []  Ther Activities     []  Aquatics     []  Vasocompression     []  Other CP     Total added this date and completion of ex without substitution, especially standing ex  [] Demonstrates/verbalizes HEP/Ed previously given. Plan: [x] Continue per plan of care.     [x] Other: continue to work toward [employer] transition        Time In:  7:55        Time Out:  9:10 am  Electronically signed by:  Omid Albrecht PTA

## 2024-07-19 ENCOUNTER — APPOINTMENT (OUTPATIENT)
Dept: GENERAL RADIOLOGY | Facility: CLINIC | Age: 54
End: 2024-07-19
Payer: MEDICARE

## 2024-07-19 ENCOUNTER — HOSPITAL ENCOUNTER (EMERGENCY)
Facility: CLINIC | Age: 54
Discharge: HOME | End: 2024-07-19
Attending: EMERGENCY MEDICINE
Payer: MEDICARE

## 2024-07-19 VITALS
SYSTOLIC BLOOD PRESSURE: 163 MMHG | TEMPERATURE: 98.2 F | WEIGHT: 293 LBS | HEIGHT: 65 IN | BODY MASS INDEX: 48.82 KG/M2 | OXYGEN SATURATION: 97 % | DIASTOLIC BLOOD PRESSURE: 94 MMHG | HEART RATE: 87 BPM | RESPIRATION RATE: 18 BRPM

## 2024-07-19 DIAGNOSIS — U07.1 COVID-19: Primary | ICD-10-CM

## 2024-07-19 LAB
ALBUMIN SERPL-MCNC: 3.9 G/DL (ref 3.5–5.2)
ALBUMIN/GLOB SERPL: 1 {RATIO} (ref 1–2.5)
ALP SERPL-CCNC: 102 U/L (ref 35–104)
ALT SERPL-CCNC: 16 U/L (ref 5–33)
AMORPH SED URNS QL MICRO: ABNORMAL
ANION GAP SERPL CALCULATED.3IONS-SCNC: 12 MMOL/L (ref 9–17)
AST SERPL-CCNC: 16 U/L
BACTERIA URNS QL MICRO: ABNORMAL
BASOPHILS # BLD: 0.05 K/UL (ref 0–0.2)
BASOPHILS NFR BLD: 1 % (ref 0–2)
BILIRUB DIRECT SERPL-MCNC: 0.1 MG/DL
BILIRUB INDIRECT SERPL-MCNC: 0.2 MG/DL (ref 0–1)
BILIRUB SERPL-MCNC: 0.3 MG/DL (ref 0.3–1.2)
BILIRUB UR QL STRIP: NEGATIVE
BUN SERPL-MCNC: 9 MG/DL (ref 6–20)
CALCIUM SERPL-MCNC: 9.7 MG/DL (ref 8.6–10.4)
CHARACTER UR: ABNORMAL
CHLORIDE SERPL-SCNC: 101 MMOL/L (ref 98–107)
CLARITY UR: CLEAR
CO2 SERPL-SCNC: 26 MMOL/L (ref 20–31)
COLOR UR: YELLOW
COMMENT: ABNORMAL
CREAT SERPL-MCNC: 0.8 MG/DL (ref 0.5–0.9)
EKG ATRIAL RATE: 85 BPM
EKG P AXIS: 40 DEGREES
EKG P-R INTERVAL: 172 MS
EKG Q-T INTERVAL: 358 MS
EKG QRS DURATION: 84 MS
EKG QTC CALCULATION (BAZETT): 426 MS
EKG R AXIS: 3 DEGREES
EKG T AXIS: 13 DEGREES
EKG VENTRICULAR RATE: 85 BPM
EOSINOPHIL # BLD: 0.16 K/UL (ref 0–0.4)
EOSINOPHILS RELATIVE PERCENT: 3 % (ref 1–4)
EPI CELLS #/AREA URNS HPF: ABNORMAL /HPF (ref 0–5)
ERYTHROCYTE [DISTWIDTH] IN BLOOD BY AUTOMATED COUNT: 14.9 % (ref 12.5–15.4)
GFR, ESTIMATED: 87 ML/MIN/1.73M2
GLUCOSE SERPL-MCNC: 87 MG/DL (ref 70–99)
GLUCOSE UR STRIP-MCNC: NEGATIVE MG/DL
HCT VFR BLD AUTO: 43.8 % (ref 36–46)
HGB BLD-MCNC: 14.4 G/DL (ref 12–16)
HGB UR QL STRIP.AUTO: NEGATIVE
KETONES UR STRIP-MCNC: NEGATIVE MG/DL
LEUKOCYTE ESTERASE UR QL STRIP: ABNORMAL
LIPASE SERPL-CCNC: 20 U/L (ref 13–60)
LYMPHOCYTES NFR BLD: 1.33 K/UL (ref 1–4.8)
LYMPHOCYTES RELATIVE PERCENT: 25 % (ref 24–44)
MCH RBC QN AUTO: 29.3 PG (ref 26–34)
MCHC RBC AUTO-ENTMCNC: 32.8 G/DL (ref 31–37)
MCV RBC AUTO: 89.5 FL (ref 80–100)
MONOCYTES NFR BLD: 0.53 K/UL (ref 0.1–1.2)
MONOCYTES NFR BLD: 10 % (ref 2–11)
MORPHOLOGY: NORMAL
NEUTROPHILS NFR BLD: 61 % (ref 36–66)
NEUTS SEG NFR BLD: 3.23 K/UL (ref 1.8–7.7)
NITRITE UR QL STRIP: NEGATIVE
PH UR STRIP: 8 [PH] (ref 5–8)
PLATELET # BLD AUTO: NORMAL K/UL (ref 140–450)
PMV BLD AUTO: 8.5 FL (ref 6–12)
POTASSIUM SERPL-SCNC: 3.7 MMOL/L (ref 3.7–5.3)
PROT SERPL-MCNC: 7.7 G/DL (ref 6.4–8.3)
PROT UR STRIP-MCNC: NEGATIVE MG/DL
RBC # BLD AUTO: 4.9 M/UL (ref 4–5.2)
RBC #/AREA URNS HPF: ABNORMAL /HPF (ref 0–2)
SARS-COV-2 RDRP RESP QL NAA+PROBE: DETECTED
SODIUM SERPL-SCNC: 139 MMOL/L (ref 135–144)
SP GR UR STRIP: 1.02 (ref 1–1.03)
SPECIMEN DESCRIPTION: ABNORMAL
TROPONIN I SERPL HS-MCNC: <6 NG/L (ref 0–14)
UROBILINOGEN UR STRIP-ACNC: NORMAL EU/DL (ref 0–1)
WBC #/AREA URNS HPF: ABNORMAL /HPF (ref 0–5)
WBC OTHER # BLD: 5.3 K/UL (ref 3.5–11)

## 2024-07-19 PROCEDURE — 81001 URINALYSIS AUTO W/SCOPE: CPT

## 2024-07-19 PROCEDURE — 99285 EMERGENCY DEPT VISIT HI MDM: CPT

## 2024-07-19 PROCEDURE — 6370000000 HC RX 637 (ALT 250 FOR IP): Performed by: REGISTERED NURSE

## 2024-07-19 PROCEDURE — 87635 SARS-COV-2 COVID-19 AMP PRB: CPT

## 2024-07-19 PROCEDURE — 36415 COLL VENOUS BLD VENIPUNCTURE: CPT

## 2024-07-19 PROCEDURE — 84484 ASSAY OF TROPONIN QUANT: CPT

## 2024-07-19 PROCEDURE — 71045 X-RAY EXAM CHEST 1 VIEW: CPT

## 2024-07-19 PROCEDURE — 80048 BASIC METABOLIC PNL TOTAL CA: CPT

## 2024-07-19 PROCEDURE — 85025 COMPLETE CBC W/AUTO DIFF WBC: CPT

## 2024-07-19 PROCEDURE — 93005 ELECTROCARDIOGRAM TRACING: CPT | Performed by: REGISTERED NURSE

## 2024-07-19 PROCEDURE — 83690 ASSAY OF LIPASE: CPT

## 2024-07-19 PROCEDURE — 80076 HEPATIC FUNCTION PANEL: CPT

## 2024-07-19 RX ORDER — BENZONATATE 100 MG/1
100 CAPSULE ORAL 3 TIMES DAILY PRN
Qty: 30 CAPSULE | Refills: 0 | Status: SHIPPED | OUTPATIENT
Start: 2024-07-19 | End: 2024-07-29

## 2024-07-19 RX ORDER — BENZONATATE 100 MG/1
100 CAPSULE ORAL ONCE
Status: COMPLETED | OUTPATIENT
Start: 2024-07-19 | End: 2024-07-19

## 2024-07-19 RX ORDER — ACETAMINOPHEN 500 MG
1000 TABLET ORAL ONCE
Status: COMPLETED | OUTPATIENT
Start: 2024-07-19 | End: 2024-07-19

## 2024-07-19 RX ADMIN — ACETAMINOPHEN 1000 MG: 500 TABLET ORAL at 11:14

## 2024-07-19 RX ADMIN — BENZONATATE 100 MG: 100 CAPSULE ORAL at 11:14

## 2024-07-19 ASSESSMENT — ENCOUNTER SYMPTOMS
COUGH: 1
DIARRHEA: 0
SORE THROAT: 0
VOMITING: 0
NAUSEA: 0
ABDOMINAL PAIN: 1
SHORTNESS OF BREATH: 0
BACK PAIN: 0

## 2024-07-19 ASSESSMENT — PAIN SCALES - GENERAL: PAINLEVEL_OUTOF10: 8

## 2024-07-19 ASSESSMENT — PAIN - FUNCTIONAL ASSESSMENT: PAIN_FUNCTIONAL_ASSESSMENT: 0-10

## 2024-07-19 ASSESSMENT — PAIN DESCRIPTION - ORIENTATION: ORIENTATION: MID;UPPER

## 2024-07-19 ASSESSMENT — PAIN DESCRIPTION - LOCATION: LOCATION: ABDOMEN

## 2024-07-19 NOTE — DISCHARGE INSTRUCTIONS
Please understand that at this time there is no evidence for a more serious underlying process, but that early in the process of an illness or injury, an emergency department workup can be falsely reassuring.  You should contact your family doctor within the next 48 hours for a follow up appointment    THANK YOU!!!    From [facility] and [facility]    On behalf of the Emergency Department staff at [facility], I would like to thank you for giving us the opportunity to address your health care needs and concerns.    We hope that during your visit, our service was delivered in a professional and caring manner. Please keep [facility] in mind as we walk with you down the path to your own personal wellness.     Please expect an automated text message or email from us so we can ask a few questions about your health and progress. Based on your answers, a clinician may call you back to offer help and instructions.    Please understand that early in the process of an illness or injury, an emergency department workup can be falsely reassuring.  If you notice any worsening, changing or persistent symptoms please call your family doctor or return to the ER immediately.     Tell us how we did during your visit at [email or web address]   and let us know about your experience

## 2024-07-19 NOTE — ED PROVIDER NOTES
[facility] ED      Pt Name: Alma Delia Goldsmith  MRN: [id number]  Birthdate 1970  Date of evaluation: 7/19/2024    EMERGENCY DEPARTMENT ENCOUNTER      PERTINENT ATTENDING PHYSICIAN COMMENTS:      Faculty Attestation    I performed a history and physical examination of the patient and discussed management with the mid level provideer. I reviewed the mid level provider's note and agree with the documented findings and plan of care.Any areas of disagreement are noted on the chart. I was personally present for the key portions of any procedures. I have documented in the chart those procedures where I was not present during the key portions. I have reviewed the emergency nurses triage note. I agree with the chief complaint, past medical history, past surgical history, allergies, medications, social and family history as documented unless otherwise noted below. Documentation of the HPI, Physical Exam and Medical Decision Making performed by medical students or scribes is based on my personal performance of the HPI, PE and MDM. For Residents/Physician Assistant/ Nurse Practitioner cases/documentation I have personally evaluated this patient and have completed at least one if not all key elements of the E/M (history, physical exam, and MDM). Additional findings are as noted.    CHIEF COMPLAINT       Chief Complaint   Patient presents with    Cough    Shortness of Breath    Abdominal Pain     Pt c/o cough since Monday and prescribed allergy medicine from PCP. Pt states throat feels \"sick\". Pt c/o upper mid abd pain. Last BM Sunday.       HISTORY OF PRESENT ILLNESS    Alma Delia Goldsmith is a 54 y.o. female who presents To the emergency department complaining of a four day history of cough with shortness of breath abdominal pain and chest pain. Patient states that her symptoms started on Monday with sore throat and congestion. Later in the week she developed worsening fatigue. She denies any chest pain currently but  107 mmol/L    CO2 26 20 - 31 mmol/L    Anion Gap 12 9 - 17 mmol/L    Glucose 87 70 - 99 mg/dL    BUN 9 6 - 20 mg/dL    Creatinine 0.8 0.5 - 0.9 mg/dL    Est, Glom Filt Rate 87 >60 mL/min/1.73m2    Calcium 9.7 8.6 - 10.4 mg/dL   CBC with Auto Differential   Result Value Ref Range    WBC 5.3 3.5 - 11.0 k/uL    RBC 4.90 4.0 - 5.2 m/uL    Hemoglobin 14.4 12.0 - 16.0 g/dL    Hematocrit 43.8 36 - 46 %    MCV 89.5 80 - 100 fL    MCH 29.3 26 - 34 pg    MCHC 32.8 31 - 37 g/dL    RDW 14.9 12.5 - 15.4 %    Platelets Platelet clumps present, count appears adequate. 140 - 450 k/uL    MPV 8.5 6.0 - 12.0 fL    Neutrophils % 61 36 - 66 %    Lymphocytes % 25 24 - 44 %    Monocytes % 10 2 - 11 %    Eosinophils % 3 1 - 4 %    Basophils % 1 0 - 2 %    Neutrophils Absolute 3.23 1.8 - 7.7 k/uL    Lymphocytes Absolute 1.33 1.0 - 4.8 k/uL    Monocytes Absolute 0.53 0.1 - 1.2 k/uL    Eosinophils Absolute 0.16 0.0 - 0.4 k/uL    Basophils Absolute 0.05 0.0 - 0.2 k/uL    Morphology ANISOCYTOSIS PRESENT    Lipase   Result Value Ref Range    Lipase 20 13 - 60 U/L   Hepatic Function Panel   Result Value Ref Range    Albumin 3.9 3.5 - 5.2 g/dL    Alkaline Phosphatase 102 35 - 104 U/L    ALT 16 5 - 33 U/L    AST 16 <32 U/L    Total Bilirubin 0.3 0.3 - 1.2 mg/dL    Bilirubin, Direct 0.1 <0.3 mg/dL    Bilirubin, Indirect 0.2 0.0 - 1.0 mg/dL    Total Protein 7.7 6.4 - 8.3 g/dL    Albumin/Globulin Ratio 1.0 1.0 - 2.5   Troponin   Result Value Ref Range    Troponin, High Sensitivity <6 0 - 14 ng/L   Microscopic Urinalysis   Result Value Ref Range    WBC, UA None 0 - 5 /HPF    RBC, UA None 0 - 2 /HPF    Epithelial Cells, UA 0 TO 2 0 - 5 /HPF    Bacteria, UA FEW (A) None    Amorphous, UA 2+     Other Observations UA (A) NOT REQ.     Utilizing a urinalysis as the only screening method to exclude a potential uropathogen can be unreliable in many patient populations.  Rapid screening tests are less sensitive than culture and if UTI is a clinical possibility,

## 2024-07-19 NOTE — ED PROVIDER NOTES
[facility]  [address] [facility] [address]  Phone: [phone]        Pt Name: Alma Delia Goldsmith  MRN: [id number]  Birthdate 1970  Date of evaluation: 7/19/24    CHIEFCOMPLAINT       Chief Complaint   Patient presents with    Cough    Shortness of Breath    Abdominal Pain     Pt c/o cough since Monday and prescribed allergy medicine from PCP. Pt states throat feels \"sick\". Pt c/o upper mid abd pain. Last BM Sunday.       HISTORY OF PRESENT ILLNESS (Location/Symptom, Timing/Onset, Context/Setting, Quality, Duration, Modifying Factors, Severity)      Alma Delia Goldsmith is a 54 y.o. female who presents to the ED via private auto with Cough and generalized feeling ill ongoing for the last four days. She denies any fevers, chills. Headache, dizziness, sob, chest pain, abd pain, or n/v/d on arrival. She has taken allergy medications with minimal relief of symtpoms. Nikki rrival she is resting on cot comofrtably with even non labored breaths, is non toxic appearing with no acute distress noted.      PAST MEDICAL / SURGICAL / SOCIAL / FAMILY HISTORY     PMH:  has a past medical history of Asthma, Astigmatism, regular, Bronchitis, Chronic back pain, Depression, Diabetes mellitus (HCC), Frequency of urination, Hyperlipidemia, Hypertension, Increased frequency of urination, Morbid obesity with body mass index (BMI) of 50.0 to 59.9 in adult (HCC), Myopia, OAG (open angle glaucoma) suspect, low risk, Palpitations, Schizophrenia (HCC), Sleep apnea, Urge incontinence of urine, and Wears glasses.  Surgical History:  has a past surgical history that includes other surgical history (Right, 10/17/14); hysteroscopy (11/14/2017); Dilation and curettage of uterus (N/A, 11/14/2017); hc inject other perphrl nerv (Bilateral, 3/25/2019); hc inject other perphrl nerv (Bilateral, 5/13/2019); hc inject other perphrl nerv (Bilateral, 5/20/2019); other surgical history (Bilateral, 06/10/2019); hc inject other perphrl nerv

## 2024-09-04 ENCOUNTER — HOSPITAL ENCOUNTER (EMERGENCY)
Facility: CLINIC | Age: 54
Discharge: HOME | End: 2024-09-04
Attending: STUDENT IN AN ORGANIZED HEALTH CARE EDUCATION/TRAINING PROGRAM
Payer: MEDICARE

## 2024-09-04 VITALS
RESPIRATION RATE: 17 BRPM | HEIGHT: 65 IN | WEIGHT: 293 LBS | OXYGEN SATURATION: 96 % | DIASTOLIC BLOOD PRESSURE: 96 MMHG | BODY MASS INDEX: 48.82 KG/M2 | SYSTOLIC BLOOD PRESSURE: 131 MMHG | HEART RATE: 92 BPM | TEMPERATURE: 98.7 F

## 2024-09-04 DIAGNOSIS — B34.9 VIRAL ILLNESS: Primary | ICD-10-CM

## 2024-09-04 LAB
BILIRUB UR QL STRIP: NEGATIVE
CLARITY UR: CLEAR
COLOR UR: YELLOW
COMMENT: NORMAL
GLUCOSE UR STRIP-MCNC: NEGATIVE MG/DL
HGB UR QL STRIP.AUTO: NEGATIVE
KETONES UR STRIP-MCNC: NEGATIVE MG/DL
LEUKOCYTE ESTERASE UR QL STRIP: NEGATIVE
NITRITE UR QL STRIP: NEGATIVE
PH UR STRIP: 7 [PH] (ref 5–8)
PROT UR STRIP-MCNC: NEGATIVE MG/DL
SARS-COV-2 RDRP RESP QL NAA+PROBE: NOT DETECTED
SP GR UR STRIP: 1.02 (ref 1–1.03)
SPECIMEN DESCRIPTION: NORMAL
UROBILINOGEN UR STRIP-ACNC: NORMAL EU/DL (ref 0–1)

## 2024-09-04 PROCEDURE — 81003 URINALYSIS AUTO W/O SCOPE: CPT

## 2024-09-04 PROCEDURE — 99283 EMERGENCY DEPT VISIT LOW MDM: CPT

## 2024-09-04 PROCEDURE — 87635 SARS-COV-2 COVID-19 AMP PRB: CPT

## 2024-09-04 NOTE — ED PROVIDER NOTES
[facility]      Pt Name: Alma Delia Goldsmith  MRN: [id number]  Birthdate 1970  Date of evaluation: 9/4/2024    EMERGENCY DEPARTMENT ENCOUNTER           I reviewed the mid level provider's note and agree with the documented findings and we have discussed the plan of care. I have reviewed the emergency nurses triage note. I agree with the chief complaint, past medical history, past surgical history, allergies, medications, social and family history as documented unless otherwise noted below.    CHIEF COMPLAINT       Chief Complaint   Patient presents with    Cough     2-3 weeks    Urinary Frequency     2-3 weeks        PAST MEDICAL HISTORY    has a past medical history of Asthma, Astigmatism, regular, Bronchitis, Chronic back pain, Depression, Diabetes mellitus (HCC), Frequency of urination, Hyperlipidemia, Hypertension, Increased frequency of urination, Morbid obesity with body mass index (BMI) of 50.0 to 59.9 in adult (HCC), Myopia, OAG (open angle glaucoma) suspect, low risk, Palpitations, Schizophrenia (HCC), Sleep apnea, Urge incontinence of urine, and Wears glasses.    SURGICAL HISTORY      has a past surgical history that includes other surgical history (Right, 10/17/14); hysteroscopy (11/14/2017); Dilation and curettage of uterus (N/A, 11/14/2017); hc inject other perphrl nerv (Bilateral, 3/25/2019); hc inject other perphrl nerv (Bilateral, 5/13/2019); hc inject other perphrl nerv (Bilateral, 5/20/2019); other surgical history (Bilateral, 06/10/2019); hc inject other perphrl nerv (Bilateral, 6/10/2019); Foot surgery; hc inject other perphrl nerv (Bilateral, 11/18/2019); Medication Injection (N/A, 8/17/2020); Medication Injection (N/A, 8/24/2020); and Medication Injection (N/A, 8/31/2020).    CURRENT MEDICATIONS       Previous Medications    ALBUTEROL SULFATE  (90 BASE) MCG/ACT INHALER    Inhale 2 puffs into the lungs every 6 hours as needed for Wheezing    AMLODIPINE (NORVASC) 10 MG 
KERVIN ROLLINS  09/04/24 [id number]

## 2024-09-26 ENCOUNTER — HOSPITAL ENCOUNTER (OUTPATIENT)
Age: 54
Setting detail: SPECIMEN
Discharge: HOME | End: 2024-09-26

## 2024-09-26 LAB
ANION GAP SERPL CALCULATED.3IONS-SCNC: 7 MMOL/L (ref 9–16)
BNP SERPL-MCNC: <36 PG/ML (ref 0–300)
BUN SERPL-MCNC: 11 MG/DL (ref 6–20)
CALCIUM SERPL-MCNC: 9.2 MG/DL (ref 8.6–10.4)
CHLORIDE SERPL-SCNC: 105 MMOL/L (ref 98–107)
CO2 SERPL-SCNC: 30 MMOL/L (ref 20–31)
CREAT SERPL-MCNC: 1 MG/DL (ref 0.5–0.9)
GFR, ESTIMATED: 69 ML/MIN/1.73M2
GLUCOSE SERPL-MCNC: 90 MG/DL (ref 74–99)
POTASSIUM SERPL-SCNC: 4.4 MMOL/L (ref 3.7–5.3)
SODIUM SERPL-SCNC: 142 MMOL/L (ref 136–145)

## 2025-02-13 ENCOUNTER — HOSPITAL ENCOUNTER (OUTPATIENT)
Age: 55
Setting detail: SPECIMEN
Discharge: HOME | End: 2025-02-13

## 2025-02-13 LAB
25(OH)D3 SERPL-MCNC: 35.4 NG/ML (ref 30–100)
ALBUMIN SERPL-MCNC: 3.5 G/DL (ref 3.5–5.2)
ALBUMIN/GLOB SERPL: 1.1 {RATIO} (ref 1–2.5)
ALP SERPL-CCNC: 84 U/L (ref 35–104)
ALT SERPL-CCNC: 14 U/L (ref 10–35)
ANION GAP SERPL CALCULATED.3IONS-SCNC: 8 MMOL/L (ref 9–16)
AST SERPL-CCNC: 15 U/L (ref 10–35)
BILIRUB SERPL-MCNC: 0.3 MG/DL (ref 0–1.2)
BUN SERPL-MCNC: 11 MG/DL (ref 6–20)
CALCIUM SERPL-MCNC: 9 MG/DL (ref 8.6–10.4)
CHLORIDE SERPL-SCNC: 106 MMOL/L (ref 98–107)
CHOLEST SERPL-MCNC: 175 MG/DL (ref 0–199)
CHOLESTEROL/HDL RATIO: 3.6
CO2 SERPL-SCNC: 26 MMOL/L (ref 20–31)
CREAT SERPL-MCNC: 0.9 MG/DL (ref 0.6–0.9)
ERYTHROCYTE [DISTWIDTH] IN BLOOD BY AUTOMATED COUNT: 14.4 % (ref 11.8–14.4)
EST. AVERAGE GLUCOSE BLD GHB EST-MCNC: 120 MG/DL
FOLATE SERPL-MCNC: 16.7 NG/ML (ref 4.8–24.2)
GFR, ESTIMATED: 75 ML/MIN/1.73M2
GLUCOSE SERPL-MCNC: 95 MG/DL (ref 74–99)
HBA1C MFR BLD: 5.8 % (ref 4–6)
HCT VFR BLD AUTO: 39.4 % (ref 36.3–47.1)
HDLC SERPL-MCNC: 48 MG/DL
HGB BLD-MCNC: 11.9 G/DL (ref 11.9–15.1)
LDLC SERPL CALC-MCNC: 116 MG/DL (ref 0–100)
MAGNESIUM SERPL-MCNC: 1.9 MG/DL (ref 1.6–2.6)
MCH RBC QN AUTO: 28.3 PG (ref 25.2–33.5)
MCHC RBC AUTO-ENTMCNC: 30.2 G/DL (ref 28.4–34.8)
MCV RBC AUTO: 93.6 FL (ref 82.6–102.9)
NRBC BLD-RTO: 0 PER 100 WBC
PLATELET # BLD AUTO: 229 K/UL (ref 138–453)
PMV BLD AUTO: 10.1 FL (ref 8.1–13.5)
POTASSIUM SERPL-SCNC: 3.8 MMOL/L (ref 3.7–5.3)
PROT SERPL-MCNC: 6.6 G/DL (ref 6.6–8.7)
RBC # BLD AUTO: 4.21 M/UL (ref 3.95–5.11)
SODIUM SERPL-SCNC: 140 MMOL/L (ref 136–145)
T3 SERPL-MCNC: 99 NG/DL (ref 80–200)
T4 FREE SERPL-MCNC: 0.7 NG/DL (ref 0.92–1.68)
TRIGL SERPL-MCNC: 54 MG/DL (ref 0–149)
TSH SERPL DL<=0.05 MIU/L-ACNC: 0.95 UIU/ML (ref 0.27–4.2)
VIT B12 SERPL-MCNC: 469 PG/ML (ref 232–1245)
VLDLC SERPL CALC-MCNC: 11 MG/DL (ref 1–30)
WBC OTHER # BLD: 4.8 K/UL (ref 3.5–11.3)

## 2025-02-24 ENCOUNTER — TELEPHONE (OUTPATIENT)
Dept: PULMONOLOGY | Age: 55
End: 2025-02-24

## 2025-02-24 NOTE — TELEPHONE ENCOUNTER
Patient is scheduled for an office visit on 2/25/25 and in the notes, it specifically states that the patient must come into the office. Patient has not been seen in the office since 2019. Patient was requesting her appointment to be switched to a telemedicine.Writer advised the patient that we would have to speak with Dr. Shay.     After further review of the patient chart, it appears the patient has been following with pulmonology at [facility]. Writer attempted to contact the patient back to clarify if she is going to continue to follow with [facility] or if she would like to see Dr. Shay as she can not have two pulmonologist. Patient was unavailable. Writer left voicemail requesting a call back to determine if she would like to still follow with Dr. Shay or if she is going to continue to follow with [facility].

## 2025-02-24 NOTE — TELEPHONE ENCOUNTER
Patient called back and cancelled 2/25/25 appointment with Dr. Shay. Patient was informed by other staff that she has to physically be seen in the office.

## 2025-03-05 RX ORDER — FLUTICASONE FUROATE AND VILANTEROL TRIFENATATE 100; 25 UG/1; UG/1
1 POWDER RESPIRATORY (INHALATION) DAILY
Qty: 60 EACH | Refills: 10 | OUTPATIENT
Start: 2025-03-05

## 2025-09-05 ENCOUNTER — TELEPHONE (OUTPATIENT)
Dept: FAMILY MEDICINE CLINIC | Age: 55
End: 2025-09-05

## (undated) DEVICE — SINGLE DOSE EPI TY

## (undated) DEVICE — SINGLE SHOT EPIDURAL TRAY: Brand: MEDLINE INDUSTRIES, INC.

## (undated) DEVICE — GARMENT COMPR STD FOR 17IN CALF UNIF THER FLOTRN

## (undated) DEVICE — NEEDLE SPINAL 22GA L3.5IN SPINOCAN

## (undated) DEVICE — BANDAGE ADH W1XL3IN UNIV PLAS NAT GEN USE STRP

## (undated) DEVICE — DRESSING TRNSPAR W5XL4.5IN FLM SHT SEMIPERMEABLE WIND

## (undated) DEVICE — GLOVE ORANGE PI 7 1/2   MSG9075

## (undated) DEVICE — Z DISCONTINUED APPLICATOR SURG PREP 0.35OZ 2% CHG 70% ISO ALC W/ HI LT

## (undated) DEVICE — GLOVE SURG SZ 75 L12IN FNGR THK87MIL WHT LTX FREE

## (undated) DEVICE — SET ENDOSCP SEAL HYSTEROSCOPE RIG OUTFLO CHN DISP MYOSURE

## (undated) DEVICE — GLOVE SURG SZ 75 CRM LTX FREE POLYISOPRENE POLYMER BEAD ANTI

## (undated) DEVICE — KENDALL SCD EXPRESS SLEEVES, KNEE LENGTH, MEDIUM: Brand: KENDALL SCD

## (undated) DEVICE — TOWEL,OR,DSP,ST,BLUE,STD,4/PK,20PK/CS: Brand: MEDLINE

## (undated) DEVICE — MEDI-VAC NON-CONDUCTIVE SUCTION TUBING 7MM X 6.1M (20 FT.) L: Brand: CARDINAL HEALTH

## (undated) DEVICE — SOLUTION SURG PREP POV IOD 7.5% 4 OZ

## (undated) DEVICE — GEL US 20GM NONIRRITATING OVERWRAPPED FILE PCH TRNSMIT

## (undated) DEVICE — TOWEL,OR,DSP,ST,NATURAL,DLX,4/PK,20PK/CS: Brand: MEDLINE

## (undated) DEVICE — NEEDLE SPNL 22GA L3.5IN BLK HUB S STL REG WALL FIT STYL W/

## (undated) DEVICE — ZINACTIVE USE 2539609 APPLICATOR MEDICATED 10.5 CC SOLUTION HI LT ORNG CHLORAPREP

## (undated) DEVICE — SVMMC GYN MIN PK

## (undated) DEVICE — CONTROL SYRINGE LUER-LOCK TIP: Brand: MONOJECT

## (undated) DEVICE — DEVICE TISS REM DIA3MM L25.25IN ENDOSCP F/ IU POLYPS

## (undated) DEVICE — PENCIL ES L3M BTTN SWCH HOLSTER W/ BLDE ELECTRD EDGE

## (undated) DEVICE — GLOVE SURG SZ 65 THK91MIL LTX FREE SYN POLYISOPRENE

## (undated) DEVICE — 1016 S-DRAPE IRRIG POUCH 10/BOX: Brand: STERI-DRAPE™

## (undated) DEVICE — PREP SOL PVP IODINE 4%  4 OZ/BTL

## (undated) DEVICE — GLOVE ORANGE PI 8   MSG9080

## (undated) DEVICE — SET FLD MGMT CTRL SYS INFLO TB AQUILEX

## (undated) DEVICE — SHEET DRAPE FULL 70X100

## (undated) DEVICE — 1010 S-DRAPE TOWEL DRAPE 10/BX: Brand: STERI-DRAPE™

## (undated) DEVICE — SET FLD MGMT OUTFLO TB DISP FOR CTRL SYS AQUILEX